# Patient Record
Sex: FEMALE | Race: BLACK OR AFRICAN AMERICAN | Employment: UNEMPLOYED | URBAN - NONMETROPOLITAN AREA
[De-identification: names, ages, dates, MRNs, and addresses within clinical notes are randomized per-mention and may not be internally consistent; named-entity substitution may affect disease eponyms.]

---

## 2017-12-18 ENCOUNTER — HOSPITAL ENCOUNTER (OUTPATIENT)
Age: 42
Discharge: HOME | End: 2017-12-18
Payer: COMMERCIAL

## 2017-12-18 ENCOUNTER — HOSPITAL ENCOUNTER (OUTPATIENT)
Dept: GENERAL RADIOLOGY | Age: 42
Discharge: HOME | End: 2017-12-18
Payer: COMMERCIAL

## 2017-12-18 DIAGNOSIS — Z01.818 PRE-OP TESTING: ICD-10-CM

## 2017-12-18 LAB
ALBUMIN SERPL-MCNC: 4.4 G/DL (ref 3.5–5.1)
ALP BLD-CCNC: 76 U/L (ref 38–126)
ALT SERPL-CCNC: 21 U/L (ref 11–66)
ANION GAP SERPL CALCULATED.3IONS-SCNC: 13 MEQ/L (ref 8–16)
AST SERPL-CCNC: 19 U/L (ref 5–40)
AVERAGE GLUCOSE: 150 MG/DL (ref 70–126)
BILIRUB SERPL-MCNC: 0.5 MG/DL (ref 0.3–1.2)
BUN BLDV-MCNC: 9 MG/DL (ref 7–22)
CALCIUM SERPL-MCNC: 9.3 MG/DL (ref 8.5–10.5)
CHLORIDE BLD-SCNC: 102 MEQ/L (ref 98–111)
CO2: 26 MEQ/L (ref 23–33)
CREAT SERPL-MCNC: 0.7 MG/DL (ref 0.4–1.2)
EKG ATRIAL RATE: 74 BPM
EKG P AXIS: 40 DEGREES
EKG P-R INTERVAL: 138 MS
EKG Q-T INTERVAL: 416 MS
EKG QRS DURATION: 82 MS
EKG QTC CALCULATION (BAZETT): 461 MS
EKG R AXIS: 53 DEGREES
EKG T AXIS: 37 DEGREES
EKG VENTRICULAR RATE: 74 BPM
GFR SERPL CREATININE-BSD FRML MDRD: > 90 ML/MIN/1.73M2
GLUCOSE BLD-MCNC: 110 MG/DL (ref 70–108)
HBA1C MFR BLD: 7 % (ref 4.4–6.4)
HCT VFR BLD CALC: 43 % (ref 37–47)
HEMOGLOBIN: 13.8 GM/DL (ref 12–16)
MCH RBC QN AUTO: 26.8 PG (ref 27–31)
MCHC RBC AUTO-ENTMCNC: 32.2 GM/DL (ref 33–37)
MCV RBC AUTO: 83.4 FL (ref 81–99)
PDW BLD-RTO: 14.3 % (ref 11.5–14.5)
PLATELET # BLD: 195 THOU/MM3 (ref 130–400)
PMV BLD AUTO: 9 MCM (ref 7.4–10.4)
POTASSIUM SERPL-SCNC: 4 MEQ/L (ref 3.5–5.2)
RBC # BLD: 5.16 MILL/MM3 (ref 4.2–5.4)
SODIUM BLD-SCNC: 141 MEQ/L (ref 135–145)
TOTAL PROTEIN: 7.4 G/DL (ref 6.1–8)
WBC # BLD: 6.1 THOU/MM3 (ref 4.8–10.8)

## 2017-12-18 PROCEDURE — 36415 COLL VENOUS BLD VENIPUNCTURE: CPT

## 2017-12-18 PROCEDURE — 83036 HEMOGLOBIN GLYCOSYLATED A1C: CPT

## 2017-12-18 PROCEDURE — 85027 COMPLETE CBC AUTOMATED: CPT

## 2017-12-18 PROCEDURE — 80053 COMPREHEN METABOLIC PANEL: CPT

## 2017-12-18 PROCEDURE — 71020 XR CHEST STANDARD TWO VW: CPT

## 2017-12-18 PROCEDURE — 93005 ELECTROCARDIOGRAM TRACING: CPT

## 2018-01-17 ENCOUNTER — ANESTHESIA (OUTPATIENT)
Dept: OPERATING ROOM | Age: 43
End: 2018-01-17
Payer: COMMERCIAL

## 2018-01-17 ENCOUNTER — HOSPITAL ENCOUNTER (OUTPATIENT)
Age: 43
Setting detail: OUTPATIENT SURGERY
Discharge: HOME | End: 2018-01-17
Attending: PODIATRIST | Admitting: PODIATRIST
Payer: COMMERCIAL

## 2018-01-17 ENCOUNTER — ANESTHESIA EVENT (OUTPATIENT)
Dept: OPERATING ROOM | Age: 43
End: 2018-01-17
Payer: COMMERCIAL

## 2018-01-17 VITALS
RESPIRATION RATE: 13 BRPM | SYSTOLIC BLOOD PRESSURE: 118 MMHG | WEIGHT: 200 LBS | DIASTOLIC BLOOD PRESSURE: 73 MMHG | HEIGHT: 66 IN | HEART RATE: 82 BPM | OXYGEN SATURATION: 94 % | TEMPERATURE: 97.6 F | BODY MASS INDEX: 32.14 KG/M2

## 2018-01-17 VITALS
RESPIRATION RATE: 3 BRPM | DIASTOLIC BLOOD PRESSURE: 45 MMHG | SYSTOLIC BLOOD PRESSURE: 86 MMHG | OXYGEN SATURATION: 95 %

## 2018-01-17 LAB — GLUCOSE BLD-MCNC: 110 MG/DL (ref 70–108)

## 2018-01-17 PROCEDURE — 2500000003 HC RX 250 WO HCPCS: Performed by: PODIATRIST

## 2018-01-17 PROCEDURE — 3600000014 HC SURGERY LEVEL 4 ADDTL 15MIN: Performed by: PODIATRIST

## 2018-01-17 PROCEDURE — 7100000000 HC PACU RECOVERY - FIRST 15 MIN: Performed by: PODIATRIST

## 2018-01-17 PROCEDURE — 3700000001 HC ADD 15 MINUTES (ANESTHESIA): Performed by: PODIATRIST

## 2018-01-17 PROCEDURE — 82948 REAGENT STRIP/BLOOD GLUCOSE: CPT

## 2018-01-17 PROCEDURE — 7100000001 HC PACU RECOVERY - ADDTL 15 MIN: Performed by: PODIATRIST

## 2018-01-17 PROCEDURE — 2580000003 HC RX 258: Performed by: STUDENT IN AN ORGANIZED HEALTH CARE EDUCATION/TRAINING PROGRAM

## 2018-01-17 PROCEDURE — 7100000011 HC PHASE II RECOVERY - ADDTL 15 MIN: Performed by: PODIATRIST

## 2018-01-17 PROCEDURE — A6222 GAUZE <=16 IN NO W/SAL W/O B: HCPCS | Performed by: PODIATRIST

## 2018-01-17 PROCEDURE — 2720000010 HC SURG SUPPLY STERILE: Performed by: PODIATRIST

## 2018-01-17 PROCEDURE — 3600000004 HC SURGERY LEVEL 4 BASE: Performed by: PODIATRIST

## 2018-01-17 PROCEDURE — 6360000002 HC RX W HCPCS

## 2018-01-17 PROCEDURE — C1713 ANCHOR/SCREW BN/BN,TIS/BN: HCPCS | Performed by: PODIATRIST

## 2018-01-17 PROCEDURE — 6360000002 HC RX W HCPCS: Performed by: NURSE ANESTHETIST, CERTIFIED REGISTERED

## 2018-01-17 PROCEDURE — 2500000003 HC RX 250 WO HCPCS: Performed by: NURSE ANESTHETIST, CERTIFIED REGISTERED

## 2018-01-17 PROCEDURE — 2580000003 HC RX 258: Performed by: NURSE ANESTHETIST, CERTIFIED REGISTERED

## 2018-01-17 PROCEDURE — 6360000002 HC RX W HCPCS: Performed by: STUDENT IN AN ORGANIZED HEALTH CARE EDUCATION/TRAINING PROGRAM

## 2018-01-17 PROCEDURE — 6370000000 HC RX 637 (ALT 250 FOR IP): Performed by: STUDENT IN AN ORGANIZED HEALTH CARE EDUCATION/TRAINING PROGRAM

## 2018-01-17 PROCEDURE — 6370000000 HC RX 637 (ALT 250 FOR IP): Performed by: NURSE ANESTHETIST, CERTIFIED REGISTERED

## 2018-01-17 PROCEDURE — 7100000010 HC PHASE II RECOVERY - FIRST 15 MIN: Performed by: PODIATRIST

## 2018-01-17 PROCEDURE — 3700000000 HC ANESTHESIA ATTENDED CARE: Performed by: PODIATRIST

## 2018-01-17 PROCEDURE — 2500000003 HC RX 250 WO HCPCS

## 2018-01-17 PROCEDURE — C1769 GUIDE WIRE: HCPCS | Performed by: PODIATRIST

## 2018-01-17 DEVICE — CANNULATED SCREW
Type: IMPLANTABLE DEVICE | Site: FOOT | Status: FUNCTIONAL
Brand: ASNIS

## 2018-01-17 RX ORDER — ASCORBIC ACID 500 MG
1000 TABLET ORAL DAILY
COMMUNITY
End: 2022-01-11

## 2018-01-17 RX ORDER — ALBUTEROL SULFATE 90 UG/1
AEROSOL, METERED RESPIRATORY (INHALATION) PRN
Status: DISCONTINUED | OUTPATIENT
Start: 2018-01-17 | End: 2018-01-17 | Stop reason: SDUPTHER

## 2018-01-17 RX ORDER — SUCCINYLCHOLINE CHLORIDE 20 MG/ML
INJECTION INTRAMUSCULAR; INTRAVENOUS PRN
Status: DISCONTINUED | OUTPATIENT
Start: 2018-01-17 | End: 2018-01-17 | Stop reason: SDUPTHER

## 2018-01-17 RX ORDER — LIDOCAINE HYDROCHLORIDE 20 MG/ML
INJECTION, SOLUTION INFILTRATION; PERINEURAL PRN
Status: DISCONTINUED | OUTPATIENT
Start: 2018-01-17 | End: 2018-01-17 | Stop reason: SDUPTHER

## 2018-01-17 RX ORDER — SODIUM CHLORIDE 0.9 % (FLUSH) 0.9 %
10 SYRINGE (ML) INJECTION EVERY 12 HOURS SCHEDULED
Status: DISCONTINUED | OUTPATIENT
Start: 2018-01-17 | End: 2018-01-17 | Stop reason: SDUPTHER

## 2018-01-17 RX ORDER — SODIUM CHLORIDE 0.9 % (FLUSH) 0.9 %
10 SYRINGE (ML) INJECTION EVERY 12 HOURS SCHEDULED
Status: DISCONTINUED | OUTPATIENT
Start: 2018-01-17 | End: 2018-01-17 | Stop reason: HOSPADM

## 2018-01-17 RX ORDER — OXYCODONE HYDROCHLORIDE AND ACETAMINOPHEN 5; 325 MG/1; MG/1
1 TABLET ORAL EVERY 4 HOURS PRN
Status: DISCONTINUED | OUTPATIENT
Start: 2018-01-17 | End: 2018-01-17 | Stop reason: HOSPADM

## 2018-01-17 RX ORDER — BUPIVACAINE HYDROCHLORIDE AND EPINEPHRINE 5; 5 MG/ML; UG/ML
INJECTION, SOLUTION EPIDURAL; INTRACAUDAL; PERINEURAL PRN
Status: DISCONTINUED | OUTPATIENT
Start: 2018-01-17 | End: 2018-01-17 | Stop reason: HOSPADM

## 2018-01-17 RX ORDER — ONDANSETRON 2 MG/ML
INJECTION INTRAMUSCULAR; INTRAVENOUS PRN
Status: DISCONTINUED | OUTPATIENT
Start: 2018-01-17 | End: 2018-01-17 | Stop reason: SDUPTHER

## 2018-01-17 RX ORDER — MAGNESIUM 30 MG
900 TABLET ORAL ONCE
COMMUNITY
End: 2020-11-04

## 2018-01-17 RX ORDER — ACETAMINOPHEN 325 MG/1
650 TABLET ORAL EVERY 4 HOURS PRN
Status: DISCONTINUED | OUTPATIENT
Start: 2018-01-17 | End: 2018-01-17 | Stop reason: HOSPADM

## 2018-01-17 RX ORDER — ONDANSETRON 2 MG/ML
4 INJECTION INTRAMUSCULAR; INTRAVENOUS EVERY 6 HOURS PRN
Status: DISCONTINUED | OUTPATIENT
Start: 2018-01-17 | End: 2018-01-17 | Stop reason: HOSPADM

## 2018-01-17 RX ORDER — OXYCODONE HYDROCHLORIDE AND ACETAMINOPHEN 5; 325 MG/1; MG/1
2 TABLET ORAL EVERY 4 HOURS PRN
Status: DISCONTINUED | OUTPATIENT
Start: 2018-01-17 | End: 2018-01-17 | Stop reason: HOSPADM

## 2018-01-17 RX ORDER — FENTANYL CITRATE 50 UG/ML
INJECTION, SOLUTION INTRAMUSCULAR; INTRAVENOUS PRN
Status: DISCONTINUED | OUTPATIENT
Start: 2018-01-17 | End: 2018-01-17 | Stop reason: SDUPTHER

## 2018-01-17 RX ORDER — SODIUM CHLORIDE 450 MG/100ML
INJECTION, SOLUTION INTRAVENOUS CONTINUOUS PRN
Status: DISCONTINUED | OUTPATIENT
Start: 2018-01-17 | End: 2018-01-17 | Stop reason: SDUPTHER

## 2018-01-17 RX ORDER — SODIUM CHLORIDE 0.9 % (FLUSH) 0.9 %
10 SYRINGE (ML) INJECTION PRN
Status: DISCONTINUED | OUTPATIENT
Start: 2018-01-17 | End: 2018-01-17 | Stop reason: SDUPTHER

## 2018-01-17 RX ORDER — DEXAMETHASONE SODIUM PHOSPHATE 4 MG/ML
INJECTION, SOLUTION INTRA-ARTICULAR; INTRALESIONAL; INTRAMUSCULAR; INTRAVENOUS; SOFT TISSUE PRN
Status: DISCONTINUED | OUTPATIENT
Start: 2018-01-17 | End: 2018-01-17 | Stop reason: SDUPTHER

## 2018-01-17 RX ORDER — MIDAZOLAM HYDROCHLORIDE 1 MG/ML
INJECTION INTRAMUSCULAR; INTRAVENOUS PRN
Status: DISCONTINUED | OUTPATIENT
Start: 2018-01-17 | End: 2018-01-17 | Stop reason: SDUPTHER

## 2018-01-17 RX ORDER — SODIUM CHLORIDE 9 MG/ML
INJECTION, SOLUTION INTRAVENOUS CONTINUOUS PRN
Status: DISCONTINUED | OUTPATIENT
Start: 2018-01-17 | End: 2018-01-17 | Stop reason: SDUPTHER

## 2018-01-17 RX ORDER — PROPOFOL 10 MG/ML
INJECTION, EMULSION INTRAVENOUS PRN
Status: DISCONTINUED | OUTPATIENT
Start: 2018-01-17 | End: 2018-01-17 | Stop reason: SDUPTHER

## 2018-01-17 RX ORDER — LANOLIN ALCOHOL/MO/W.PET/CERES
1000 CREAM (GRAM) TOPICAL DAILY
COMMUNITY
End: 2020-11-04

## 2018-01-17 RX ORDER — SODIUM CHLORIDE 0.9 % (FLUSH) 0.9 %
10 SYRINGE (ML) INJECTION PRN
Status: DISCONTINUED | OUTPATIENT
Start: 2018-01-17 | End: 2018-01-17 | Stop reason: HOSPADM

## 2018-01-17 RX ORDER — IBUPROFEN 200 MG
1 CAPSULE ORAL DAILY
COMMUNITY
End: 2020-11-04

## 2018-01-17 RX ADMIN — PHENYLEPHRINE HYDROCHLORIDE 100 MCG: 10 INJECTION INTRAVENOUS at 08:07

## 2018-01-17 RX ADMIN — SODIUM CHLORIDE: 4.5 INJECTION, SOLUTION INTRAVENOUS at 07:36

## 2018-01-17 RX ADMIN — MIDAZOLAM HYDROCHLORIDE 2 MG: 1 INJECTION, SOLUTION INTRAMUSCULAR; INTRAVENOUS at 07:37

## 2018-01-17 RX ADMIN — FENTANYL CITRATE 100 MCG: 50 INJECTION INTRAMUSCULAR; INTRAVENOUS at 07:43

## 2018-01-17 RX ADMIN — DEXAMETHASONE SODIUM PHOSPHATE 8 MG: 4 INJECTION, SOLUTION INTRAMUSCULAR; INTRAVENOUS at 08:30

## 2018-01-17 RX ADMIN — PHENYLEPHRINE HYDROCHLORIDE 100 MCG: 10 INJECTION INTRAVENOUS at 08:21

## 2018-01-17 RX ADMIN — LIDOCAINE HYDROCHLORIDE 100 MG: 20 INJECTION, SOLUTION INFILTRATION; PERINEURAL at 07:40

## 2018-01-17 RX ADMIN — ONDANSETRON 4 MG: 2 INJECTION INTRAMUSCULAR; INTRAVENOUS at 08:30

## 2018-01-17 RX ADMIN — PROPOFOL 200 MG: 10 INJECTION, EMULSION INTRAVENOUS at 07:40

## 2018-01-17 RX ADMIN — FENTANYL CITRATE 100 MCG: 50 INJECTION INTRAMUSCULAR; INTRAVENOUS at 08:03

## 2018-01-17 RX ADMIN — PHENYLEPHRINE HYDROCHLORIDE 100 MCG: 10 INJECTION INTRAVENOUS at 08:16

## 2018-01-17 RX ADMIN — SODIUM CHLORIDE: 9 INJECTION, SOLUTION INTRAVENOUS at 08:17

## 2018-01-17 RX ADMIN — SUCCINYLCHOLINE CHLORIDE 100 MG: 20 INJECTION, SOLUTION INTRAMUSCULAR; INTRAVENOUS at 07:58

## 2018-01-17 RX ADMIN — WATER 2 G: 1 INJECTION INTRAMUSCULAR; INTRAVENOUS; SUBCUTANEOUS at 07:44

## 2018-01-17 RX ADMIN — OXYCODONE HYDROCHLORIDE AND ACETAMINOPHEN 1 TABLET: 5; 325 TABLET ORAL at 10:00

## 2018-01-17 RX ADMIN — ALBUTEROL SULFATE 6 PUFF: 90 AEROSOL, METERED RESPIRATORY (INHALATION) at 08:03

## 2018-01-17 ASSESSMENT — PULMONARY FUNCTION TESTS
PIF_VALUE: 25
PIF_VALUE: 26
PIF_VALUE: 50
PIF_VALUE: 25
PIF_VALUE: 2
PIF_VALUE: 26
PIF_VALUE: 27
PIF_VALUE: 26
PIF_VALUE: 25
PIF_VALUE: 26
PIF_VALUE: 28
PIF_VALUE: 26
PIF_VALUE: 25
PIF_VALUE: 4
PIF_VALUE: 2
PIF_VALUE: 26
PIF_VALUE: 26
PIF_VALUE: 5
PIF_VALUE: 26
PIF_VALUE: 26
PIF_VALUE: 2
PIF_VALUE: 4
PIF_VALUE: 27
PIF_VALUE: 26
PIF_VALUE: 25
PIF_VALUE: 5
PIF_VALUE: 11
PIF_VALUE: 25
PIF_VALUE: 5
PIF_VALUE: 26
PIF_VALUE: 26
PIF_VALUE: 25
PIF_VALUE: 25
PIF_VALUE: 0
PIF_VALUE: 26
PIF_VALUE: 0
PIF_VALUE: 26
PIF_VALUE: 26
PIF_VALUE: 2
PIF_VALUE: 25
PIF_VALUE: 25
PIF_VALUE: 9
PIF_VALUE: 25
PIF_VALUE: 26
PIF_VALUE: 25
PIF_VALUE: 4
PIF_VALUE: 4
PIF_VALUE: 1
PIF_VALUE: 2
PIF_VALUE: 25
PIF_VALUE: 26
PIF_VALUE: 0
PIF_VALUE: 21
PIF_VALUE: 2
PIF_VALUE: 3
PIF_VALUE: 17
PIF_VALUE: 4
PIF_VALUE: 26
PIF_VALUE: 1
PIF_VALUE: 4
PIF_VALUE: 19
PIF_VALUE: 26
PIF_VALUE: 27
PIF_VALUE: 0
PIF_VALUE: 25
PIF_VALUE: 3
PIF_VALUE: 33
PIF_VALUE: 20
PIF_VALUE: 27
PIF_VALUE: 26
PIF_VALUE: 25
PIF_VALUE: 2
PIF_VALUE: 20
PIF_VALUE: 19
PIF_VALUE: 24
PIF_VALUE: 21
PIF_VALUE: 25
PIF_VALUE: 1
PIF_VALUE: 0
PIF_VALUE: 1
PIF_VALUE: 26
PIF_VALUE: 2
PIF_VALUE: 2
PIF_VALUE: 25
PIF_VALUE: 25
PIF_VALUE: 26
PIF_VALUE: 26
PIF_VALUE: 2
PIF_VALUE: 26
PIF_VALUE: 26
PIF_VALUE: 25
PIF_VALUE: 4
PIF_VALUE: 3
PIF_VALUE: 2
PIF_VALUE: 4
PIF_VALUE: 26

## 2018-01-17 ASSESSMENT — PAIN SCALES - GENERAL
PAINLEVEL_OUTOF10: 6
PAINLEVEL_OUTOF10: 0

## 2018-01-17 ASSESSMENT — PAIN DESCRIPTION - DESCRIPTORS: DESCRIPTORS: ACHING;CONSTANT

## 2018-01-17 ASSESSMENT — PAIN - FUNCTIONAL ASSESSMENT: PAIN_FUNCTIONAL_ASSESSMENT: 0-10

## 2018-01-17 NOTE — H&P
[address]  History and Physical Update    Pt Name: Salina Arshad  MRN: [id number]  YOB: 1975  Date of evaluation: 1/17/2018    [x] I have examined the patient and reviewed the H&P/Consult and there are no changes to the patient or plans.     [] I have examined the patient and reviewed the H&P/Consult and have noted the following changes:        Landry Martinez DPM  Electronically signed 1/17/2018 at 7:26 AM

## 2018-01-17 NOTE — ANESTHESIA PRE PROCEDURE
GLUCOSE 116 05/31/2012    PROT 7.4 12/18/2017    CALCIUM 9.3 12/18/2017    BILITOT 0.5 12/18/2017    ALKPHOS 76 12/18/2017    AST 19 12/18/2017    ALT 21 12/18/2017       POC Tests:   Recent Labs      01/17/18   0717   POCGLU  110*       Coags: No results found for: PROTIME, INR, APTT    HCG (If Applicable): No results found for: PREGTESTUR, PREGSERUM, HCG, HCGQUANT     ABGs: No results found for: PHART, PO2ART, OXW4PEC, OAZ5DYY, BEART, A5QLJCSM     Type & Screen (If Applicable):  No results found for: [facility]    Anesthesia Evaluation  Patient summary reviewed and Nursing notes reviewed no history of anesthetic complications:   Airway: Mallampati: II  TM distance: >3 FB   Neck ROM: full  Mouth opening: > = 3 FB Dental:          Pulmonary: breath sounds clear to auscultation  (+) asthma:                            Cardiovascular:  Exercise tolerance: good (>4 METS),   (+) hypertension:,       ECG reviewed  Rhythm: regular  Rate: normal                    Neuro/Psych:   (+) psychiatric history:            GI/Hepatic/Renal:             Endo/Other:    (+) Type II DM, well controlled, , .                 Abdominal:       Abdomen: soft. Vascular:                                        Anesthesia Plan      general     ASA 3       Induction: intravenous. MIPS: Postoperative opioids intended and Prophylactic antiemetics administered. Anesthetic plan and risks discussed with patient and spouse.       Plan discussed with CRNA, attending and surgical team.                  Catarina Ayoub DO   1/17/2018

## 2018-10-01 ENCOUNTER — APPOINTMENT (OUTPATIENT)
Dept: GENERAL RADIOLOGY | Age: 43
End: 2018-10-01
Payer: COMMERCIAL

## 2018-10-01 ENCOUNTER — HOSPITAL ENCOUNTER (EMERGENCY)
Age: 43
Discharge: HOME | End: 2018-10-01
Attending: EMERGENCY MEDICINE
Payer: COMMERCIAL

## 2018-10-01 VITALS
HEART RATE: 80 BPM | WEIGHT: 200 LBS | SYSTOLIC BLOOD PRESSURE: 126 MMHG | RESPIRATION RATE: 18 BRPM | DIASTOLIC BLOOD PRESSURE: 80 MMHG | HEIGHT: 66 IN | OXYGEN SATURATION: 96 % | TEMPERATURE: 98.5 F | BODY MASS INDEX: 32.14 KG/M2

## 2018-10-01 DIAGNOSIS — R07.89 ATYPICAL CHEST PAIN: Primary | ICD-10-CM

## 2018-10-01 LAB
ANION GAP SERPL CALCULATED.3IONS-SCNC: 14 MEQ/L (ref 8–16)
BASOPHILS # BLD: 0.3 %
BASOPHILS ABSOLUTE: 0 THOU/MM3 (ref 0–0.1)
BUN BLDV-MCNC: 11 MG/DL (ref 7–22)
CALCIUM SERPL-MCNC: 9.7 MG/DL (ref 8.5–10.5)
CHLORIDE BLD-SCNC: 100 MEQ/L (ref 98–111)
CO2: 24 MEQ/L (ref 23–33)
CREAT SERPL-MCNC: 0.6 MG/DL (ref 0.4–1.2)
EKG ATRIAL RATE: 72 BPM
EKG P AXIS: 40 DEGREES
EKG P-R INTERVAL: 154 MS
EKG Q-T INTERVAL: 400 MS
EKG QRS DURATION: 82 MS
EKG QTC CALCULATION (BAZETT): 438 MS
EKG R AXIS: 13 DEGREES
EKG T AXIS: 37 DEGREES
EKG VENTRICULAR RATE: 72 BPM
EOSINOPHIL # BLD: 2.1 %
EOSINOPHILS ABSOLUTE: 0.1 THOU/MM3 (ref 0–0.4)
ERYTHROCYTE [DISTWIDTH] IN BLOOD BY AUTOMATED COUNT: 13.5 % (ref 11.5–14.5)
ERYTHROCYTE [DISTWIDTH] IN BLOOD BY AUTOMATED COUNT: 42 FL (ref 35–45)
GFR SERPL CREATININE-BSD FRML MDRD: > 90 ML/MIN/1.73M2
GLUCOSE BLD-MCNC: 132 MG/DL (ref 70–108)
HCT VFR BLD CALC: 43.5 % (ref 37–47)
HEMOGLOBIN: 13.5 GM/DL (ref 12–16)
IMMATURE GRANS (ABS): 0.01 THOU/MM3 (ref 0–0.07)
IMMATURE GRANULOCYTES: 0.2 %
LYMPHOCYTES # BLD: 44.9 %
LYMPHOCYTES ABSOLUTE: 2.8 THOU/MM3 (ref 1–4.8)
MCH RBC QN AUTO: 26.3 PG (ref 26–33)
MCHC RBC AUTO-ENTMCNC: 31 GM/DL (ref 32.2–35.5)
MCV RBC AUTO: 84.8 FL (ref 81–99)
MONOCYTES # BLD: 6.8 %
MONOCYTES ABSOLUTE: 0.4 THOU/MM3 (ref 0.4–1.3)
NUCLEATED RED BLOOD CELLS: 0 /100 WBC
OSMOLALITY CALCULATION: 276.9 MOSMOL/KG (ref 275–300)
PLATELET # BLD: 230 THOU/MM3 (ref 130–400)
PMV BLD AUTO: 10.9 FL (ref 9.4–12.4)
POTASSIUM SERPL-SCNC: 3.7 MEQ/L (ref 3.5–5.2)
RBC # BLD: 5.13 MILL/MM3 (ref 4.2–5.4)
SEG NEUTROPHILS: 45.7 %
SEGMENTED NEUTROPHILS ABSOLUTE COUNT: 2.8 THOU/MM3 (ref 1.8–7.7)
SODIUM BLD-SCNC: 138 MEQ/L (ref 135–145)
TROPONIN T: < 0.01 NG/ML
TROPONIN T: < 0.01 NG/ML
WBC # BLD: 6.2 THOU/MM3 (ref 4.8–10.8)

## 2018-10-01 PROCEDURE — 80048 BASIC METABOLIC PNL TOTAL CA: CPT

## 2018-10-01 PROCEDURE — 85025 COMPLETE CBC W/AUTO DIFF WBC: CPT

## 2018-10-01 PROCEDURE — 84484 ASSAY OF TROPONIN QUANT: CPT

## 2018-10-01 PROCEDURE — 36415 COLL VENOUS BLD VENIPUNCTURE: CPT

## 2018-10-01 PROCEDURE — 99285 EMERGENCY DEPT VISIT HI MDM: CPT

## 2018-10-01 PROCEDURE — 71046 X-RAY EXAM CHEST 2 VIEWS: CPT

## 2018-10-01 PROCEDURE — 93005 ELECTROCARDIOGRAM TRACING: CPT | Performed by: EMERGENCY MEDICINE

## 2018-10-01 RX ORDER — NAPROXEN 500 MG/1
500 TABLET ORAL 2 TIMES DAILY PRN
Qty: 30 TABLET | Refills: 0 | Status: SHIPPED | OUTPATIENT
Start: 2018-10-01 | End: 2022-01-11

## 2018-10-01 ASSESSMENT — PAIN SCALES - GENERAL
PAINLEVEL_OUTOF10: 6
PAINLEVEL_OUTOF10: 6

## 2018-10-01 ASSESSMENT — ENCOUNTER SYMPTOMS
SHORTNESS OF BREATH: 0
SORE THROAT: 0
BACK PAIN: 0
COUGH: 0
ABDOMINAL PAIN: 0
VOMITING: 0
RHINORRHEA: 0
DIARRHEA: 0
NAUSEA: 0
CONSTIPATION: 0
WHEEZING: 0
BLOOD IN STOOL: 0

## 2018-10-01 ASSESSMENT — PAIN DESCRIPTION - FREQUENCY
FREQUENCY: INTERMITTENT
FREQUENCY: CONTINUOUS

## 2018-10-01 ASSESSMENT — HEART SCORE: ECG: 0

## 2018-10-01 ASSESSMENT — PAIN DESCRIPTION - PAIN TYPE
TYPE: ACUTE PAIN
TYPE: ACUTE PAIN

## 2018-10-01 ASSESSMENT — PAIN DESCRIPTION - LOCATION: LOCATION: CHEST

## 2018-10-01 ASSESSMENT — PAIN DESCRIPTION - ORIENTATION
ORIENTATION: RIGHT;LEFT
ORIENTATION: LEFT;RIGHT

## 2018-10-01 NOTE — ED NOTES
Presents to ED for chest pain since Friday. Rates pain 6/10. Shows no signs of distress at this time. EKG completed. Vital signs as noted.       Rock Waters RN  10/01/18 9605

## 2018-10-01 NOTE — ED PROVIDER NOTES
PM    Provider:  I personally performed the services described in the documentation, reviewed and edited the documentation which was dictated to the scribe in my presence, and it accurately records my words and actions.      10/07/18 3:34 PM      Amisha Huston MD      Emergency room physician              Amisha Huston MD  10/07/18 9931

## 2018-10-01 NOTE — ED NOTES
Patient in bed denies pain. Patient wants to eat something and to have something to drink. Updated on plan of care. Call light in reach. Will continue to monitor.       Latosha Dickson RN  10/01/18 0475

## 2018-10-02 PROCEDURE — 93010 ELECTROCARDIOGRAM REPORT: CPT | Performed by: INTERNAL MEDICINE

## 2018-10-05 ENCOUNTER — OFFICE VISIT (OUTPATIENT)
Dept: CARDIOLOGY CLINIC | Age: 43
End: 2018-10-05
Payer: COMMERCIAL

## 2018-10-05 VITALS
WEIGHT: 199.6 LBS | HEIGHT: 66 IN | SYSTOLIC BLOOD PRESSURE: 124 MMHG | DIASTOLIC BLOOD PRESSURE: 89 MMHG | HEART RATE: 72 BPM | BODY MASS INDEX: 32.08 KG/M2

## 2018-10-05 DIAGNOSIS — E78.00 PURE HYPERCHOLESTEROLEMIA: ICD-10-CM

## 2018-10-05 DIAGNOSIS — R07.89 CHEST PAIN, ATYPICAL: Primary | ICD-10-CM

## 2018-10-05 DIAGNOSIS — K21.9 GASTROESOPHAGEAL REFLUX DISEASE, ESOPHAGITIS PRESENCE NOT SPECIFIED: ICD-10-CM

## 2018-10-05 DIAGNOSIS — I10 ESSENTIAL HYPERTENSION: ICD-10-CM

## 2018-10-05 PROCEDURE — 99204 OFFICE O/P NEW MOD 45 MIN: CPT | Performed by: INTERNAL MEDICINE

## 2018-10-15 ENCOUNTER — HOSPITAL ENCOUNTER (OUTPATIENT)
Dept: NON INVASIVE DIAGNOSTICS | Age: 43
Discharge: HOME | End: 2018-10-15
Payer: COMMERCIAL

## 2018-10-15 VITALS — BODY MASS INDEX: 32.14 KG/M2 | HEIGHT: 66 IN | WEIGHT: 200 LBS

## 2018-10-15 DIAGNOSIS — E78.00 PURE HYPERCHOLESTEROLEMIA: ICD-10-CM

## 2018-10-15 DIAGNOSIS — K21.9 GASTROESOPHAGEAL REFLUX DISEASE, ESOPHAGITIS PRESENCE NOT SPECIFIED: ICD-10-CM

## 2018-10-15 DIAGNOSIS — R07.89 CHEST PAIN, ATYPICAL: ICD-10-CM

## 2018-10-15 DIAGNOSIS — I10 ESSENTIAL HYPERTENSION: ICD-10-CM

## 2018-10-15 LAB
LV EF: 55 %
LVEF MODALITY: NORMAL

## 2018-10-15 PROCEDURE — 78452 HT MUSCLE IMAGE SPECT MULT: CPT

## 2018-10-15 PROCEDURE — 6360000002 HC RX W HCPCS

## 2018-10-15 PROCEDURE — 93017 CV STRESS TEST TRACING ONLY: CPT

## 2018-10-15 PROCEDURE — 3430000000 HC RX DIAGNOSTIC RADIOPHARMACEUTICAL: Performed by: INTERNAL MEDICINE

## 2018-10-15 PROCEDURE — 93306 TTE W/DOPPLER COMPLETE: CPT

## 2018-10-15 PROCEDURE — A9500 TC99M SESTAMIBI: HCPCS | Performed by: INTERNAL MEDICINE

## 2018-10-15 PROCEDURE — 2709999900 HC NON-CHARGEABLE SUPPLY

## 2018-10-15 RX ADMIN — Medication 33.2 MILLICURIE: at 14:37

## 2018-10-15 RX ADMIN — Medication 9.7 MILLICURIE: at 13:48

## 2018-10-19 ENCOUNTER — OFFICE VISIT (OUTPATIENT)
Dept: CARDIOLOGY CLINIC | Age: 43
End: 2018-10-19
Payer: COMMERCIAL

## 2018-10-19 VITALS
WEIGHT: 199.4 LBS | BODY MASS INDEX: 32.05 KG/M2 | HEIGHT: 66 IN | HEART RATE: 72 BPM | DIASTOLIC BLOOD PRESSURE: 90 MMHG | SYSTOLIC BLOOD PRESSURE: 144 MMHG

## 2018-10-19 DIAGNOSIS — Z87.898 HISTORY OF CHEST PAIN: ICD-10-CM

## 2018-10-19 DIAGNOSIS — K21.9 GASTROESOPHAGEAL REFLUX DISEASE, ESOPHAGITIS PRESENCE NOT SPECIFIED: ICD-10-CM

## 2018-10-19 DIAGNOSIS — I10 ESSENTIAL HYPERTENSION: Primary | ICD-10-CM

## 2018-10-19 DIAGNOSIS — E78.00 PURE HYPERCHOLESTEROLEMIA: ICD-10-CM

## 2018-10-19 PROCEDURE — 99213 OFFICE O/P EST LOW 20 MIN: CPT | Performed by: INTERNAL MEDICINE

## 2018-10-19 RX ORDER — PANTOPRAZOLE SODIUM 40 MG/1
40 TABLET, DELAYED RELEASE ORAL DAILY
Qty: 90 TABLET | Refills: 2 | Status: SHIPPED | OUTPATIENT
Start: 2018-10-19 | End: 2022-01-11

## 2018-10-19 NOTE — PROGRESS NOTES
GASTROINTESTINAL ENDOSCOPY      2013       Allergies   Allergen Reactions    Peach [Prunus Persica] Itching and Swelling    Cinnamon Other (See Comments)     Migraine. Lip Swelling. Throat Itching.      Codeine Hives and Itching    Lamictal [Lamotrigine] Itching    Phenergan [Promethazine Hcl] Other (See Comments)     Gets very aggressive     Tramadol Itching and Other (See Comments)     Gets aggressive          Family History   Problem Relation Age of Onset    Arthritis Mother     Diabetes Mother     Allergies Mother     Hypertension Mother     Asthma Father     Allergies Father     Diabetes Maternal Grandmother     Hypertension Maternal Grandmother     Diabetes Paternal Grandmother         Social History     Social History    Marital status:      Spouse name: N/A    Number of children: N/A    Years of education: N/A     Occupational History    unemployed      Social History Main Topics    Smoking status: Never Smoker    Smokeless tobacco: Never Used    Alcohol use No    Drug use: No    Sexual activity: Not on file     Other Topics Concern    Not on file     Social History Narrative    No narrative on file       Current Outpatient Prescriptions   Medication Sig Dispense Refill    pantoprazole (PROTONIX) 40 MG tablet Take 1 tablet by mouth daily 90 tablet 2    naproxen (NAPROXEN) 500 MG EC tablet Take 1 tablet by mouth 2 times daily as needed for Pain Take medications with food 30 tablet 0    calcium citrate (CALCITRATE) 950 MG tablet Take 1 tablet by mouth daily      vitamin C (ASCORBIC ACID) 500 MG tablet Take 1,000 mg by mouth daily      vitamin B-12 (CYANOCOBALAMIN) 1000 MCG tablet Take 1,000 mcg by mouth daily      magnesium 30 MG tablet Take 900 mg by mouth once      acetaminophen (TYLENOL) 325 MG tablet Take 650 mg by mouth every 6 hours as needed for Pain      amLODIPine (NORVASC) 10 MG tablet Take 10 mg by mouth daily      metFORMIN (GLUCOPHAGE) 500 MG tablet Take

## 2018-12-11 ENCOUNTER — OFFICE VISIT (OUTPATIENT)
Dept: ENT CLINIC | Age: 43
End: 2018-12-11
Payer: COMMERCIAL

## 2018-12-11 VITALS
DIASTOLIC BLOOD PRESSURE: 80 MMHG | TEMPERATURE: 98.4 F | WEIGHT: 202.8 LBS | HEART RATE: 76 BPM | BODY MASS INDEX: 32.59 KG/M2 | SYSTOLIC BLOOD PRESSURE: 114 MMHG | HEIGHT: 66 IN | RESPIRATION RATE: 16 BRPM

## 2018-12-11 DIAGNOSIS — M26.629 TMJ SYNDROME: ICD-10-CM

## 2018-12-11 DIAGNOSIS — J32.0 CHRONIC MAXILLARY SINUSITIS: ICD-10-CM

## 2018-12-11 DIAGNOSIS — F45.8 BRUXISM: ICD-10-CM

## 2018-12-11 DIAGNOSIS — J30.89 ENVIRONMENTAL AND SEASONAL ALLERGIES: ICD-10-CM

## 2018-12-11 DIAGNOSIS — J32.2 CHRONIC ETHMOIDAL SINUSITIS: Primary | ICD-10-CM

## 2018-12-11 PROCEDURE — 99243 OFF/OP CNSLTJ NEW/EST LOW 30: CPT | Performed by: OTOLARYNGOLOGY

## 2018-12-11 RX ORDER — MONTELUKAST SODIUM 10 MG/1
10 TABLET ORAL NIGHTLY
Qty: 30 TABLET | Refills: 3 | Status: SHIPPED | OUTPATIENT
Start: 2018-12-11 | End: 2022-01-11

## 2018-12-11 RX ORDER — OLOPATADINE HYDROCHLORIDE 665 UG/1
2 SPRAY NASAL 2 TIMES DAILY
Qty: 1 BOTTLE | Refills: 5 | Status: SHIPPED | OUTPATIENT
Start: 2018-12-11 | End: 2022-01-11

## 2018-12-11 RX ORDER — OLOPATADINE HYDROCHLORIDE 2 MG/ML
1 SOLUTION/ DROPS OPHTHALMIC DAILY
Qty: 1 BOTTLE | Refills: 5 | Status: SHIPPED | OUTPATIENT
Start: 2018-12-11 | End: 2022-01-11

## 2018-12-11 RX ORDER — AMOXICILLIN AND CLAVULANATE POTASSIUM 875; 125 MG/1; MG/1
1 TABLET, FILM COATED ORAL 2 TIMES DAILY
Qty: 56 TABLET | Refills: 0 | Status: SHIPPED | OUTPATIENT
Start: 2018-12-11 | End: 2019-01-08

## 2018-12-11 RX ORDER — LEVOCETIRIZINE DIHYDROCHLORIDE 5 MG/1
5 TABLET, FILM COATED ORAL NIGHTLY
Qty: 30 TABLET | Refills: 5 | Status: SHIPPED | OUTPATIENT
Start: 2018-12-11 | End: 2022-01-11

## 2018-12-11 NOTE — PROGRESS NOTES
1    Potassium Gluconate 595 MG CAPS Take 1 capsule by mouth as needed. Taken with hydrochlorothiazide       No current facility-administered medications for this visit.       Past Medical History:   Diagnosis Date    Allergic rhinitis     Allergy     Anxiety     Arthritis     Asthma     [facility]     Bronchitis 2006    Cancer ([facility] 1992    cervical    Depression     Diabetes mellitus ([facility]     Hyperlipidemia     Hypertension     Ligament tear     left foot    Osteoarthritis       Past Surgical History:   Procedure Laterality Date    APPENDECTOMY  1983    BONY PELVIS SURGERY  2011    Screws removed-OIO    BREAST BIOPSY  08/06/2012    Bilateral Breast biopsies x2    BREAST LUMPECTOMY  10/25/2010    Dr. Naya Moore of left breast mass    CARPAL TUNNEL RELEASE Left 2012     OIO    ELBOW SURGERY  2012    lt-OIO    ENDOSCOPIC DEBRIDEMENT OF SINONASAL CAVITY  3/30/11    FEMUR SURGERY  july 14th 2011    rt femur rods removed-OIO    FOOT SURGERY Left 02/22/2017    tarsal tunnel decompression, neural lysis posterior tibial nerve, wrapping of nerve with amniotic membrane, ligation of varicosities, neurectomy medial calcaneal nerve branch    FOOT SURGERY Left 01/17/2018    Medial Displacement Calcaneal Osteotomy Left Foot, Plantar Fasciotomy with Topaz Left Foot     HYSTERECTOMY  2007    Dr. Deidre Montes    rt knee-arthoscopy     OSTEOTOMY Left 1/17/2018    MEDIAL DISPLACEMENT CALCANEAL OSTEOTOMY, PLANTAR FASCIOTOMY WITH TOPAZ, GASTROCNEMIS LENGTHING  ALL LEFT FOOT performed by Nghia Wyman DPM at [facility]  july 2011    had rods and screws removed from leg and hip-OIO    SINUS SURGERY  3/16/11    septoplasty    UPPER GASTROINTESTINAL ENDOSCOPY      2013     Family History   Problem Relation Age of Onset    Arthritis Mother     Diabetes Mother     Allergies Mother     Hypertension Mother     Asthma Father    [facility]

## 2018-12-17 ENCOUNTER — TELEPHONE (OUTPATIENT)
Dept: ENT CLINIC | Age: 43
End: 2018-12-17

## 2019-01-07 ENCOUNTER — TELEPHONE (OUTPATIENT)
Dept: ENT CLINIC | Age: 44
End: 2019-01-07

## 2019-01-14 ENCOUNTER — HOSPITAL ENCOUNTER (OUTPATIENT)
Dept: CT IMAGING | Age: 44
Discharge: HOME | End: 2019-01-14
Payer: COMMERCIAL

## 2019-01-14 DIAGNOSIS — J32.2 CHRONIC ETHMOIDAL SINUSITIS: ICD-10-CM

## 2019-01-14 DIAGNOSIS — J32.0 CHRONIC MAXILLARY SINUSITIS: ICD-10-CM

## 2019-01-14 PROCEDURE — 70486 CT MAXILLOFACIAL W/O DYE: CPT

## 2019-01-18 ENCOUNTER — OFFICE VISIT (OUTPATIENT)
Dept: ENT CLINIC | Age: 44
End: 2019-01-18
Payer: COMMERCIAL

## 2019-01-18 VITALS
HEIGHT: 66 IN | BODY MASS INDEX: 32.67 KG/M2 | HEART RATE: 80 BPM | WEIGHT: 203.3 LBS | RESPIRATION RATE: 16 BRPM | TEMPERATURE: 98.2 F | SYSTOLIC BLOOD PRESSURE: 138 MMHG | DIASTOLIC BLOOD PRESSURE: 86 MMHG

## 2019-01-18 DIAGNOSIS — M26.629 TMJ SYNDROME: ICD-10-CM

## 2019-01-18 DIAGNOSIS — J32.3 CHRONIC SPHENOIDAL SINUSITIS: ICD-10-CM

## 2019-01-18 DIAGNOSIS — J30.89 ENVIRONMENTAL AND SEASONAL ALLERGIES: ICD-10-CM

## 2019-01-18 DIAGNOSIS — F45.8 BRUXISM: ICD-10-CM

## 2019-01-18 DIAGNOSIS — J32.2 CHRONIC ETHMOIDAL SINUSITIS: Primary | ICD-10-CM

## 2019-01-18 PROCEDURE — 99213 OFFICE O/P EST LOW 20 MIN: CPT | Performed by: OTOLARYNGOLOGY

## 2019-01-18 ASSESSMENT — ENCOUNTER SYMPTOMS
TROUBLE SWALLOWING: 0
DIARRHEA: 0
CHEST TIGHTNESS: 0
SINUS PRESSURE: 0
VOMITING: 0
FACIAL SWELLING: 0
SHORTNESS OF BREATH: 0
NAUSEA: 0
APNEA: 0
ABDOMINAL PAIN: 0
CHOKING: 0
VOICE CHANGE: 0
COLOR CHANGE: 0
RHINORRHEA: 0
EYE ITCHING: 1
WHEEZING: 0
SORE THROAT: 0
COUGH: 0
STRIDOR: 0

## 2019-08-26 ENCOUNTER — APPOINTMENT (OUTPATIENT)
Dept: GENERAL RADIOLOGY | Age: 44
End: 2019-08-26
Payer: COMMERCIAL

## 2019-08-26 ENCOUNTER — HOSPITAL ENCOUNTER (EMERGENCY)
Age: 44
Discharge: HOME | End: 2019-08-26
Payer: COMMERCIAL

## 2019-08-26 VITALS
DIASTOLIC BLOOD PRESSURE: 93 MMHG | WEIGHT: 194 LBS | HEART RATE: 83 BPM | TEMPERATURE: 98.5 F | HEIGHT: 66 IN | OXYGEN SATURATION: 96 % | RESPIRATION RATE: 16 BRPM | BODY MASS INDEX: 31.18 KG/M2 | SYSTOLIC BLOOD PRESSURE: 142 MMHG

## 2019-08-26 DIAGNOSIS — M17.31 POST-TRAUMATIC OSTEOARTHRITIS OF RIGHT KNEE: Primary | ICD-10-CM

## 2019-08-26 DIAGNOSIS — M79.672 CHRONIC PAIN IN LEFT FOOT: ICD-10-CM

## 2019-08-26 DIAGNOSIS — G89.29 CHRONIC PAIN IN LEFT FOOT: ICD-10-CM

## 2019-08-26 LAB
BASOPHILS # BLD: 0.1 %
BASOPHILS ABSOLUTE: 0 THOU/MM3 (ref 0–0.1)
C-REACTIVE PROTEIN: 2 MG/DL (ref 0–1)
EOSINOPHIL # BLD: 1.8 %
EOSINOPHILS ABSOLUTE: 0.1 THOU/MM3 (ref 0–0.4)
ERYTHROCYTE [DISTWIDTH] IN BLOOD BY AUTOMATED COUNT: 13.1 % (ref 11.5–14.5)
ERYTHROCYTE [DISTWIDTH] IN BLOOD BY AUTOMATED COUNT: 39.9 FL (ref 35–45)
HCT VFR BLD CALC: 44 % (ref 37–47)
HEMOGLOBIN: 13.7 GM/DL (ref 12–16)
IMMATURE GRANS (ABS): 0.01 THOU/MM3 (ref 0–0.07)
IMMATURE GRANULOCYTES: 0 %
LYMPHOCYTES # BLD: 31.2 %
LYMPHOCYTES ABSOLUTE: 2.1 THOU/MM3 (ref 1–4.8)
MCH RBC QN AUTO: 26 PG (ref 26–33)
MCHC RBC AUTO-ENTMCNC: 31.1 GM/DL (ref 32.2–35.5)
MCV RBC AUTO: 83.5 FL (ref 81–99)
MONOCYTES # BLD: 6.7 %
MONOCYTES ABSOLUTE: 0.5 THOU/MM3 (ref 0.4–1.3)
NUCLEATED RED BLOOD CELLS: 0 /100 WBC
PLATELET # BLD: 219 THOU/MM3 (ref 130–400)
PMV BLD AUTO: 10.6 FL (ref 9.4–12.4)
RBC # BLD: 5.27 MILL/MM3 (ref 4.2–5.4)
SEG NEUTROPHILS: 60.1 %
SEGMENTED NEUTROPHILS ABSOLUTE COUNT: 4.1 THOU/MM3 (ref 1.8–7.7)
WBC # BLD: 6.8 THOU/MM3 (ref 4.8–10.8)

## 2019-08-26 PROCEDURE — 6370000000 HC RX 637 (ALT 250 FOR IP): Performed by: STUDENT IN AN ORGANIZED HEALTH CARE EDUCATION/TRAINING PROGRAM

## 2019-08-26 PROCEDURE — 99283 EMERGENCY DEPT VISIT LOW MDM: CPT

## 2019-08-26 PROCEDURE — 36415 COLL VENOUS BLD VENIPUNCTURE: CPT

## 2019-08-26 PROCEDURE — 2709999900 HC NON-CHARGEABLE SUPPLY

## 2019-08-26 PROCEDURE — 86140 C-REACTIVE PROTEIN: CPT

## 2019-08-26 PROCEDURE — 73564 X-RAY EXAM KNEE 4 OR MORE: CPT

## 2019-08-26 PROCEDURE — 73630 X-RAY EXAM OF FOOT: CPT

## 2019-08-26 PROCEDURE — 85025 COMPLETE CBC W/AUTO DIFF WBC: CPT

## 2019-08-26 RX ORDER — HYDROCODONE BITARTRATE AND ACETAMINOPHEN 5; 325 MG/1; MG/1
1 TABLET ORAL EVERY 6 HOURS PRN
Qty: 12 TABLET | Refills: 0 | Status: SHIPPED | OUTPATIENT
Start: 2019-08-26 | End: 2019-08-29

## 2019-08-26 RX ORDER — HYDROCODONE BITARTRATE AND ACETAMINOPHEN 5; 325 MG/1; MG/1
1 TABLET ORAL ONCE
Status: COMPLETED | OUTPATIENT
Start: 2019-08-26 | End: 2019-08-26

## 2019-08-26 RX ADMIN — HYDROCODONE BITARTRATE AND ACETAMINOPHEN 1 TABLET: 5; 325 TABLET ORAL at 09:39

## 2019-08-26 ASSESSMENT — ENCOUNTER SYMPTOMS
EYE PAIN: 0
COUGH: 0
NAUSEA: 0
EYE DISCHARGE: 0
VOMITING: 0
RHINORRHEA: 0
BACK PAIN: 0
WHEEZING: 0
DIARRHEA: 0
ABDOMINAL PAIN: 0
SORE THROAT: 0
SHORTNESS OF BREATH: 0

## 2019-08-26 ASSESSMENT — PAIN SCALES - GENERAL
PAINLEVEL_OUTOF10: 8
PAINLEVEL_OUTOF10: 8
PAINLEVEL_OUTOF10: 3

## 2019-08-26 ASSESSMENT — PAIN DESCRIPTION - ORIENTATION
ORIENTATION_2: LEFT
ORIENTATION: RIGHT

## 2019-08-26 ASSESSMENT — PAIN DESCRIPTION - LOCATION
LOCATION: KNEE
LOCATION_2: FOOT

## 2019-08-26 ASSESSMENT — PAIN DESCRIPTION - INTENSITY: RATING_2: 4

## 2019-08-26 ASSESSMENT — PAIN DESCRIPTION - PAIN TYPE: TYPE: ACUTE PAIN

## 2019-08-26 NOTE — ED NOTES
Patient presents to the ED with complaints of right knee pain and left foot pain. No known injuries.       Lalo Vance LPN  33/10/47 7932

## 2019-08-26 NOTE — LETTER
[facility] [address] [address] EMERGENCY DEPT  [address] [place] [address]  Phone: [phone]               August 26, 2019    Patient: Sophy London   YOB: 1975   Date of Visit: 8/26/2019       To Whom It May Concern:    Kylie Lee was seen and treated in our emergency department on 8/26/2019. She may return to work on 8/29/19.       Sincerely,       Ariadna Goel PA-C         Signature:__________________________________
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
## 2019-10-09 ENCOUNTER — HOSPITAL ENCOUNTER (OUTPATIENT)
Age: 44
Discharge: HOME | End: 2019-10-09
Payer: COMMERCIAL

## 2019-10-09 ENCOUNTER — HOSPITAL ENCOUNTER (OUTPATIENT)
Dept: NEUROLOGY | Age: 44
Discharge: HOME | End: 2019-10-09
Payer: COMMERCIAL

## 2019-10-09 LAB
ANION GAP SERPL CALCULATED.3IONS-SCNC: 11 MEQ/L (ref 8–16)
BASOPHILS # BLD: 0.2 %
BASOPHILS ABSOLUTE: 0 THOU/MM3 (ref 0–0.1)
BUN BLDV-MCNC: 10 MG/DL (ref 7–22)
CALCIUM SERPL-MCNC: 10.1 MG/DL (ref 8.5–10.5)
CHLORIDE BLD-SCNC: 103 MEQ/L (ref 98–111)
CHOLESTEROL, TOTAL: 199 MG/DL (ref 100–199)
CO2: 28 MEQ/L (ref 23–33)
CREAT SERPL-MCNC: 0.7 MG/DL (ref 0.4–1.2)
EOSINOPHIL # BLD: 3.1 %
EOSINOPHILS ABSOLUTE: 0.2 THOU/MM3 (ref 0–0.4)
ERYTHROCYTE [DISTWIDTH] IN BLOOD BY AUTOMATED COUNT: 13.8 % (ref 11.5–14.5)
ERYTHROCYTE [DISTWIDTH] IN BLOOD BY AUTOMATED COUNT: 42.7 FL (ref 35–45)
GFR SERPL CREATININE-BSD FRML MDRD: > 90 ML/MIN/1.73M2
GLUCOSE BLD-MCNC: 122 MG/DL (ref 70–108)
HCT VFR BLD CALC: 46.6 % (ref 37–47)
HDLC SERPL-MCNC: 51 MG/DL
HEMOGLOBIN: 14.2 GM/DL (ref 12–16)
IMMATURE GRANS (ABS): 0.03 THOU/MM3 (ref 0–0.07)
IMMATURE GRANULOCYTES: 0.6 %
LDL CHOLESTEROL CALCULATED: 117 MG/DL
LYMPHOCYTES # BLD: 41.9 %
LYMPHOCYTES ABSOLUTE: 2.2 THOU/MM3 (ref 1–4.8)
MAGNESIUM: 2 MG/DL (ref 1.6–2.4)
MCH RBC QN AUTO: 26.2 PG (ref 26–33)
MCHC RBC AUTO-ENTMCNC: 30.5 GM/DL (ref 32.2–35.5)
MCV RBC AUTO: 85.8 FL (ref 81–99)
MONOCYTES # BLD: 7.2 %
MONOCYTES ABSOLUTE: 0.4 THOU/MM3 (ref 0.4–1.3)
NUCLEATED RED BLOOD CELLS: 0 /100 WBC
PLATELET # BLD: 243 THOU/MM3 (ref 130–400)
PMV BLD AUTO: 10 FL (ref 9.4–12.4)
POTASSIUM SERPL-SCNC: 4.5 MEQ/L (ref 3.5–5.2)
RBC # BLD: 5.43 MILL/MM3 (ref 4.2–5.4)
SEG NEUTROPHILS: 47 %
SEGMENTED NEUTROPHILS ABSOLUTE COUNT: 2.4 THOU/MM3 (ref 1.8–7.7)
SODIUM BLD-SCNC: 142 MEQ/L (ref 135–145)
TRIGL SERPL-MCNC: 157 MG/DL (ref 0–199)
TSH SERPL DL<=0.05 MIU/L-ACNC: 1.32 UIU/ML (ref 0.4–4.2)
WBC # BLD: 5.2 THOU/MM3 (ref 4.8–10.8)

## 2019-10-09 PROCEDURE — 83735 ASSAY OF MAGNESIUM: CPT

## 2019-10-09 PROCEDURE — 80061 LIPID PANEL: CPT

## 2019-10-09 PROCEDURE — 84443 ASSAY THYROID STIM HORMONE: CPT

## 2019-10-09 PROCEDURE — 36415 COLL VENOUS BLD VENIPUNCTURE: CPT

## 2019-10-09 PROCEDURE — 85025 COMPLETE CBC W/AUTO DIFF WBC: CPT

## 2019-10-09 PROCEDURE — 95819 EEG AWAKE AND ASLEEP: CPT

## 2019-10-09 PROCEDURE — 80048 BASIC METABOLIC PNL TOTAL CA: CPT

## 2020-01-24 ENCOUNTER — HOSPITAL ENCOUNTER (OUTPATIENT)
Age: 45
Discharge: HOME | End: 2020-01-24
Payer: COMMERCIAL

## 2020-01-24 ENCOUNTER — HOSPITAL ENCOUNTER (OUTPATIENT)
Age: 45
Setting detail: SPECIMEN
Discharge: HOME | End: 2020-01-24
Payer: COMMERCIAL

## 2020-01-24 ENCOUNTER — HOSPITAL ENCOUNTER (OUTPATIENT)
Dept: GENERAL RADIOLOGY | Age: 45
Discharge: HOME | End: 2020-01-24
Payer: COMMERCIAL

## 2020-01-24 LAB
ALBUMIN SERPL-MCNC: 4.9 G/DL (ref 3.5–5.2)
ALBUMIN/GLOBULIN RATIO: 1.5 (ref 1–2.5)
ALP BLD-CCNC: 56 U/L (ref 35–104)
ALT SERPL-CCNC: 21 U/L (ref 5–33)
ANION GAP SERPL CALCULATED.3IONS-SCNC: 20 MMOL/L (ref 9–17)
AST SERPL-CCNC: 22 U/L
BILIRUB SERPL-MCNC: 0.42 MG/DL (ref 0.3–1.2)
BUN BLDV-MCNC: 12 MG/DL (ref 6–20)
BUN/CREAT BLD: ABNORMAL (ref 9–20)
CALCIUM SERPL-MCNC: 10 MG/DL (ref 8.6–10.4)
CHLORIDE BLD-SCNC: 103 MMOL/L (ref 98–107)
CO2: 22 MMOL/L (ref 20–31)
CREAT SERPL-MCNC: 0.71 MG/DL (ref 0.5–0.9)
EKG ATRIAL RATE: 83 BPM
EKG P AXIS: 26 DEGREES
EKG P-R INTERVAL: 132 MS
EKG Q-T INTERVAL: 372 MS
EKG QRS DURATION: 86 MS
EKG QTC CALCULATION (BAZETT): 437 MS
EKG R AXIS: 27 DEGREES
EKG T AXIS: 71 DEGREES
EKG VENTRICULAR RATE: 83 BPM
GFR AFRICAN AMERICAN: >60 ML/MIN
GFR NON-AFRICAN AMERICAN: >60 ML/MIN
GFR SERPL CREATININE-BSD FRML MDRD: ABNORMAL ML/MIN/{1.73_M2}
GFR SERPL CREATININE-BSD FRML MDRD: ABNORMAL ML/MIN/{1.73_M2}
GLUCOSE BLD-MCNC: 76 MG/DL (ref 70–99)
HCT VFR BLD CALC: 45.3 % (ref 36.3–47.1)
HEMOGLOBIN: 13.4 G/DL (ref 11.9–15.1)
INR BLD: 0.9
MCH RBC QN AUTO: 26.1 PG (ref 25.2–33.5)
MCHC RBC AUTO-ENTMCNC: 29.6 G/DL (ref 28.4–34.8)
MCV RBC AUTO: 88.1 FL (ref 82.6–102.9)
NRBC AUTOMATED: 0 PER 100 WBC
PDW BLD-RTO: 13.3 % (ref 11.8–14.4)
PLATELET # BLD: 146 K/UL (ref 138–453)
PMV BLD AUTO: 12 FL (ref 8.1–13.5)
POTASSIUM SERPL-SCNC: 3.5 MMOL/L (ref 3.7–5.3)
PROTHROMBIN TIME: 10.1 SEC (ref 9–12)
RBC # BLD: 5.14 M/UL (ref 3.95–5.11)
SODIUM BLD-SCNC: 145 MMOL/L (ref 135–144)
TOTAL PROTEIN: 8.1 G/DL (ref 6.4–8.3)
WBC # BLD: 5.9 K/UL (ref 3.5–11.3)

## 2020-01-24 PROCEDURE — 71046 X-RAY EXAM CHEST 2 VIEWS: CPT

## 2020-01-24 PROCEDURE — 93005 ELECTROCARDIOGRAM TRACING: CPT | Performed by: PODIATRIST

## 2020-01-26 LAB
ESTIMATED AVERAGE GLUCOSE: 151 MG/DL
HBA1C MFR BLD: 6.9 % (ref 4–6)

## 2020-02-05 ENCOUNTER — ANESTHESIA EVENT (OUTPATIENT)
Dept: OPERATING ROOM | Age: 45
End: 2020-02-05
Payer: COMMERCIAL

## 2020-02-05 ENCOUNTER — HOSPITAL ENCOUNTER (OUTPATIENT)
Age: 45
Setting detail: OUTPATIENT SURGERY
Discharge: HOME | End: 2020-02-05
Attending: PODIATRIST | Admitting: PODIATRIST
Payer: COMMERCIAL

## 2020-02-05 ENCOUNTER — ANESTHESIA (OUTPATIENT)
Dept: OPERATING ROOM | Age: 45
End: 2020-02-05
Payer: COMMERCIAL

## 2020-02-05 VITALS
TEMPERATURE: 98.3 F | OXYGEN SATURATION: 97 % | WEIGHT: 197 LBS | SYSTOLIC BLOOD PRESSURE: 118 MMHG | DIASTOLIC BLOOD PRESSURE: 65 MMHG | BODY MASS INDEX: 31.66 KG/M2 | HEART RATE: 89 BPM | RESPIRATION RATE: 16 BRPM | HEIGHT: 66 IN

## 2020-02-05 VITALS
SYSTOLIC BLOOD PRESSURE: 97 MMHG | TEMPERATURE: 98.6 F | RESPIRATION RATE: 24 BRPM | OXYGEN SATURATION: 90 % | DIASTOLIC BLOOD PRESSURE: 56 MMHG

## 2020-02-05 LAB — GLUCOSE BLD-MCNC: 106 MG/DL (ref 70–108)

## 2020-02-05 PROCEDURE — 6360000002 HC RX W HCPCS: Performed by: STUDENT IN AN ORGANIZED HEALTH CARE EDUCATION/TRAINING PROGRAM

## 2020-02-05 PROCEDURE — 7100000000 HC PACU RECOVERY - FIRST 15 MIN: Performed by: PODIATRIST

## 2020-02-05 PROCEDURE — 2709999900 HC NON-CHARGEABLE SUPPLY: Performed by: PODIATRIST

## 2020-02-05 PROCEDURE — 6370000000 HC RX 637 (ALT 250 FOR IP): Performed by: REGISTERED NURSE

## 2020-02-05 PROCEDURE — 2500000003 HC RX 250 WO HCPCS: Performed by: REGISTERED NURSE

## 2020-02-05 PROCEDURE — 3700000000 HC ANESTHESIA ATTENDED CARE: Performed by: PODIATRIST

## 2020-02-05 PROCEDURE — 7100000011 HC PHASE II RECOVERY - ADDTL 15 MIN: Performed by: PODIATRIST

## 2020-02-05 PROCEDURE — 3600000013 HC SURGERY LEVEL 3 ADDTL 15MIN: Performed by: PODIATRIST

## 2020-02-05 PROCEDURE — C1763 CONN TISS, NON-HUMAN: HCPCS | Performed by: PODIATRIST

## 2020-02-05 PROCEDURE — 3600000003 HC SURGERY LEVEL 3 BASE: Performed by: PODIATRIST

## 2020-02-05 PROCEDURE — 82948 REAGENT STRIP/BLOOD GLUCOSE: CPT

## 2020-02-05 PROCEDURE — 2500000003 HC RX 250 WO HCPCS: Performed by: PODIATRIST

## 2020-02-05 PROCEDURE — 6360000002 HC RX W HCPCS: Performed by: REGISTERED NURSE

## 2020-02-05 PROCEDURE — 3700000001 HC ADD 15 MINUTES (ANESTHESIA): Performed by: PODIATRIST

## 2020-02-05 PROCEDURE — 7100000001 HC PACU RECOVERY - ADDTL 15 MIN: Performed by: PODIATRIST

## 2020-02-05 PROCEDURE — 2580000003 HC RX 258: Performed by: STUDENT IN AN ORGANIZED HEALTH CARE EDUCATION/TRAINING PROGRAM

## 2020-02-05 PROCEDURE — 7100000010 HC PHASE II RECOVERY - FIRST 15 MIN: Performed by: PODIATRIST

## 2020-02-05 DEVICE — CONNECTOR NRV L15MM DIA4MM PORCINE EXTRACELLULAR MTRX: Type: IMPLANTABLE DEVICE | Status: FUNCTIONAL

## 2020-02-05 RX ORDER — ONDANSETRON 2 MG/ML
4 INJECTION INTRAMUSCULAR; INTRAVENOUS EVERY 6 HOURS PRN
Status: CANCELLED | OUTPATIENT
Start: 2020-02-05

## 2020-02-05 RX ORDER — SUCCINYLCHOLINE/SOD CL,ISO/PF 200MG/10ML
SYRINGE (ML) INTRAVENOUS PRN
Status: DISCONTINUED | OUTPATIENT
Start: 2020-02-05 | End: 2020-02-05 | Stop reason: SDUPTHER

## 2020-02-05 RX ORDER — FENTANYL CITRATE 50 UG/ML
INJECTION, SOLUTION INTRAMUSCULAR; INTRAVENOUS PRN
Status: DISCONTINUED | OUTPATIENT
Start: 2020-02-05 | End: 2020-02-05 | Stop reason: SDUPTHER

## 2020-02-05 RX ORDER — SCOLOPAMINE TRANSDERMAL SYSTEM 1 MG/1
PATCH, EXTENDED RELEASE TRANSDERMAL PRN
Status: DISCONTINUED | OUTPATIENT
Start: 2020-02-05 | End: 2020-02-05 | Stop reason: SDUPTHER

## 2020-02-05 RX ORDER — SODIUM CHLORIDE 9 MG/ML
INJECTION, SOLUTION INTRAVENOUS CONTINUOUS
Status: DISCONTINUED | OUTPATIENT
Start: 2020-02-05 | End: 2020-02-05 | Stop reason: HOSPADM

## 2020-02-05 RX ORDER — LIDOCAINE HYDROCHLORIDE AND EPINEPHRINE 10; 10 MG/ML; UG/ML
INJECTION, SOLUTION INFILTRATION; PERINEURAL PRN
Status: DISCONTINUED | OUTPATIENT
Start: 2020-02-05 | End: 2020-02-05 | Stop reason: ALTCHOICE

## 2020-02-05 RX ORDER — SODIUM CHLORIDE 0.9 % (FLUSH) 0.9 %
10 SYRINGE (ML) INJECTION EVERY 12 HOURS SCHEDULED
Status: CANCELLED | OUTPATIENT
Start: 2020-02-05

## 2020-02-05 RX ORDER — SODIUM CHLORIDE 0.9 % (FLUSH) 0.9 %
10 SYRINGE (ML) INJECTION PRN
Status: DISCONTINUED | OUTPATIENT
Start: 2020-02-05 | End: 2020-02-05 | Stop reason: HOSPADM

## 2020-02-05 RX ORDER — LABETALOL 20 MG/4 ML (5 MG/ML) INTRAVENOUS SYRINGE
5 EVERY 10 MIN PRN
Status: DISCONTINUED | OUTPATIENT
Start: 2020-02-05 | End: 2020-02-05 | Stop reason: HOSPADM

## 2020-02-05 RX ORDER — EPHEDRINE SULFATE 50 MG/ML
INJECTION, SOLUTION INTRAVENOUS PRN
Status: DISCONTINUED | OUTPATIENT
Start: 2020-02-05 | End: 2020-02-05 | Stop reason: SDUPTHER

## 2020-02-05 RX ORDER — EPHEDRINE SULFATE/0.9% NACL/PF 50 MG/5 ML
SYRINGE (ML) INTRAVENOUS PRN
Status: DISCONTINUED | OUTPATIENT
Start: 2020-02-05 | End: 2020-02-05 | Stop reason: SDUPTHER

## 2020-02-05 RX ORDER — SODIUM CHLORIDE 0.9 % (FLUSH) 0.9 %
10 SYRINGE (ML) INJECTION PRN
Status: CANCELLED | OUTPATIENT
Start: 2020-02-05

## 2020-02-05 RX ORDER — ACETAMINOPHEN 500 MG
1000 TABLET ORAL EVERY 6 HOURS PRN
Status: CANCELLED | OUTPATIENT
Start: 2020-02-05

## 2020-02-05 RX ORDER — FENTANYL CITRATE 50 UG/ML
50 INJECTION, SOLUTION INTRAMUSCULAR; INTRAVENOUS EVERY 5 MIN PRN
Status: DISCONTINUED | OUTPATIENT
Start: 2020-02-05 | End: 2020-02-05 | Stop reason: HOSPADM

## 2020-02-05 RX ORDER — FENTANYL CITRATE 50 UG/ML
25 INJECTION, SOLUTION INTRAMUSCULAR; INTRAVENOUS EVERY 5 MIN PRN
Status: DISCONTINUED | OUTPATIENT
Start: 2020-02-05 | End: 2020-02-05 | Stop reason: HOSPADM

## 2020-02-05 RX ORDER — BUPIVACAINE HYDROCHLORIDE 5 MG/ML
INJECTION, SOLUTION EPIDURAL; INTRACAUDAL PRN
Status: DISCONTINUED | OUTPATIENT
Start: 2020-02-05 | End: 2020-02-05 | Stop reason: ALTCHOICE

## 2020-02-05 RX ORDER — ONDANSETRON 2 MG/ML
INJECTION INTRAMUSCULAR; INTRAVENOUS PRN
Status: DISCONTINUED | OUTPATIENT
Start: 2020-02-05 | End: 2020-02-05 | Stop reason: SDUPTHER

## 2020-02-05 RX ORDER — MEPERIDINE HYDROCHLORIDE 25 MG/ML
12.5 INJECTION INTRAMUSCULAR; INTRAVENOUS; SUBCUTANEOUS EVERY 5 MIN PRN
Status: DISCONTINUED | OUTPATIENT
Start: 2020-02-05 | End: 2020-02-05 | Stop reason: HOSPADM

## 2020-02-05 RX ORDER — LIDOCAINE HYDROCHLORIDE 20 MG/ML
INJECTION, SOLUTION EPIDURAL; INFILTRATION; INTRACAUDAL; PERINEURAL PRN
Status: DISCONTINUED | OUTPATIENT
Start: 2020-02-05 | End: 2020-02-05 | Stop reason: SDUPTHER

## 2020-02-05 RX ORDER — SODIUM CHLORIDE 0.9 % (FLUSH) 0.9 %
10 SYRINGE (ML) INJECTION EVERY 12 HOURS SCHEDULED
Status: DISCONTINUED | OUTPATIENT
Start: 2020-02-05 | End: 2020-02-05 | Stop reason: HOSPADM

## 2020-02-05 RX ORDER — PROPOFOL 10 MG/ML
INJECTION, EMULSION INTRAVENOUS PRN
Status: DISCONTINUED | OUTPATIENT
Start: 2020-02-05 | End: 2020-02-05 | Stop reason: SDUPTHER

## 2020-02-05 RX ORDER — METOCLOPRAMIDE HYDROCHLORIDE 5 MG/ML
INJECTION INTRAMUSCULAR; INTRAVENOUS PRN
Status: DISCONTINUED | OUTPATIENT
Start: 2020-02-05 | End: 2020-02-05 | Stop reason: SDUPTHER

## 2020-02-05 RX ORDER — DEXAMETHASONE SODIUM PHOSPHATE 4 MG/ML
INJECTION, SOLUTION INTRA-ARTICULAR; INTRALESIONAL; INTRAMUSCULAR; INTRAVENOUS; SOFT TISSUE PRN
Status: DISCONTINUED | OUTPATIENT
Start: 2020-02-05 | End: 2020-02-05 | Stop reason: SDUPTHER

## 2020-02-05 RX ADMIN — EPHEDRINE SULFATE 40 MG: 50 INJECTION INTRAMUSCULAR; INTRAVENOUS; SUBCUTANEOUS at 09:45

## 2020-02-05 RX ADMIN — Medication 10 MG: at 09:37

## 2020-02-05 RX ADMIN — DEXAMETHASONE SODIUM PHOSPHATE 8 MG: 4 INJECTION, SOLUTION INTRAMUSCULAR; INTRAVENOUS at 09:36

## 2020-02-05 RX ADMIN — ONDANSETRON HYDROCHLORIDE 4 MG: 4 INJECTION, SOLUTION INTRAMUSCULAR; INTRAVENOUS at 10:46

## 2020-02-05 RX ADMIN — FAMOTIDINE 20 MG: 10 INJECTION, SOLUTION INTRAVENOUS at 09:36

## 2020-02-05 RX ADMIN — FENTANYL CITRATE 100 MCG: 50 INJECTION, SOLUTION INTRAMUSCULAR; INTRAVENOUS at 10:14

## 2020-02-05 RX ADMIN — SODIUM CHLORIDE: 9 INJECTION, SOLUTION INTRAVENOUS at 10:15

## 2020-02-05 RX ADMIN — SODIUM CHLORIDE: 9 INJECTION, SOLUTION INTRAVENOUS at 09:28

## 2020-02-05 RX ADMIN — FENTANYL CITRATE 100 MCG: 50 INJECTION, SOLUTION INTRAMUSCULAR; INTRAVENOUS at 09:45

## 2020-02-05 RX ADMIN — SCOPALAMINE 1 PATCH: 1 PATCH, EXTENDED RELEASE TRANSDERMAL at 09:36

## 2020-02-05 RX ADMIN — PROPOFOL 200 MG: 10 INJECTION, EMULSION INTRAVENOUS at 09:33

## 2020-02-05 RX ADMIN — CEFAZOLIN 2 G: 10 INJECTION, POWDER, FOR SOLUTION INTRAVENOUS at 09:35

## 2020-02-05 RX ADMIN — METOCLOPRAMIDE 10 MG: 5 INJECTION, SOLUTION INTRAMUSCULAR; INTRAVENOUS at 09:36

## 2020-02-05 RX ADMIN — LIDOCAINE HYDROCHLORIDE 80 MG: 20 INJECTION, SOLUTION EPIDURAL; INFILTRATION; INTRACAUDAL; PERINEURAL at 09:33

## 2020-02-05 RX ADMIN — PROPOFOL 100 MG: 10 INJECTION, EMULSION INTRAVENOUS at 10:12

## 2020-02-05 RX ADMIN — PHENYLEPHRINE HYDROCHLORIDE 200 MCG: 10 INJECTION INTRAVENOUS at 10:22

## 2020-02-05 RX ADMIN — FENTANYL CITRATE 100 MCG: 50 INJECTION, SOLUTION INTRAMUSCULAR; INTRAVENOUS at 09:33

## 2020-02-05 RX ADMIN — Medication 100 MG: at 10:12

## 2020-02-05 ASSESSMENT — PULMONARY FUNCTION TESTS
PIF_VALUE: 7
PIF_VALUE: 1
PIF_VALUE: 23
PIF_VALUE: 2
PIF_VALUE: 25
PIF_VALUE: 22
PIF_VALUE: 1
PIF_VALUE: 26
PIF_VALUE: 2
PIF_VALUE: 2
PIF_VALUE: 24
PIF_VALUE: 25
PIF_VALUE: 25
PIF_VALUE: 24
PIF_VALUE: 26
PIF_VALUE: 24
PIF_VALUE: 2
PIF_VALUE: 25
PIF_VALUE: 26
PIF_VALUE: 25
PIF_VALUE: 2
PIF_VALUE: 23
PIF_VALUE: 24
PIF_VALUE: 3
PIF_VALUE: 24
PIF_VALUE: 27
PIF_VALUE: 21
PIF_VALUE: 27
PIF_VALUE: 7
PIF_VALUE: 25
PIF_VALUE: 25
PIF_VALUE: 3
PIF_VALUE: 18
PIF_VALUE: 15
PIF_VALUE: 25
PIF_VALUE: 24
PIF_VALUE: 25
PIF_VALUE: 2
PIF_VALUE: 1
PIF_VALUE: 4
PIF_VALUE: 24
PIF_VALUE: 7
PIF_VALUE: 23
PIF_VALUE: 2
PIF_VALUE: 24
PIF_VALUE: 3
PIF_VALUE: 27
PIF_VALUE: 11
PIF_VALUE: 6
PIF_VALUE: 22
PIF_VALUE: 2
PIF_VALUE: 24
PIF_VALUE: 2
PIF_VALUE: 2
PIF_VALUE: 15
PIF_VALUE: 5
PIF_VALUE: 2
PIF_VALUE: 25
PIF_VALUE: 24
PIF_VALUE: 2
PIF_VALUE: 24
PIF_VALUE: 24
PIF_VALUE: 22
PIF_VALUE: 26
PIF_VALUE: 0
PIF_VALUE: 0
PIF_VALUE: 24
PIF_VALUE: 3
PIF_VALUE: 12
PIF_VALUE: 2
PIF_VALUE: 24
PIF_VALUE: 24
PIF_VALUE: 2
PIF_VALUE: 26
PIF_VALUE: 5
PIF_VALUE: 25
PIF_VALUE: 1
PIF_VALUE: 2
PIF_VALUE: 2
PIF_VALUE: 24
PIF_VALUE: 25
PIF_VALUE: 24
PIF_VALUE: 2
PIF_VALUE: 25
PIF_VALUE: 2
PIF_VALUE: 3
PIF_VALUE: 24
PIF_VALUE: 3
PIF_VALUE: 5

## 2020-02-05 ASSESSMENT — PAIN - FUNCTIONAL ASSESSMENT: PAIN_FUNCTIONAL_ASSESSMENT: 0-10

## 2020-02-05 ASSESSMENT — PAIN SCALES - GENERAL
PAINLEVEL_OUTOF10: 0

## 2020-02-05 ASSESSMENT — PAIN DESCRIPTION - DESCRIPTORS: DESCRIPTORS: ACHING;CONSTANT

## 2020-02-05 NOTE — H&P
[address] [address]  History and Physical Update    Pt Name: Collette Nevarez  MRN: [id number]  YOB: 1975  Date of evaluation: 2/5/2020    [x] I have examined the patient and reviewed the H&P/Consult and there are no changes to the patient or plans.     [] I have examined the patient and reviewed the H&P/Consult and have noted the following changes:        Dian Felipe DPM  Electronically signed 2/5/2020 at 7:33 AM

## 2020-02-05 NOTE — PROGRESS NOTES
1101 ARRIVED IN PACU FROM O R AFTER GENERAL ANESTHESIA. SEE FLOW SHEET. SCOP PATCH LEFT EAR FOR PONV  1132 TO PHASE 2.    1135 SIPPING DAMEON MIST. [address] DENIES NAUSEA,   1220. IV DC'D. DENIES PAIN OR NAUSEA.   1225 GETTING DRESSED. [address] . VERBALIZE UNDERSTANDING OF. INSTRUCTIONS GIVEN ON SCOP PATCH AND REMOVAL OF.  3515 DISCHARGED TO HOME WITH  BY WHEELCHAIR TO PRIVATE CAR IN GOOD CONDITION . DENIES PAIN .  SAYS LEFT FOOT STILL FEELING NUMB FROM LOCAL INJECTION,

## 2020-02-05 NOTE — ANESTHESIA PRE PROCEDURE
12/11/18   Derek Silva MD   calcium citrate (CALCITRATE) 950 MG tablet Take 1 tablet by mouth daily    Historical Provider, MD   vitamin C (ASCORBIC ACID) 500 MG tablet Take 1,000 mg by mouth daily    Historical Provider, MD   vitamin B-12 (CYANOCOBALAMIN) 1000 MCG tablet Take 1,000 mcg by mouth daily    Historical Provider, MD   magnesium 30 MG tablet Take 900 mg by mouth once    Historical Provider, MD   metFORMIN (GLUCOPHAGE) 500 MG tablet Take 500 mg by mouth daily (with breakfast)    Historical Provider, MD   hydrochlorothiazide (HYDRODIURIL) 12.5 MG tablet Take 12.5 mg by mouth Twice a Week Indications: Dr Mary Henry Provider, MD   aspirin 81 MG tablet Take 1 tablet by mouth daily 6/24/15   Susana Calvillo MD   Potassium Gluconate 595 MG CAPS Take 1 capsule by mouth as needed. Taken with hydrochlorothiazide    Historical Provider, MD       Current medications:    Current Facility-Administered Medications   Medication Dose Route Frequency Provider Last Rate Last Dose    0.9 % sodium chloride infusion   Intravenous Continuous Nayeli Sol, DPM        sodium chloride flush 0.9 % injection 10 mL  10 mL Intravenous 2 times per day Nayeli Sol, DPM        sodium chloride flush 0.9 % injection 10 mL  10 mL Intravenous PRN [place] Sol, DPM        ceFAZolin (ANCEF) 2 g in dextrose 5 % 50 mL IVPB  2 g Intravenous On Call to 69 Crsitina Cardona DPM           Allergies: Allergies   Allergen Reactions    Peach [Prunus Persica] Itching and Swelling    Cinnamon Other (See Comments)     Migraine. Lip Swelling. Throat Itching.      Codeine Hives and Itching    Lamictal [Lamotrigine] Itching    Phenergan [Promethazine Hcl] Other (See Comments)     Gets very aggressive     Tramadol Itching and Other (See Comments)     Gets aggressive         Problem List:    Patient Active Problem List   Diagnosis Code    Breast pain in female N64.4    Asthma J45.909    HTN (hypertension) I10    Bipolar 1 disorder ([facility] F31.9    Arthritis M19.90    Chest pain, atypical R07.89    Pure hypercholesterolemia E78.00    GERD (gastroesophageal reflux disease) K21.9    History of chest pain resolved after protonix Z87.898       Past Medical History:        Diagnosis Date    Allergic rhinitis     Allergy     Anxiety     Arthritis     Asthma     Bipolar disorder ([facility]     Bronchitis 2006    Cancer ([facility] 1992    cervical    Depression     [facility]     Hyperlipidemia     Hypertension     Ligament tear     left foot    Osteoarthritis     PONV (postoperative nausea and vomiting)        Past Surgical History:        Procedure Laterality Date    APPENDECTOMY  1983    BONY PELVIS SURGERY  2011    Screws removed-OIO    BREAST BIOPSY  08/06/2012    Bilateral Breast biopsies x2    BREAST LUMPECTOMY  10/25/2010    Dr. Shalini June of left breast mass    CARPAL TUNNEL RELEASE Left 2012     OIO    ELBOW SURGERY  2012    lt-OIO    ENDOSCOPIC DEBRIDEMENT OF SINONASAL CAVITY  3/30/11    FEMUR SURGERY  july 14th 2011    rt femur rods removed-OIO    FOOT SURGERY Left 02/22/2017    tarsal tunnel decompression, neural lysis posterior tibial nerve, wrapping of nerve with amniotic membrane, ligation of varicosities, neurectomy medial calcaneal nerve branch    FOOT SURGERY Left 01/17/2018    Medial Displacement Calcaneal Osteotomy Left Foot, Plantar Fasciotomy with Topaz Left Foot     HYSTERECTOMY  2007    Dr. Fernando Barron    rt knee-arthoscopy     OSTEOTOMY Left 1/17/2018    MEDIAL DISPLACEMENT CALCANEAL OSTEOTOMY, PLANTAR FASCIOTOMY WITH TOPAZ, GASTROCNEMIS LENGTHING  ALL LEFT FOOT performed by Pérez Wisdom DPM at [facility]  july 2011    had rods and screws removed from leg and hip-OIO    SINUS SURGERY  3/16/11    septoplasty    UPPER GASTROINTESTINAL ENDOSCOPY      2013       Social History:    Social History     Tobacco Use    Smoking status: Never Smoker    Smokeless tobacco: Never Used   Substance Use Topics    Alcohol use: No                                Counseling given: Not Answered      Vital Signs (Current):   Vitals:    02/05/20 0832   BP: (!) 146/75   Pulse: 59   Resp: 16   Temp: 97.2 °F (36.2 °C)   TempSrc: Temporal   SpO2: 98%   Weight: 197 lb (89.4 kg)   Height: 5' 6\" (1.676 m)                                              BP Readings from Last 3 Encounters:   02/05/20 (!) 146/75   08/26/19 (!) 142/93   01/18/19 138/86       NPO Status: Time of last liquid consumption: 2200                        Time of last solid consumption: 2030                        Date of last liquid consumption: 02/04/20                        Date of last solid food consumption: 02/04/20    BMI:   Wt Readings from Last 3 Encounters:   02/05/20 197 lb (89.4 kg)   08/26/19 194 lb (88 kg)   01/18/19 203 lb 4.8 oz (92.2 kg)     Body mass index is 31.8 kg/m².     CBC:   Lab Results   Component Value Date    WBC 5.9 01/24/2020    RBC 5.14 01/24/2020    RBC 5.07 05/31/2012    HGB 13.4 01/24/2020    HCT 45.3 01/24/2020    MCV 88.1 01/24/2020    RDW 13.3 01/24/2020     01/24/2020       CMP:   Lab Results   Component Value Date     01/24/2020    K 3.5 01/24/2020     01/24/2020    CO2 22 01/24/2020    BUN 12 01/24/2020    CREATININE 0.71 01/24/2020    GFRAA >60 01/24/2020    LABGLOM >60 01/24/2020    LABGLOM >90 10/09/2019    GLUCOSE 76 01/24/2020    GLUCOSE 116 05/31/2012    PROT 8.1 01/24/2020    CALCIUM 10.0 01/24/2020    BILITOT 0.42 01/24/2020    ALKPHOS 56 01/24/2020    AST 22 01/24/2020    ALT 21 01/24/2020       POC Tests:   Recent Labs     02/05/20  0838   POCGLU 106       Coags:   Lab Results   Component Value Date    PROTIME 10.1 01/24/2020    INR 0.9 01/24/2020       HCG (If Applicable): No results found for: PREGTESTUR, PREGSERUM, HCG, HCGQUANT     ABGs: No results found for: PHART, PO2ART, XIB4VDP, JTC9YUX, BEART, Z4MFOUAS

## 2020-02-05 NOTE — ANESTHESIA POSTPROCEDURE EVALUATION
Department of Anesthesiology  Postprocedure Note    Patient: Rolando Gaona  MRN: [id number]  YOB: 1975  Date of evaluation: 2/5/2020  Time:  11:43 AM     Procedure Summary     Date:  02/05/20 Room / Location:  [address] [place] [address] 01 [facility]    Anesthesia Start:  5692 Anesthesia Stop:  1104    Procedure:  [address]  LEFT, REMOVAL OF SCREW FROM POSTERIOR ASPECT OF HEEL LEFT (Left ) Diagnosis:  (PAIN DUE TO INTERNAL ORTHOPEDIC PROSTHETIC DEVICES, IMPLANTS & GRAFTS, PLANTAR FASCIAL FIBROMATOSIS)    Surgeon:  Pérez Wisdom DPM Responsible Provider:  Fany Chew DO    Anesthesia Type:  general ASA Status:  2          Anesthesia Type: general    Jorge A Phase I: Jorge A Score: 10    Jorge A Phase II:      Last vitals: Reviewed and per EMR flowsheets.        Anesthesia Post Evaluation    Patient location during evaluation: PACU  Patient participation: complete - patient participated  Level of consciousness: awake and alert  Airway patency: patent  Nausea & Vomiting: no nausea and no vomiting  Complications: no  Cardiovascular status: hemodynamically stable  Respiratory status: acceptable  Hydration status: stable

## 2020-02-06 NOTE — OP NOTE
and Ankle Surgery Resident      D: 02/05/2020 11:07:23       T: 02/05/2020 12:06:40     [email or web address]  Job#: [id number]     Doc#: [id number]    CC:

## 2020-06-27 ENCOUNTER — APPOINTMENT (OUTPATIENT)
Dept: CT IMAGING | Age: 45
End: 2020-06-27
Payer: COMMERCIAL

## 2020-06-27 ENCOUNTER — HOSPITAL ENCOUNTER (EMERGENCY)
Age: 45
Discharge: HOME | End: 2020-06-27
Payer: COMMERCIAL

## 2020-06-27 VITALS
RESPIRATION RATE: 20 BRPM | TEMPERATURE: 98.1 F | HEART RATE: 97 BPM | OXYGEN SATURATION: 100 % | WEIGHT: 200 LBS | BODY MASS INDEX: 32.28 KG/M2 | SYSTOLIC BLOOD PRESSURE: 155 MMHG | DIASTOLIC BLOOD PRESSURE: 98 MMHG

## 2020-06-27 LAB
ALBUMIN SERPL-MCNC: 4.2 G/DL (ref 3.5–5.1)
ALP BLD-CCNC: 66 U/L (ref 38–126)
ALT SERPL-CCNC: 25 U/L (ref 11–66)
AMORPHOUS: ABNORMAL
AMPHETAMINE+METHAMPHETAMINE URINE SCREEN: NEGATIVE
ANION GAP SERPL CALCULATED.3IONS-SCNC: 13 MEQ/L (ref 8–16)
AST SERPL-CCNC: 23 U/L (ref 5–40)
BACTERIA: ABNORMAL /HPF
BARBITURATE QUANTITATIVE URINE: NEGATIVE
BASOPHILS # BLD: 0.4 %
BASOPHILS ABSOLUTE: 0 THOU/MM3 (ref 0–0.1)
BENZODIAZEPINE QUANTITATIVE URINE: NEGATIVE
BILIRUB SERPL-MCNC: 0.5 MG/DL (ref 0.3–1.2)
BILIRUBIN DIRECT: < 0.2 MG/DL (ref 0–0.3)
BILIRUBIN URINE: NEGATIVE
BLOOD, URINE: ABNORMAL
BUN BLDV-MCNC: 10 MG/DL (ref 7–22)
CALCIUM SERPL-MCNC: 9.6 MG/DL (ref 8.5–10.5)
CANNABINOID QUANTITATIVE URINE: NEGATIVE
CASTS UA: ABNORMAL /LPF
CHARACTER, URINE: CLEAR
CHLORIDE BLD-SCNC: 105 MEQ/L (ref 98–111)
CO2: 21 MEQ/L (ref 23–33)
COCAINE METABOLITE QUANTITATIVE URINE: NEGATIVE
COLOR: YELLOW
CREAT SERPL-MCNC: 0.8 MG/DL (ref 0.4–1.2)
CRYSTALS, UA: ABNORMAL
EOSINOPHIL # BLD: 2.4 %
EOSINOPHILS ABSOLUTE: 0.1 THOU/MM3 (ref 0–0.4)
EPITHELIAL CELLS, UA: ABNORMAL /HPF
ERYTHROCYTE [DISTWIDTH] IN BLOOD BY AUTOMATED COUNT: 13.2 % (ref 11.5–14.5)
ERYTHROCYTE [DISTWIDTH] IN BLOOD BY AUTOMATED COUNT: 40.9 FL (ref 35–45)
GLUCOSE BLD-MCNC: 204 MG/DL (ref 70–108)
GLUCOSE URINE: 100 MG/DL
HCT VFR BLD CALC: 44.2 % (ref 37–47)
HEMOGLOBIN: 13.6 GM/DL (ref 12–16)
IMMATURE GRANS (ABS): 0.02 THOU/MM3 (ref 0–0.07)
IMMATURE GRANULOCYTES: 0.4 %
KETONES, URINE: ABNORMAL
LEUKOCYTE ESTERASE, URINE: NEGATIVE
LIPASE: 27.4 U/L (ref 5.6–51.3)
LYMPHOCYTES # BLD: 46.6 %
LYMPHOCYTES ABSOLUTE: 2.5 THOU/MM3 (ref 1–4.8)
MAGNESIUM: 1.7 MG/DL (ref 1.6–2.4)
MCH RBC QN AUTO: 26.2 PG (ref 26–33)
MCHC RBC AUTO-ENTMCNC: 30.8 GM/DL (ref 32.2–35.5)
MCV RBC AUTO: 85.2 FL (ref 81–99)
MONOCYTES # BLD: 6.8 %
MONOCYTES ABSOLUTE: 0.4 THOU/MM3 (ref 0.4–1.3)
MUCUS: ABNORMAL
NITRITE, URINE: NEGATIVE
NUCLEATED RED BLOOD CELLS: 0 /100 WBC
OPIATES, URINE: NEGATIVE
OSMOLALITY CALCULATION: 282.4 MOSMOL/KG (ref 275–300)
OXYCODONE: NEGATIVE
PH UA: 6.5 (ref 5–9)
PHENCYCLIDINE QUANTITATIVE URINE: NEGATIVE
PLATELET # BLD: 216 THOU/MM3 (ref 130–400)
PMV BLD AUTO: 10.6 FL (ref 9.4–12.4)
POTASSIUM SERPL-SCNC: 4.2 MEQ/L (ref 3.5–5.2)
PREGNANCY, SERUM: NEGATIVE
PROTEIN UA: NEGATIVE
RBC # BLD: 5.19 MILL/MM3 (ref 4.2–5.4)
RBC URINE: ABNORMAL /HPF
SEG NEUTROPHILS: 43.4 %
SEGMENTED NEUTROPHILS ABSOLUTE COUNT: 2.3 THOU/MM3 (ref 1.8–7.7)
SODIUM BLD-SCNC: 139 MEQ/L (ref 135–145)
SPECIFIC GRAVITY, URINE: 1.02 (ref 1–1.03)
TOTAL PROTEIN: 6.9 G/DL (ref 6.1–8)
UROBILINOGEN, URINE: 0.2 EU/DL (ref 0–1)
WBC # BLD: 5.4 THOU/MM3 (ref 4.8–10.8)
WBC UA: ABNORMAL /HPF

## 2020-06-27 PROCEDURE — 81001 URINALYSIS AUTO W/SCOPE: CPT

## 2020-06-27 PROCEDURE — 96374 THER/PROPH/DIAG INJ IV PUSH: CPT

## 2020-06-27 PROCEDURE — 83735 ASSAY OF MAGNESIUM: CPT

## 2020-06-27 PROCEDURE — 96375 TX/PRO/DX INJ NEW DRUG ADDON: CPT

## 2020-06-27 PROCEDURE — 82248 BILIRUBIN DIRECT: CPT

## 2020-06-27 PROCEDURE — 6370000000 HC RX 637 (ALT 250 FOR IP): Performed by: PHYSICIAN ASSISTANT

## 2020-06-27 PROCEDURE — 96376 TX/PRO/DX INJ SAME DRUG ADON: CPT

## 2020-06-27 PROCEDURE — 83690 ASSAY OF LIPASE: CPT

## 2020-06-27 PROCEDURE — 6360000002 HC RX W HCPCS: Performed by: PHYSICIAN ASSISTANT

## 2020-06-27 PROCEDURE — 99284 EMERGENCY DEPT VISIT MOD MDM: CPT

## 2020-06-27 PROCEDURE — 36415 COLL VENOUS BLD VENIPUNCTURE: CPT

## 2020-06-27 PROCEDURE — 80307 DRUG TEST PRSMV CHEM ANLYZR: CPT

## 2020-06-27 PROCEDURE — 2580000003 HC RX 258: Performed by: PHYSICIAN ASSISTANT

## 2020-06-27 PROCEDURE — 85025 COMPLETE CBC W/AUTO DIFF WBC: CPT

## 2020-06-27 PROCEDURE — 74176 CT ABD & PELVIS W/O CONTRAST: CPT

## 2020-06-27 PROCEDURE — 84703 CHORIONIC GONADOTROPIN ASSAY: CPT

## 2020-06-27 PROCEDURE — 80053 COMPREHEN METABOLIC PANEL: CPT

## 2020-06-27 RX ORDER — FENTANYL CITRATE 50 UG/ML
50 INJECTION, SOLUTION INTRAMUSCULAR; INTRAVENOUS ONCE
Status: COMPLETED | OUTPATIENT
Start: 2020-06-27 | End: 2020-06-27

## 2020-06-27 RX ORDER — KETOROLAC TROMETHAMINE 10 MG/1
10 TABLET, FILM COATED ORAL EVERY 6 HOURS PRN
Qty: 20 TABLET | Refills: 0 | Status: SHIPPED | OUTPATIENT
Start: 2020-06-27 | End: 2020-07-09 | Stop reason: SDUPTHER

## 2020-06-27 RX ORDER — 0.9 % SODIUM CHLORIDE 0.9 %
1000 INTRAVENOUS SOLUTION INTRAVENOUS ONCE
Status: COMPLETED | OUTPATIENT
Start: 2020-06-27 | End: 2020-06-27

## 2020-06-27 RX ORDER — KETOROLAC TROMETHAMINE 30 MG/ML
30 INJECTION, SOLUTION INTRAMUSCULAR; INTRAVENOUS ONCE
Status: COMPLETED | OUTPATIENT
Start: 2020-06-27 | End: 2020-06-27

## 2020-06-27 RX ORDER — MORPHINE SULFATE 4 MG/ML
4 INJECTION, SOLUTION INTRAMUSCULAR; INTRAVENOUS ONCE
Status: COMPLETED | OUTPATIENT
Start: 2020-06-27 | End: 2020-06-27

## 2020-06-27 RX ORDER — TAMSULOSIN HYDROCHLORIDE 0.4 MG/1
0.4 CAPSULE ORAL DAILY
Qty: 5 CAPSULE | Refills: 0 | Status: SHIPPED | OUTPATIENT
Start: 2020-06-27 | End: 2020-06-29 | Stop reason: SDUPTHER

## 2020-06-27 RX ORDER — ONDANSETRON 4 MG/1
4 TABLET, ORALLY DISINTEGRATING ORAL EVERY 8 HOURS PRN
Qty: 20 TABLET | Refills: 0 | Status: SHIPPED | OUTPATIENT
Start: 2020-06-27 | End: 2022-01-11

## 2020-06-27 RX ORDER — OXYCODONE HYDROCHLORIDE AND ACETAMINOPHEN 5; 325 MG/1; MG/1
1 TABLET ORAL EVERY 6 HOURS PRN
Qty: 12 TABLET | Refills: 0 | Status: SHIPPED | OUTPATIENT
Start: 2020-06-27 | End: 2020-06-30

## 2020-06-27 RX ORDER — TAMSULOSIN HYDROCHLORIDE 0.4 MG/1
0.4 CAPSULE ORAL ONCE
Status: COMPLETED | OUTPATIENT
Start: 2020-06-27 | End: 2020-06-27

## 2020-06-27 RX ORDER — ONDANSETRON 2 MG/ML
4 INJECTION INTRAMUSCULAR; INTRAVENOUS ONCE
Status: COMPLETED | OUTPATIENT
Start: 2020-06-27 | End: 2020-06-27

## 2020-06-27 RX ADMIN — TAMSULOSIN HYDROCHLORIDE 0.4 MG: 0.4 CAPSULE ORAL at 09:46

## 2020-06-27 RX ADMIN — FENTANYL CITRATE 50 MCG: 50 INJECTION, SOLUTION INTRAMUSCULAR; INTRAVENOUS at 09:04

## 2020-06-27 RX ADMIN — KETOROLAC TROMETHAMINE 30 MG: 30 INJECTION, SOLUTION INTRAMUSCULAR at 07:54

## 2020-06-27 RX ADMIN — MORPHINE SULFATE 4 MG: 4 INJECTION, SOLUTION INTRAMUSCULAR; INTRAVENOUS at 09:45

## 2020-06-27 RX ADMIN — ONDANSETRON 4 MG: 2 INJECTION INTRAMUSCULAR; INTRAVENOUS at 09:45

## 2020-06-27 RX ADMIN — SODIUM CHLORIDE 1000 ML: 9 INJECTION, SOLUTION INTRAVENOUS at 07:56

## 2020-06-27 RX ADMIN — ONDANSETRON 4 MG: 2 INJECTION INTRAMUSCULAR; INTRAVENOUS at 07:54

## 2020-06-27 RX ADMIN — FENTANYL CITRATE 50 MCG: 50 INJECTION, SOLUTION INTRAMUSCULAR; INTRAVENOUS at 07:55

## 2020-06-27 ASSESSMENT — PAIN DESCRIPTION - PAIN TYPE
TYPE: ACUTE PAIN
TYPE: ACUTE PAIN

## 2020-06-27 ASSESSMENT — ENCOUNTER SYMPTOMS
SORE THROAT: 0
DIARRHEA: 0
ABDOMINAL PAIN: 1
RHINORRHEA: 0
NAUSEA: 1
PHOTOPHOBIA: 0
VOMITING: 0
BACK PAIN: 1
COUGH: 0
SHORTNESS OF BREATH: 0

## 2020-06-27 ASSESSMENT — PAIN SCALES - GENERAL
PAINLEVEL_OUTOF10: 10
PAINLEVEL_OUTOF10: 4

## 2020-06-27 ASSESSMENT — PAIN DESCRIPTION - FREQUENCY: FREQUENCY: CONTINUOUS

## 2020-06-27 ASSESSMENT — PAIN DESCRIPTION - DESCRIPTORS: DESCRIPTORS: SHARP;STABBING

## 2020-06-27 ASSESSMENT — PAIN DESCRIPTION - PROGRESSION
CLINICAL_PROGRESSION: GRADUALLY IMPROVING
CLINICAL_PROGRESSION: NOT CHANGED

## 2020-06-27 ASSESSMENT — PAIN DESCRIPTION - ONSET: ONSET: PROGRESSIVE

## 2020-06-27 ASSESSMENT — PAIN DESCRIPTION - LOCATION: LOCATION: ABDOMEN;BACK;FLANK

## 2020-06-27 ASSESSMENT — PAIN DESCRIPTION - ORIENTATION: ORIENTATION: LEFT

## 2020-06-27 NOTE — ED PROVIDER NOTES
[address] COMPLAINT       Chief Complaint   Patient presents with    Back Pain    Flank Pain     Left       Nurses Notes reviewed and I agree except as noted in the HPI. HISTORY OF PRESENT ILLNESS    Sada Lopez is a 50 y.o. female who presents to the Emergency Department with complaints of left sided abdominal pain and left lower back pain. Patient complains of abdominal and back pain for one week with sudden worsening, rating pain as a 10/10 at the initial encounter. Patient denies any history of similar pain. Patient denies any history of renal calculi. Patient denies any urinary symptoms including no dysuria, hematuria, urgency, or frequency. Patient also denies diarrhea, while she complains of nausea. Surgical history of the patient includes a previous hysterectomy, upper gastrointestinal endoscopy, and appendectomy, while the patient has no other major abdominal surgical history. Patient presents with a medical history that includes hypertension, hyperlipidemia, asthma, and diabetes mellitus. Patient reports no other pertinent symptoms at the time of the initial encounter. The HPI was provided by the patient. REVIEW OF SYSTEMS     Review of Systems   Constitutional: Negative for appetite change, chills and fever. HENT: Negative for congestion, ear pain, rhinorrhea and sore throat. Eyes: Negative for photophobia. Respiratory: Negative for cough and shortness of breath. Cardiovascular: Negative for chest pain. Gastrointestinal: Positive for abdominal pain (left lower) and nausea. Negative for diarrhea and vomiting. Endocrine: Negative for polyuria. Genitourinary: Negative for difficulty urinating, dysuria, flank pain, frequency, hematuria and urgency. Musculoskeletal: Positive for back pain (left lower). Negative for gait problem. Skin: Negative for rash.    Neurological: Negative for dizziness, weakness and CARMEN Celaya  06/27/20 1199

## 2020-06-29 ENCOUNTER — CARE COORDINATION (OUTPATIENT)
Dept: CARE COORDINATION | Age: 45
End: 2020-06-29

## 2020-06-29 ENCOUNTER — VIRTUAL VISIT (OUTPATIENT)
Dept: UROLOGY | Age: 45
End: 2020-06-29
Payer: COMMERCIAL

## 2020-06-29 PROCEDURE — 99204 OFFICE O/P NEW MOD 45 MIN: CPT | Performed by: NURSE PRACTITIONER

## 2020-06-29 RX ORDER — TAMSULOSIN HYDROCHLORIDE 0.4 MG/1
0.4 CAPSULE ORAL DAILY
Qty: 30 CAPSULE | Refills: 0 | Status: SHIPPED | OUTPATIENT
Start: 2020-06-29 | End: 2022-10-11

## 2020-06-29 NOTE — PROGRESS NOTES
2020    TELEHEALTH EVALUATION -- Audio/Visual (During BBEII-92 public health emergency)  Pt located at [facility]  Provider at [facility] urology      HPI:    Pt here as new pt for kidney stones. Seen in ER on . She has been dealing with left sided back pain for the last week, thought it was sciatica, but it became unbearable Saturday morning. She reports nausea and vomiting with it. Denies fever, had some chills. CT showed mild left hydronephrosis with mild strandy opacity and punctate distal left ureteral stone measuring about 2 mm, near the UVJ. CRT 0.8, WBC 5.4, and Urine showed moderate blood only. She denies any history of stones. She reports the pain has improved somewhat. She hasn't taken anything for pain today. She is out getting groceries at time of appt. She denies any fever or chills. She does report some dysuria/bladder pressure. She is taking flomax. Has not seen stone pass. Christina Rodriguez (:  1975) has requested an audio/video evaluation for the following concern(s):    KIDNEY STONES    Review of Systems    Prior to Visit Medications    Medication Sig Taking? Authorizing Provider   tamsulosin (FLOMAX) 0.4 MG capsule Take 1 capsule by mouth daily for 30 doses Yes SALVATORE Valerio - EDITH   oxyCODONE-acetaminophen (PERCOCET) 5-325 MG per tablet Take 1 tablet by mouth every 6 hours as needed for Pain for up to 3 days. Intended supply: 3 days.  Take lowest dose possible to manage pain Yes CARMEN Allan   ketorolac (TORADOL) 10 MG tablet Take 1 tablet by mouth every 6 hours as needed for Pain Yes CARMEN Valentine   ondansetron (ZOFRAN ODT) 4 MG disintegrating tablet Take 1 tablet by mouth every 8 hours as needed for Nausea Yes CARMEN Allan   olopatadine (PATADAY) 0.2 % SOLN ophthalmic solution Place 1 drop into both eyes daily Please run rebate: RxBIN: [id number] RxPCN: Valeryyesenia Mary Ann: [id number] Issuer: ([id number]) XK#[id number] Yes Duc Apple MD   levocetirizine Garry Ngo) Topics    Alcohol use: No    Drug use: No        Allergies   Allergen Reactions    Peach [Prunus Persica] Itching and Swelling    Cinnamon Other (See Comments)     Migraine. Lip Swelling. Throat Itching.      Codeine Hives and Itching    Lamictal [Lamotrigine] Itching    Phenergan [Promethazine Hcl] Other (See Comments)     Gets very aggressive     Tramadol Itching and Other (See Comments)     Gets aggressive     ,   Past Medical History:   Diagnosis Date    Allergic rhinitis     Allergy     Anxiety     Arthritis     Asthma     [facility] [address]     Bronchitis 2006    Cancer ([facility] [address] 1992    cervical    Depression     Diabetes mellitus ([facility] [address]     Hyperlipidemia     Hypertension     Ligament tear     left foot    Osteoarthritis     PONV (postoperative nausea and vomiting)    ,   Past Surgical History:   Procedure Laterality Date    APPENDECTOMY  1983    BONY PELVIS SURGERY  2011    Screws removed-OIO    BREAST BIOPSY  08/06/2012    Bilateral Breast biopsies x2    BREAST LUMPECTOMY  10/25/2010    Dr. Jose Phelps of left breast mass    CARPAL TUNNEL RELEASE Left 2012     OIO    ELBOW SURGERY  2012    lt-OIO    ENDOSCOPIC DEBRIDEMENT OF SINONASAL CAVITY  3/30/11   [place] FEMUR SURGERY  july 14th 2011    rt femur rods removed-OIO    FOOT SURGERY Left 02/22/2017    tarsal tunnel decompression, neural lysis posterior tibial nerve, wrapping of nerve with amniotic membrane, ligation of varicosities, neurectomy medial calcaneal nerve branch    FOOT SURGERY Left 01/17/2018    Medial Displacement Calcaneal Osteotomy Left Foot, Plantar Fasciotomy with Topaz Left Foot     FOOT SURGERY Left 2/5/2020    EXCISION OF MEDICAL CALCALNEAL NERVE BRANCH  LEFT, REMOVAL OF SCREW FROM POSTERIOR ASPECT OF HEEL LEFT performed by James Walker DPM at [facility]  2007    Dr. Arabella Thompson    rt knee-arthoscopy     OSTEOTOMY Left 1/17/2018    MEDIAL DISPLACEMENT CALCANEAL OSTEOTOMY, PLANTAR FASCIOTOMY WITH TOPAZ, GASTROCNEMIS LENGTHING  ALL LEFT FOOT performed by Chandu Branch DPM at [facility]  july 2011    had rods and screws removed from leg and hip-OIO    SINUS SURGERY  3/16/11    septoplasty    UPPER GASTROINTESTINAL ENDOSCOPY      2013   ,   Social History     Tobacco Use    Smoking status: Never Smoker    Smokeless tobacco: Never Used   Substance Use Topics    Alcohol use: No    Drug use: No       PHYSICAL EXAMINATION:  [ INSTRUCTIONS:  \"[x]\" Indicates a positive item  \"[]\" Indicates a negative item  -- DELETE ALL ITEMS NOT EXAMINED]  Vital Signs: (As obtained by patient/caregiver or practitioner observation)    Blood pressure-  Heart rate-    Respiratory rate-    Temperature-  Pulse oximetry-     Constitutional: [x] Appears well-developed and well-nourished [] No apparent distress      [] Abnormal-   Mental status  [x] Alert and awake  [x] Oriented to person/place/time []Able to follow commands      Eyes:  EOM    []  Normal  [] Abnormal-  Sclera  []  Normal  [] Abnormal -         Discharge [x]  None visible  [] Abnormal -    HENT:   [] Normocephalic, atraumatic.   [] Abnormal   [] Mouth/Throat: Mucous membranes are moist.     External Ears [x] Normal  [] Abnormal-     Neck: [] No visualized mass     Pulmonary/Chest: [x] Respiratory effort normal.  [] No visualized signs of difficulty breathing or respiratory distress        [] Abnormal-      Musculoskeletal:   [] Normal gait with no signs of ataxia         [] Normal range of motion of neck        [] Abnormal-       Neurological:        [] No Facial Asymmetry (Cranial nerve 7 motor function) (limited exam to video visit)          [] No gaze palsy        [] Abnormal-         Skin:        [x] No significant exanthematous lesions or discoloration noted on facial skin         [] Abnormal-            Psychiatric:       [x] Normal Affect [] No Hallucinations        [] Abnormal-     Other pertinent observable physical exam findings-         ASSESSMENT/PLAN:    2 mm left UVJ stone  Mild hydronephrosis      Options of stone management were discussed with pt. Pt is feeling better since ER visit. Pain is improving every day, pain now located in the bladder. Denies any fever or chills. We discussed spontaneous passage vs ureteroscopy. In agreement with MET. Will refill flomax, as she was only given 5 days worth. Encouraged to increase fluid intake. Advised to call office or go to ER for fever or chills or uncontrolled pain. Fu in 2 weeks. Jerzy Mccord is a 50 y.o. female being evaluated by a Virtual Visit (video visit) encounter to address concerns as mentioned above. A caregiver was present when appropriate. Due to this being a TeleHealth encounter (During JANOE-82 public health emergency), evaluation of the following organ systems was limited: Vitals/Constitutional/EENT/Resp/CV/GI//MS/Neuro/Skin/Heme-Lymph-Imm. Pursuant to the emergency declaration under the [address] [place] [address] authority and the [employer] and [employer] Act, this Virtual Visit was conducted with patient's (and/or legal guardian's) consent, to reduce the patient's risk of exposure to COVID-19 and provide necessary medical care. The patient (and/or legal guardian) has also been advised to contact this office for worsening conditions or problems, and seek emergency medical treatment and/or call 911 if deemed necessary. Patient identification was verified at the start of the visit: Yes    Total time spent on this encounter: Rico 9 were provided through a video synchronous discussion virtually to substitute for in-person clinic visit. Patient and provider were located at their individual homes.     --SALVATORE Glover - CNP on 6/29/2020 at 3:38 PM    An electronic signature was used to

## 2020-06-29 NOTE — CARE COORDINATION
Attempted to reach patient for ED follow up in regards to COVID-19 education/ monitoring. Patient was unavailable at the time of my call, and a generic voicemail message was left asking patient to return my call at [phone].

## 2020-07-01 NOTE — CARE COORDINATION
Attempted to reach patient for ED follow up in regards to COVID-19 education/ monitoring. Patient was unavailable at the time of my call, and a generic voicemail message was left asking patient to return my call at [phone]. Will not contact any further at this time.

## 2020-07-06 ENCOUNTER — TELEPHONE (OUTPATIENT)
Dept: UROLOGY | Age: 45
End: 2020-07-06

## 2020-07-06 NOTE — TELEPHONE ENCOUNTER
I refilled her flomax after last visit. So she should have 30 more days of flomax at pharmacy to . Can get KUB and renal US soon with a follow up this week to discuss surgery.      Please contact pt to schedule Renal US and fu appt

## 2020-07-06 NOTE — TELEPHONE ENCOUNTER
Patient thinks the stone is stuck in bladder. She is out of the flomax. She only received 5 of them. She is almost out toradol. She has 2 percocet left but it causes itching. She rated the pain a 4 on scale of 0-10 that increases to 8 in her back on right side and in the bladder. She feels like she is not always empyting her bladder out. She denies fever, chills, nausea, or burning. She does have occasional pain with urination. The urine is clear and she has increased her fluid intake. She would like to have a procedure completed or should there be more imaging completed. She does not think she can pass it. Please advise. Thank you.

## 2020-07-08 ENCOUNTER — HOSPITAL ENCOUNTER (OUTPATIENT)
Dept: GENERAL RADIOLOGY | Age: 45
Discharge: HOME | End: 2020-07-08
Payer: COMMERCIAL

## 2020-07-08 ENCOUNTER — HOSPITAL ENCOUNTER (OUTPATIENT)
Dept: ULTRASOUND IMAGING | Age: 45
Discharge: HOME | End: 2020-07-08
Payer: COMMERCIAL

## 2020-07-08 PROCEDURE — 76770 US EXAM ABDO BACK WALL COMP: CPT

## 2020-07-08 PROCEDURE — 74018 RADEX ABDOMEN 1 VIEW: CPT

## 2020-07-09 ENCOUNTER — OFFICE VISIT (OUTPATIENT)
Dept: UROLOGY | Age: 45
End: 2020-07-09
Payer: COMMERCIAL

## 2020-07-09 VITALS — WEIGHT: 202 LBS | HEIGHT: 66 IN | TEMPERATURE: 96.9 F | BODY MASS INDEX: 32.47 KG/M2

## 2020-07-09 LAB
BILIRUBIN URINE: NEGATIVE
BLOOD URINE, POC: NEGATIVE
CHARACTER, URINE: CLEAR
COLOR, URINE: YELLOW
GFR SERPL CREATININE-BSD FRML MDRD: 77 ML/MIN/1.73M2
GLUCOSE URINE: NEGATIVE MG/DL
KETONES, URINE: NEGATIVE
LEUKOCYTE CLUMPS, URINE: NEGATIVE
NITRITE, URINE: NEGATIVE
PH, URINE: 5.5 (ref 5–9)
POST VOID RESIDUAL (PVR): 25 ML
PROTEIN, URINE: NEGATIVE MG/DL
SPECIFIC GRAVITY, URINE: >= 1.03 (ref 1–1.03)
UROBILINOGEN, URINE: 0.2 EU/DL (ref 0–1)

## 2020-07-09 PROCEDURE — 81003 URINALYSIS AUTO W/O SCOPE: CPT | Performed by: NURSE PRACTITIONER

## 2020-07-09 PROCEDURE — 99214 OFFICE O/P EST MOD 30 MIN: CPT | Performed by: NURSE PRACTITIONER

## 2020-07-09 PROCEDURE — 51798 US URINE CAPACITY MEASURE: CPT | Performed by: NURSE PRACTITIONER

## 2020-07-09 RX ORDER — OXYBUTYNIN CHLORIDE 5 MG/1
5 TABLET, EXTENDED RELEASE ORAL DAILY
Qty: 30 TABLET | Refills: 2 | Status: SHIPPED | OUTPATIENT
Start: 2020-07-09 | End: 2020-11-04 | Stop reason: ALTCHOICE

## 2020-07-09 RX ORDER — KETOROLAC TROMETHAMINE 10 MG/1
10 TABLET, FILM COATED ORAL EVERY 6 HOURS PRN
Qty: 20 TABLET | Refills: 0 | Status: SHIPPED | OUTPATIENT
Start: 2020-07-09 | End: 2022-01-11

## 2020-07-09 NOTE — PROGRESS NOTES
mouth nightly 30 tablet 3    pantoprazole (PROTONIX) 40 MG tablet Take 1 tablet by mouth daily 90 tablet 2    naproxen (NAPROXEN) 500 MG EC tablet Take 1 tablet by mouth 2 times daily as needed for Pain Take medications with food 30 tablet 0    calcium citrate (CALCITRATE) 950 MG tablet Take 1 tablet by mouth daily      vitamin C (ASCORBIC ACID) 500 MG tablet Take 1,000 mg by mouth daily      vitamin B-12 (CYANOCOBALAMIN) 1000 MCG tablet Take 1,000 mcg by mouth daily      magnesium 30 MG tablet Take 900 mg by mouth once      acetaminophen (TYLENOL) 325 MG tablet Take 650 mg by mouth every 6 hours as needed for Pain      amLODIPine (NORVASC) 10 MG tablet Take 10 mg by mouth daily      metFORMIN (GLUCOPHAGE) 500 MG tablet Take 500 mg by mouth daily (with breakfast)      atorvastatin (LIPITOR) 40 MG tablet Take 40 mg by mouth daily      glipiZIDE (GLUCOTROL) 5 MG tablet Take 5 mg by mouth daily      hydrochlorothiazide (HYDRODIURIL) 12.5 MG tablet Take 12.5 mg by mouth Twice a Week Indications: Dr Martin Brewer       metoprolol succinate ER (TOPROL XL) 25 MG XL tablet Take 1 tablet by mouth daily 30 tablet 3    lisinopril (PRINIVIL;ZESTRIL) 20 MG tablet Take 1 tablet by mouth 2 times daily 180 tablet 0    aspirin 81 MG tablet Take 1 tablet by mouth daily 90 tablet 1     No current facility-administered medications for this visit. Past Medical History  Mariajose Cunningham  has a past medical history of Allergic rhinitis, Allergy, Anxiety, Arthritis, Asthma, Bipolar disorder ([facility] [address] Bronchitis, Cancer ([facility] [address] Depression, Diabetes mellitus ([facility] [address] Hyperlipidemia, Hypertension, Ligament tear, Osteoarthritis, and PONV (postoperative nausea and vomiting). Past Surgical History  The patient  has a past surgical history that includes knee surgery (1999); Bony pelvis surgery (2011); sinus surgery (3/16/11); Endoscopic Debridement of Sinonasal Cavi (3/30/11); other surgical history (july 2011);  Femur Surgery (july 14th 2011); Hysterectomy (2007); Appendectomy (1983); Elbow surgery (2012); Breast lumpectomy (10/25/2010); Breast biopsy (08/06/2012); Carpal tunnel release (Left, 2012 ); Upper gastrointestinal endoscopy; Foot surgery (Left, 02/22/2017); Foot surgery (Left, 01/17/2018); osteotomy (Left, 1/17/2018); and Foot surgery (Left, 2/5/2020). Family History  This patient's family history includes Allergies in her father and mother; Arthritis in her mother; Asthma in her father; Diabetes in her maternal grandmother, mother, and paternal grandmother; Hypertension in her maternal grandmother and mother. Social History  Louis Olivo  reports that she has never smoked. She has never used smokeless tobacco. She reports that she does not drink alcohol or use drugs. Subjective:     Review of Systems  No problems with ears, nose or throat. No problems with eyes. No chest pain, shortness of breath, abdominal pain, extremity pain or weakness, and no neurological deficits. No rashes.  symptoms per HPI. The remainder of the review of symptoms is negative. Objective:     PE:   Vitals:    07/09/20 0903   Temp: 96.9 °F (36.1 °C)   TempSrc: Temporal   Weight: 202 lb (91.6 kg)   Height: 5' 6\" (1.676 m)       Constitutional: Alert and oriented times 3, no acute distress and cooperative to examination with appropriate mood and affect. HENT:   Head:        Normocephalic and atraumatic. Mouth/Throat:         Mucous membranes are normal.   Eyes:         EOM are normal. No scleral icterus. PERRLA. Neck:        Supple, symmetrical, trachea midline  Cardiovascular:        Normal rate, regular rhythm, S1 S2 heart sounds. No murmurs, rub, or gallops. Pulmonary/Chest:    . Normal respiratory rate and rhthym. No use of accessory muscles. Abdominal:         Soft. No tenderness. Bowel sounds present. Musculoskeletal:         Normal range of motion. No edema or tenderness of lower extremities.     Extremities: No cyanosis, clubbing, or edema present. Neurological:        Alert and oriented. No cranial nerve deficit. Tonye Cogan Psychiatric:        Normal mood and affect. Labs   Urine dip demonstrates   Results for POC orders placed in visit on 07/09/20   POCT Urinalysis No Micro (Auto)   Result Value Ref Range    Glucose, Ur Negative NEGATIVE mg/dl    Bilirubin Urine Negative     Ketones, Urine Negative NEGATIVE    Specific Gravity, Urine >= 1.030 1.002 - 1.03    Blood, UA POC Negative NEGATIVE    pH, Urine 5.50 5.0 - 9.0    Protein, Urine Negative NEGATIVE mg/dl    Urobilinogen, Urine 0.20 0.0 - 1.0 eu/dl    Nitrite, Urine Negative NEGATIVE    Leukocyte Clumps, Urine Negative NEGATIVE    Color, Urine Yellow YELLOW-STR    Character, Urine Clear CLR-SL.COLLIN   poct post void residual   Result Value Ref Range    post void residual 25 ml        Recent BUN/Creatinine:  Lab Results   Component Value Date    BUN 10 06/27/2020    CREATININE 0.8 06/27/2020       Radiology  The patient has had a KUB which I have reviewed along with its accompanying report. The study demonstrates :      FINDINGS: The bowel gas pattern is normal. There is moderate retained stool throughout the colon. Flank stripes are preserved. There is no pathologic calcification within the abdomen. There are phleboliths in the pelvis.    Incidental note is made of heterotopic ossification at the right hip. Presumed prior trauma to the right hip with deformity of the proximal femur.           Impression   No acute abdominal pathology         RENAL US:           TECHNIQUE: Ultrasound of the kidneys and urinary bladder was performed. Grayscale and color Doppler images were obtained.       COMPARISON: None       FINDINGS: The right kidney measures 9.4 x 4.5 x 5.0 cm and the left kidney measures 10.3 x 4.8 x 4.6 cm. Renal cortical thickness and echogenicity are normal bilaterally.  There is no evidence of hydronephrosis, calculi or intrarenal mass on either side.       Color Doppler demonstrates expected wave forms in the bilateral renal arteries. Arcuate resistive indices are normal bilaterally.       The distended urinary bladder is unremarkable. There is a small amount of postvoid residual urine in the bladder.           Impression   1. Normal bilateral renal ultrasound. 2. Small amount of postvoid residual urine but otherwise unremarkable urinary bladder.             Assessment & Plan:     Kidney stone  Bladder pressure  Flank pain   Urinary frequency    Pt still having left flank pain, and bladder pressure. Seen on 6/27 in ER for flank pain. CT showed distal 2 mm UVJ stone on the left with hydronephrosis. Hasnt seen stone pass. Renal US and KUB negative for stone or hydronephrosis. No fever or chills or n/v. Will start Oxybutynin for bladder pressure and frequency. Fu in 2 weeks to re-evaluate. Continue Flomax and increase fluids. Toradol resent. Fu in  2 weeks. Call if problems arise or fever or chills or uncontrolled pain.      SALVATORE Jones  Urology

## 2020-07-22 ENCOUNTER — OFFICE VISIT (OUTPATIENT)
Dept: UROLOGY | Age: 45
End: 2020-07-22
Payer: COMMERCIAL

## 2020-07-22 VITALS — HEIGHT: 66 IN | BODY MASS INDEX: 32.47 KG/M2 | WEIGHT: 202 LBS | TEMPERATURE: 97.7 F

## 2020-07-22 LAB
BILIRUBIN URINE: NEGATIVE
BLOOD URINE, POC: NEGATIVE
CHARACTER, URINE: CLEAR
COLOR, URINE: YELLOW
GLUCOSE URINE: NEGATIVE MG/DL
KETONES, URINE: NEGATIVE
LEUKOCYTE CLUMPS, URINE: NEGATIVE
NITRITE, URINE: NEGATIVE
PH, URINE: 5.5 (ref 5–9)
PROTEIN, URINE: NEGATIVE MG/DL
SPECIFIC GRAVITY, URINE: 1.01 (ref 1–1.03)
UROBILINOGEN, URINE: 0.2 EU/DL (ref 0–1)

## 2020-07-22 PROCEDURE — 81003 URINALYSIS AUTO W/O SCOPE: CPT | Performed by: NURSE PRACTITIONER

## 2020-07-22 PROCEDURE — 99213 OFFICE O/P EST LOW 20 MIN: CPT | Performed by: NURSE PRACTITIONER

## 2020-07-22 NOTE — PROGRESS NOTES
[id number] Issuer: [id number]) VO#[id number] 1 Bottle 5    levocetirizine (XYZAL) 5 MG tablet Take 1 tablet by mouth nightly 30 tablet 5    olopatadine (PATANASE) 0.6 % SOLN nassl soln 2 sprays by Nasal route 2 times daily 1 Bottle 5    montelukast (SINGULAIR) 10 MG tablet Take 1 tablet by mouth nightly 30 tablet 3    pantoprazole (PROTONIX) 40 MG tablet Take 1 tablet by mouth daily 90 tablet 2    naproxen (NAPROXEN) 500 MG EC tablet Take 1 tablet by mouth 2 times daily as needed for Pain Take medications with food 30 tablet 0    calcium citrate (CALCITRATE) 950 MG tablet Take 1 tablet by mouth daily      acetaminophen (TYLENOL) 325 MG tablet Take 650 mg by mouth every 6 hours as needed for Pain      amLODIPine (NORVASC) 10 MG tablet Take 10 mg by mouth daily      metFORMIN (GLUCOPHAGE) 500 MG tablet Take 500 mg by mouth daily (with breakfast)      atorvastatin (LIPITOR) 40 MG tablet Take 40 mg by mouth daily      glipiZIDE (GLUCOTROL) 5 MG tablet Take 5 mg by mouth daily      hydrochlorothiazide (HYDRODIURIL) 12.5 MG tablet Take 12.5 mg by mouth Twice a Week Indications: Dr Nish Shanks       metoprolol succinate ER (TOPROL XL) 25 MG XL tablet Take 1 tablet by mouth daily 30 tablet 3    lisinopril (PRINIVIL;ZESTRIL) 20 MG tablet Take 1 tablet by mouth 2 times daily 180 tablet 0    aspirin 81 MG tablet Take 1 tablet by mouth daily 90 tablet 1    vitamin C (ASCORBIC ACID) 500 MG tablet Take 1,000 mg by mouth daily      vitamin B-12 (CYANOCOBALAMIN) 1000 MCG tablet Take 1,000 mcg by mouth daily      magnesium 30 MG tablet Take 900 mg by mouth once       No current facility-administered medications for this visit.         Past Medical History  Hali December  has a past medical history of Allergic rhinitis, Allergy, Anxiety, Arthritis, Asthma, Bipolar disorder ([facility] [address] Bronchitis, Cancer ([facility] [address] Depression, Diabetes mellitus ([facility] [address] Hyperlipidemia, Hypertension, Ligament tear, Osteoarthritis, and PONV (postoperative nausea and vomiting). Past Surgical History  The patient  has a past surgical history that includes knee surgery (1999); Bony pelvis surgery (2011); sinus surgery (3/16/11); Endoscopic Debridement of Sinonasal Cavi (3/30/11); other surgical history (july 2011); Femur Surgery (july 14th 2011); Hysterectomy (2007); Appendectomy (1983); Elbow surgery (2012); Breast lumpectomy (10/25/2010); Breast biopsy (08/06/2012); Carpal tunnel release (Left, 2012 ); Upper gastrointestinal endoscopy; Foot surgery (Left, 02/22/2017); Foot surgery (Left, 01/17/2018); osteotomy (Left, 1/17/2018); and Foot surgery (Left, 2/5/2020). Family History  This patient's family history includes Allergies in her father and mother; Arthritis in her mother; Asthma in her father; Diabetes in her maternal grandmother, mother, and paternal grandmother; Hypertension in her maternal grandmother and mother. Social History  Laurie Dickerson  reports that she has never smoked. She has never used smokeless tobacco. She reports that she does not drink alcohol or use drugs. Subjective:     Review of Systems  No problems with ears, nose or throat. No problems with eyes. No chest pain, shortness of breath, abdominal pain, extremity pain or weakness, and no neurological deficits. No rashes.  symptoms per HPI. The remainder of the review of symptoms is negative. Objective:     PE:   Vitals:    07/22/20 1124   Temp: 97.7 °F (36.5 °C)   TempSrc: Temporal   Weight: 202 lb (91.6 kg)   Height: 5' 6\" (1.676 m)       Constitutional: Alert and oriented times 3, no acute distress and cooperative to examination with appropriate mood and affect. HENT:   Head:        Normocephalic and atraumatic. Mouth/Throat:         Mucous membranes are normal.   Eyes:         EOM are normal. No scleral icterus. PERRLA. Neck:        Supple, symmetrical, trachea midline  Cardiovascular:        Normal rate, regular rhythm, S1 S2 heart sounds. No murmurs, rub, or gallops. Pulmonary/Chest:    . Normal respiratory rate and rhthym. No use of accessory muscles. Abdominal:         Soft. No tenderness. Bowel sounds present. Musculoskeletal:         Normal range of motion. No edema or tenderness of lower extremities. Extremities: No cyanosis, clubbing, or edema present. Neurological:        Alert and oriented. Psychiatric:        Normal mood and affect. Labs   Urine dip demonstrates   Results for POC orders placed in visit on 07/22/20   POCT Urinalysis No Micro (Auto)   Result Value Ref Range    Glucose, Ur Negative NEGATIVE mg/dl    Bilirubin Urine Negative     Ketones, Urine Negative NEGATIVE    Specific Gravity, Urine 1.015 1.002 - 1.03    Blood, UA POC Negative NEGATIVE    pH, Urine 5.50 5.0 - 9.0    Protein, Urine Negative NEGATIVE mg/dl    Urobilinogen, Urine 0.20 0.0 - 1.0 eu/dl    Nitrite, Urine Negative NEGATIVE    Leukocyte Clumps, Urine Negative NEGATIVE    Color, Urine Yellow YELLOW-STR    Character, Urine Clear CLR-SL.COLLIN        Recent BUN/Creatinine:  Lab Results   Component Value Date    BUN 10 06/27/2020    CREATININE 0.8 06/27/2020       Radiology  The patient has had a KUB which I have reviewed along with its accompanying report. The study demonstrates :      FINDINGS: The bowel gas pattern is normal. There is moderate retained stool throughout the colon. Flank stripes are preserved. There is no pathologic calcification within the abdomen. There are phleboliths in the pelvis.    Incidental note is made of heterotopic ossification at the right hip. Presumed prior trauma to the right hip with deformity of the proximal femur.           Impression   No acute abdominal pathology         RENAL US:           TECHNIQUE: Ultrasound of the kidneys and urinary bladder was performed. Grayscale and color Doppler images were obtained.       COMPARISON: None       FINDINGS: The right kidney measures 9.4 x 4.5 x 5.0 cm and the left kidney measures 10.3 x 4.8 x 4.6 cm. Renal cortical thickness and echogenicity are normal bilaterally. There is no evidence of hydronephrosis, calculi or intrarenal mass on either side.       Color Doppler demonstrates expected wave forms in the bilateral renal arteries. Arcuate resistive indices are normal bilaterally.       The distended urinary bladder is unremarkable. There is a small amount of postvoid residual urine in the bladder.           Impression   1. Normal bilateral renal ultrasound. 2. Small amount of postvoid residual urine but otherwise unremarkable urinary bladder.             Assessment & Plan:     Kidney stone  Bladder pressure  Flank pain   Urinary frequency    Pt still having left flank pain, and bladder pressure. Symptoms improved for 3 days, now starting to come back. KUB and Renal US negative. We discussed getting CT scan, she would like to see how things go in the next week or 2. She will call with update in 1-2 weeks. She will go to ER or call office for any fever, or chills or uncontrolled pain. No fever or chills or n/v. Can continue Oxybutynin for bladder pain and frequency. .    Fu in 3 months, call in 1 week with update.      SALVATORE Copeland  Urology

## 2020-07-22 NOTE — PATIENT INSTRUCTIONS
You may receive a survey regarding the care you received during your visit. Your input is valuable to us. We encourage you to complete and return your survey. We hope you will choose us in the future for your healthcare needs. Call in 1 week with update.

## 2020-07-27 ENCOUNTER — TELEPHONE (OUTPATIENT)
Dept: UROLOGY | Age: 45
End: 2020-07-27

## 2020-07-27 NOTE — TELEPHONE ENCOUNTER
Patient thinks she is passing another stone. She is having a lot of pain in flank and in the bladder rated 6-7 on scale of 0-10. She denies fever, chills, or vomiting. She does have some nausea. How should she proceed. Please advise. Thank you.

## 2020-08-04 ENCOUNTER — HOSPITAL ENCOUNTER (OUTPATIENT)
Dept: CT IMAGING | Age: 45
Discharge: HOME | End: 2020-08-04
Payer: COMMERCIAL

## 2020-08-04 ENCOUNTER — TELEPHONE (OUTPATIENT)
Dept: UROLOGY | Age: 45
End: 2020-08-04

## 2020-08-04 PROCEDURE — 74176 CT ABD & PELVIS W/O CONTRAST: CPT

## 2020-08-05 ENCOUNTER — TELEPHONE (OUTPATIENT)
Dept: UROLOGY | Age: 45
End: 2020-08-05

## 2020-08-05 NOTE — TELEPHONE ENCOUNTER
Results were given to patient, she states her pain has been getting worse and has been really bad the past 2 days. She is wanting to know what she should do next because she doesn't have a f/u appt scheduled until October. ----- Message from SALVATORE Alexandre CNP sent at 8/5/2020 11:11 AM EDT -----  No hydronephrosis or swelling. No stone seen.

## 2020-08-05 NOTE — TELEPHONE ENCOUNTER
Recommend seeing her pcp if its her flank pain or right sided pain  If its more in the bladder, can schedule fu sooner.

## 2020-11-04 ENCOUNTER — OFFICE VISIT (OUTPATIENT)
Dept: UROLOGY | Age: 45
End: 2020-11-04
Payer: COMMERCIAL

## 2020-11-04 VITALS — WEIGHT: 203.6 LBS | HEIGHT: 66 IN | TEMPERATURE: 98.2 F | BODY MASS INDEX: 32.72 KG/M2

## 2020-11-04 LAB
BILIRUBIN URINE: NEGATIVE
BLOOD URINE, POC: NEGATIVE
CHARACTER, URINE: CLEAR
COLOR, URINE: YELLOW
GLUCOSE URINE: NEGATIVE MG/DL
KETONES, URINE: NEGATIVE
LEUKOCYTE CLUMPS, URINE: NEGATIVE
NITRITE, URINE: NEGATIVE
PH, URINE: 6 (ref 5–9)
PROTEIN, URINE: NEGATIVE MG/DL
SPECIFIC GRAVITY, URINE: 1.02 (ref 1–1.03)
UROBILINOGEN, URINE: 0.2 EU/DL (ref 0–1)

## 2020-11-04 PROCEDURE — 81003 URINALYSIS AUTO W/O SCOPE: CPT | Performed by: NURSE PRACTITIONER

## 2020-11-04 PROCEDURE — 99213 OFFICE O/P EST LOW 20 MIN: CPT | Performed by: NURSE PRACTITIONER

## 2020-11-04 NOTE — PROGRESS NOTES
[address] [facility] [id number]  Dept: [phone]  Loc: [phone]  Visit Date: 11/4/2020      HPI:     Rom Amador is a 50 y.o. with past medical history as listed below who presents today in follow-up for kidney stone. She reports feeling much better. No longer having flank / back pain. Or bladder pressure. Most recent CT showed no stones or hydronephrosis. Old notes:    Seen in ER on 6-27. She has been dealing with left sided back pain for the last week, thought it was sciatica, but it became unbearable Saturday morning. Pain radiated to abdomen. CT showed mild left hydronephrosis with mild strandy opacity and punctate distal left ureteral stone measuring about 2 mm, near the UVJ. CRT 0.8, WBC 5.4, and Urine showed moderate blood only. She denies any history of stones. Melissa Ma comes in today by herself. Hx is obtained from the patient and medical record.        Current Outpatient Medications   Medication Sig Dispense Refill    ketorolac (TORADOL) 10 MG tablet Take 1 tablet by mouth every 6 hours as needed for Pain 20 tablet 0    tamsulosin (FLOMAX) 0.4 MG capsule Take 1 capsule by mouth daily for 30 doses 30 capsule 0    ondansetron (ZOFRAN ODT) 4 MG disintegrating tablet Take 1 tablet by mouth every 8 hours as needed for Nausea 20 tablet 0    olopatadine (PATADAY) 0.2 % SOLN ophthalmic solution Place 1 drop into both eyes daily Please run rebate: RxBIN: [id number] RxPCN: [id number] RxGrp: [id number] Issuer: ([id number]) VQ#[id number] 1 Bottle 5    levocetirizine (XYZAL) 5 MG tablet Take 1 tablet by mouth nightly 30 tablet 5    olopatadine (PATANASE) 0.6 % SOLN nassl soln 2 sprays by Nasal route 2 times daily 1 Bottle 5    montelukast (SINGULAIR) 10 MG tablet Take 1 tablet by mouth nightly 30 tablet 3    pantoprazole (PROTONIX) 40 MG tablet Take 1 tablet by mouth daily 90 tablet 2    naproxen (NAPROXEN) 500 MG EC tablet Take 1 tablet by mouth 2 times daily as needed for Pain Take medications with food 30 tablet 0    vitamin C (ASCORBIC ACID) 500 MG tablet Take 1,000 mg by mouth daily      acetaminophen (TYLENOL) 325 MG tablet Take 650 mg by mouth every 6 hours as needed for Pain      amLODIPine (NORVASC) 10 MG tablet Take 10 mg by mouth daily      metFORMIN (GLUCOPHAGE) 500 MG tablet Take 500 mg by mouth daily (with breakfast)      atorvastatin (LIPITOR) 40 MG tablet Take 40 mg by mouth daily      glipiZIDE (GLUCOTROL) 5 MG tablet Take 5 mg by mouth daily      hydrochlorothiazide (HYDRODIURIL) 12.5 MG tablet Take 12.5 mg by mouth Twice a Week Indications: Dr Marvin Barnes       metoprolol succinate ER (TOPROL XL) 25 MG XL tablet Take 1 tablet by mouth daily 30 tablet 3    lisinopril (PRINIVIL;ZESTRIL) 20 MG tablet Take 1 tablet by mouth 2 times daily 180 tablet 0    aspirin 81 MG tablet Take 1 tablet by mouth daily 90 tablet 1     No current facility-administered medications for this visit. Past Medical History  Steve Chatterjee  has a past medical history of Allergic rhinitis, Allergy, Anxiety, Arthritis, Asthma, Bipolar disorder ([facility] [address] Bronchitis, Cancer ([facility] [address] Depression, Diabetes mellitus ([facility] [address] Hyperlipidemia, Hypertension, Ligament tear, Osteoarthritis, and PONV (postoperative nausea and vomiting). Past Surgical History  The patient  has a past surgical history that includes knee surgery (1999); Bony pelvis surgery (2011); sinus surgery (3/16/11); Endoscopic Debridement of Sinonasal Cavi (3/30/11); other surgical history (july 2011); Femur Surgery (july 14th 2011); Hysterectomy (2007); Appendectomy (1983); Elbow surgery (2012); Breast lumpectomy (10/25/2010); Breast biopsy (08/06/2012); Carpal tunnel release (Left, 2012 ); Upper gastrointestinal endoscopy; Foot surgery (Left, 02/22/2017); Foot surgery (Left, 01/17/2018); osteotomy (Left, 1/17/2018); and Foot surgery (Left, 2/5/2020).     Family History  This patient's family history includes Allergies in her father and mother; Arthritis in her mother; Asthma in her father; Diabetes in her maternal grandmother, mother, and paternal grandmother; Hypertension in her maternal grandmother and mother. Social History  Raymundo Ponce  reports that she has never smoked. She has never used smokeless tobacco. She reports that she does not drink alcohol or use drugs. Subjective:     Review of Systems  No problems with ears, nose or throat. No problems with eyes. No chest pain, shortness of breath, abdominal pain, extremity pain or weakness, and no neurological deficits. No rashes.  symptoms per HPI. The remainder of the review of symptoms is negative. Objective:     PE:   Vitals:    11/04/20 0934   Temp: 98.2 °F (36.8 °C)   Weight: 203 lb 9.6 oz (92.4 kg)   Height: 5' 6\" (1.676 m)       Constitutional: Alert and oriented times 3, no acute distress and cooperative to examination with appropriate mood and affect. HENT:   Head:        Normocephalic and atraumatic. Mouth/Throat:         Mucous membranes are normal.   Eyes:         EOM are normal. No scleral icterus. PERRLA. Neck:        Supple, symmetrical, trachea midline  Cardiovascular:        Normal rate, regular rhythm, S1 S2 heart sounds. No murmurs, rub, or gallops. Pulmonary/Chest:    . Normal respiratory rate and rhthym. No use of accessory muscles. Abdominal:         Soft. No tenderness. Bowel sounds present. Musculoskeletal:         Normal range of motion. No edema or tenderness of lower extremities. Extremities: No cyanosis, clubbing, or edema present. Neurological:        Alert and oriented. Psychiatric:        Normal mood and affect.       Labs   Urine dip demonstrates   Results for POC orders placed in visit on 11/04/20   POCT Urinalysis No Micro (Auto)   Result Value Ref Range    Glucose, Ur Negative NEGATIVE mg/dl    Bilirubin Urine Negative     Ketones, Urine Negative NEGATIVE    Specific Gravity, Urine 1.020 1.002 - 1.030    Blood, UA POC Negative NEGATIVE    pH, Urine 6.00 5.0 - 9.0    Protein, Urine Negative NEGATIVE mg/dl    Urobilinogen, Urine 0.20 0.0 - 1.0 eu/dl    Nitrite, Urine Negative NEGATIVE    Leukocyte Clumps, Urine Negative NEGATIVE    Color, Urine Yellow YELLOW-STRAW    Character, Urine Clear [id number].CLOUD        Recent BUN/Creatinine:  Lab Results   Component Value Date    BUN 10 06/27/2020    CREATININE 0.8 06/27/2020       Radiology  The patient has had a CT which I have reviewed along with its accompanying report. The study demonstrates :           FINDINGS:    Limitations:    Solid organs and hollow viscera evaluation is limited without contrast.    Lung bases    Lung bases are clear undersurface of the heart is unremarkable.         Abdomen pelvis    There are no renal calculi. There is no hydronephrosis. Kidneys appear unremarkable. Adrenal glands are normal.    Liver and spleen are normal.    Gallbladder is normal.    Pancreas normal    Aorta is unremarkable. No retroperitoneal or intra-abdominal lymphadenopathy. No ureteral calculi. Pelvis    Bladder is decompressed. No bladder calculi are seen. No bowel obstruction. Prior appendectomy. Uterus is surgically absent. Small fat-containing right inguinal hernia. Prior open reduction internal fixation right hip with hardware removed. No suspicious bone lesions              Impression    No acute abdominal or pelvic abnormalities. No evidence of nephrolithiasis.                 Assessment & Plan:     Kidney stone  Bladder pressure  Flank pain   Urinary frequency    Pts left flank pain and bladder pressure having improved. No longer taking oxybutynin. CT done 8/4/2020 shows no stones or hydronephrosis. Urine dip negative today. Kidney stone diet prevention hand out given to patient. FU in 6 months. KUB prior.      SALVATORE Boyle  Urology

## 2021-02-02 ENCOUNTER — HOSPITAL ENCOUNTER (OUTPATIENT)
Age: 46
Setting detail: SPECIMEN
Discharge: HOME | End: 2021-02-02
Payer: COMMERCIAL

## 2021-02-02 LAB
ABSOLUTE EOS #: 0.17 K/UL (ref 0–0.44)
ABSOLUTE IMMATURE GRANULOCYTE: <0.03 K/UL (ref 0–0.3)
ABSOLUTE LYMPH #: 2.96 K/UL (ref 1.1–3.7)
ABSOLUTE MONO #: 0.42 K/UL (ref 0.1–1.2)
ANION GAP SERPL CALCULATED.3IONS-SCNC: 16 MMOL/L (ref 9–17)
BASOPHILS # BLD: 0 % (ref 0–2)
BASOPHILS ABSOLUTE: <0.03 K/UL (ref 0–0.2)
BUN BLDV-MCNC: 11 MG/DL (ref 6–20)
BUN/CREAT BLD: ABNORMAL (ref 9–20)
CALCIUM SERPL-MCNC: 9.3 MG/DL (ref 8.6–10.4)
CHLORIDE BLD-SCNC: 103 MMOL/L (ref 98–107)
CHOLESTEROL/HDL RATIO: 4.2
CHOLESTEROL: 194 MG/DL
CO2: 21 MMOL/L (ref 20–31)
CREAT SERPL-MCNC: 0.73 MG/DL (ref 0.5–0.9)
DIFFERENTIAL TYPE: ABNORMAL
EOSINOPHILS RELATIVE PERCENT: 3 % (ref 1–4)
GFR AFRICAN AMERICAN: >60 ML/MIN
GFR NON-AFRICAN AMERICAN: >60 ML/MIN
GFR SERPL CREATININE-BSD FRML MDRD: ABNORMAL ML/MIN/{1.73_M2}
GFR SERPL CREATININE-BSD FRML MDRD: ABNORMAL ML/MIN/{1.73_M2}
GLUCOSE BLD-MCNC: 151 MG/DL (ref 70–99)
HCT VFR BLD CALC: 47 % (ref 36.3–47.1)
HDLC SERPL-MCNC: 46 MG/DL
HEMOGLOBIN: 13.9 G/DL (ref 11.9–15.1)
IMMATURE GRANULOCYTES: 0 %
LDL CHOLESTEROL: 114 MG/DL (ref 0–130)
LYMPHOCYTES # BLD: 45 % (ref 24–43)
MCH RBC QN AUTO: 25.6 PG (ref 25.2–33.5)
MCHC RBC AUTO-ENTMCNC: 29.6 G/DL (ref 28.4–34.8)
MCV RBC AUTO: 86.7 FL (ref 82.6–102.9)
MONOCYTES # BLD: 7 % (ref 3–12)
NRBC AUTOMATED: 0 PER 100 WBC
PDW BLD-RTO: 13.6 % (ref 11.8–14.4)
PLATELET # BLD: 200 K/UL (ref 138–453)
PLATELET ESTIMATE: ABNORMAL
PMV BLD AUTO: 11.5 FL (ref 8.1–13.5)
POTASSIUM SERPL-SCNC: 4.1 MMOL/L (ref 3.7–5.3)
RBC # BLD: 5.42 M/UL (ref 3.95–5.11)
RBC # BLD: ABNORMAL 10*6/UL
SEG NEUTROPHILS: 45 % (ref 36–65)
SEGMENTED NEUTROPHILS ABSOLUTE COUNT: 2.89 K/UL (ref 1.5–8.1)
SODIUM BLD-SCNC: 140 MMOL/L (ref 135–144)
TRIGL SERPL-MCNC: 168 MG/DL
TSH SERPL DL<=0.05 MIU/L-ACNC: 2.26 MIU/L (ref 0.3–5)
VLDLC SERPL CALC-MCNC: ABNORMAL MG/DL (ref 1–30)
WBC # BLD: 6.5 K/UL (ref 3.5–11.3)
WBC # BLD: ABNORMAL 10*3/UL

## 2021-09-15 ENCOUNTER — HOSPITAL ENCOUNTER (OUTPATIENT)
Dept: GENERAL RADIOLOGY | Age: 46
Discharge: HOME | End: 2021-09-15
Payer: COMMERCIAL

## 2021-09-15 ENCOUNTER — HOSPITAL ENCOUNTER (OUTPATIENT)
Age: 46
Discharge: HOME | End: 2021-09-15
Payer: COMMERCIAL

## 2021-09-15 DIAGNOSIS — M54.50 LOW BACK PAIN, UNSPECIFIED BACK PAIN LATERALITY, UNSPECIFIED CHRONICITY, UNSPECIFIED WHETHER SCIATICA PRESENT: ICD-10-CM

## 2021-09-15 PROCEDURE — 72100 X-RAY EXAM L-S SPINE 2/3 VWS: CPT

## 2021-12-08 ENCOUNTER — OFFICE VISIT (OUTPATIENT)
Dept: PHYSICAL MEDICINE AND REHAB | Age: 46
End: 2021-12-08
Payer: COMMERCIAL

## 2021-12-08 VITALS
DIASTOLIC BLOOD PRESSURE: 78 MMHG | BODY MASS INDEX: 32.62 KG/M2 | SYSTOLIC BLOOD PRESSURE: 130 MMHG | WEIGHT: 203 LBS | HEART RATE: 68 BPM | HEIGHT: 66 IN

## 2021-12-08 DIAGNOSIS — G62.9 NEUROPATHY: ICD-10-CM

## 2021-12-08 DIAGNOSIS — M16.52 POST-TRAUMATIC OSTEOARTHRITIS OF LEFT HIP: ICD-10-CM

## 2021-12-08 DIAGNOSIS — M53.3 SACROILIAC JOINT DYSFUNCTION: ICD-10-CM

## 2021-12-08 DIAGNOSIS — M47.816 SPONDYLOSIS OF LUMBAR REGION WITHOUT MYELOPATHY OR RADICULOPATHY: Primary | ICD-10-CM

## 2021-12-08 DIAGNOSIS — M46.1 SI (SACROILIAC) JOINT INFLAMMATION (HCC): ICD-10-CM

## 2021-12-08 DIAGNOSIS — G89.4 CHRONIC PAIN SYNDROME: ICD-10-CM

## 2021-12-08 PROCEDURE — 99214 OFFICE O/P EST MOD 30 MIN: CPT | Performed by: NURSE PRACTITIONER

## 2021-12-08 RX ORDER — GABAPENTIN 300 MG/1
300 CAPSULE ORAL NIGHTLY
Qty: 30 CAPSULE | Refills: 1 | Status: SHIPPED | OUTPATIENT
Start: 2021-12-08 | End: 2022-10-11

## 2021-12-08 ASSESSMENT — ENCOUNTER SYMPTOMS
SINUS PRESSURE: 0
PHOTOPHOBIA: 0
SORE THROAT: 0
EYE PAIN: 0
VOMITING: 0
RHINORRHEA: 0
SHORTNESS OF BREATH: 0
NAUSEA: 0
BACK PAIN: 1
COUGH: 0
WHEEZING: 0
DIARRHEA: 0
CHEST TIGHTNESS: 0
COLOR CHANGE: 0
CONSTIPATION: 0
ABDOMINAL PAIN: 0

## 2021-12-08 NOTE — PROGRESS NOTES
[address] [facility] [address]  Dept: [phone]  Dept Fax: [phone]: [phone]    Visit Date: 12/8/2021    Nba Fu is a 48 y.o. female who is referred for pain management evaluation and treatment per Dr. Willy Chatman. CAGE and CAGE-AID Questions   1. In the last three months, have you felt you should cut down or stop drinking or using drugs? Yes []        No [x]     2. In the last three months, has anyone annoyed you or gotten on your nerves by telling you to cut down or stop drinking or using drugs? Yes []        No [x]     3. In the last three months, have you felt guilty or bad about how much you drink or use drugs? Yes []        No [x]     4. In the last three months, have you been waking up wanting to have an alcoholic drink or use drugs? Yes []        No [x]        Opioid Risk Tool:  Clinician Form       1. Family History of Substance Abuse: Female Male    Alcohol   []1   []3    Illegal drugs   []2   []3    Prescription drugs     []4   []4   2. Personal History of Substance Abuse:          Alcohol   []3   []3    Illegal drugs   []4   []4    Prescription drugs     []5   []5   3. Age (riddhi box if between 12 and 39):     []1   []1   4. History of Preadolescent Sexual Abuse:     []3   []0   5. Psychological Disease:      Attention deficit disorder, obsessive-compulsive disorder, bipolar, schizophrenia   [x]2   []2      Depression     [x]1   []1    Scoring Totals 3      Total Score  Low Risk  Moderate Risk  High Risk   Risk Category   0 - 3   4 - 7   8 or Above      Patient states symptoms interfere with:  A.  General Activity:  yes   B. Mood: yes    C. Walking Ability:   yes   D. Normal Work (Includes both work outside the home and housework):   yes    E.  Relations with Other People:  yes   F. Sleep:   yes   G.  Enjoyment of Life:  yes       HPI: rhinorrhea, sinus pressure and sore throat. Eyes: Negative for photophobia, pain and visual disturbance. Respiratory: Negative for cough, chest tightness, shortness of breath and wheezing. ASTHMA   Cardiovascular: Negative for chest pain and palpitations. HTN   Gastrointestinal: Negative for abdominal pain, constipation, diarrhea, nausea and vomiting. ulcer   Endocrine: Negative for cold intolerance, heat intolerance, polydipsia, polyphagia and polyuria. DM 2   Genitourinary: Negative for decreased urine volume, difficulty urinating, frequency and hematuria. H/o  Kidney stones in 2020   Musculoskeletal: Positive for arthralgias, back pain, gait problem and myalgias. Negative for joint swelling, neck pain and neck stiffness. Skin: Negative for color change and rash. Allergic/Immunologic: Negative for food allergies and immunocompromised state. Neurological: Negative for dizziness, tremors, seizures, syncope, facial asymmetry, speech difficulty, weakness, light-headedness, numbness and headaches. Hematological: Negative. Does not bruise/bleed easily. Psychiatric/Behavioral: Negative for agitation, behavioral problems, confusion, decreased concentration, dysphoric mood, hallucinations, self-injury, sleep disturbance and suicidal ideas. The patient is nervous/anxious. The patient is not hyperactive. Bipolar       Objective:     Vitals:    12/08/21 1400   BP: 130/78   Site: Left Upper Arm   Position: Sitting   Cuff Size: Large Adult   Pulse: 68   Weight: 203 lb (92.1 kg)   Height: 5' 6\" (1.676 m)       Physical Exam  Vitals and nursing note reviewed. Constitutional:       General: She is not in acute distress. Appearance: She is well-developed. She is not diaphoretic. HENT:      Head: Normocephalic and atraumatic.       Right Ear: External ear normal.      Left Ear: External ear normal.      Nose: Nose normal.      Mouth/Throat:      Pharynx: No oropharyngeal exudate. Eyes:      General: No scleral icterus. Right eye: No discharge. Left eye: No discharge. Conjunctiva/sclera: Conjunctivae normal.      Pupils: Pupils are equal, round, and reactive to light. Neck:      Thyroid: No thyromegaly. Cardiovascular:      Rate and Rhythm: Normal rate and regular rhythm. Heart sounds: Normal heart sounds. No murmur heard. No friction rub. No gallop. Pulmonary:      Effort: Pulmonary effort is normal. No respiratory distress. Breath sounds: Normal breath sounds. No wheezing or rales. Chest:      Chest wall: No tenderness. Abdominal:      General: Bowel sounds are normal. There is no distension. Palpations: Abdomen is soft. Tenderness: There is no abdominal tenderness. There is no guarding or rebound. Musculoskeletal:         General: Tenderness present. Left shoulder: Tenderness, bony tenderness and crepitus present. Decreased strength. Cervical back: Normal range of motion and neck supple. Tenderness and bony tenderness present. No edema, erythema or rigidity. Pain with movement present. No muscular tenderness. Normal range of motion. Thoracic back: Spasms, tenderness and bony tenderness present. Scoliosis present. Lumbar back: Spasms, tenderness and bony tenderness present. Back:       Right hip: Tenderness and bony tenderness present. Decreased range of motion. Decreased strength. Left hip: Tenderness and bony tenderness present. Decreased range of motion. Decreased strength. Right knee: Bony tenderness and crepitus present. Decreased range of motion. Tenderness present. Left knee: Bony tenderness present. Decreased range of motion. Tenderness present. Medial joint line tenderness: h/o left foot issues 3 surgeries follows Dr Della Jerry. Right foot: Normal.      Left foot: Decreased range of motion. Tenderness and bony tenderness present.       Comments: H/o crushed pelvis bilateral about 20 yrs ago   Skin:     General: Skin is warm. Coloration: Skin is not pale. Findings: No erythema or rash. Neurological:      Mental Status: She is alert and oriented to person, place, and time. She is not disoriented. Cranial Nerves: No cranial nerve deficit. Sensory: No sensory deficit. Motor: No atrophy or abnormal muscle tone. Coordination: Coordination normal.      Gait: Gait normal.      Deep Tendon Reflexes: Reflexes are normal and symmetric. Babinski sign absent on the right side. Psychiatric:         Attention and Perception: She is attentive. Mood and Affect: Mood is not anxious or depressed. Affect is not labile, blunt, angry or inappropriate. Speech: She is communicative. Speech is not rapid and pressured, delayed, slurred or tangential.         Behavior: Behavior is not agitated, slowed, aggressive, withdrawn, hyperactive or combative. Thought Content: Thought content is not paranoid or delusional. Thought content does not include homicidal or suicidal ideation. Thought content does not include homicidal or suicidal plan. Cognition and Memory: Memory is not impaired. She does not exhibit impaired recent memory or impaired remote memory. Judgment: Judgment is not impulsive or inappropriate. EVELYN  Patricks test  positive  Yeoman's  positive  Gaenslen's  positive  Kemps  positive  Spurlings  na  Phalens Reverse Phalens  na  Tinel test  na       Assessment:     1. Spondylosis of lumbar region without myelopathy or radiculopathy    2. Sacroiliac joint dysfunction    3. SI (sacroiliac) joint inflammation (HCC)    4. Chronic pain syndrome    5. Neuropathy    6. Post-traumatic osteoarthritis of left hip            Plan:      · Patient read and signed orientation and opioid agreement. · OARRS reviewed. Current MED:0  · Patient was not offered naloxone for home.    · Discussed long term side effects of medications, tolerance, dependency and addiction. · UDS preformed today. · Patient told can not receive any pain medications from any other source. · No evidence of abuse, diversion or aberrant behavior. · Prescription Needs: No prescription pain medications at this time   Medications and/or procedures to improve function and quality of life- patient understanding with this and that may not be pain free   Discussed possible weaning of medication dosing dependent on treatment/procedure results.  Discussed with patient about safe storage of medications at home   Testing: reviewed Lumbar HIP XR    Procedures: discussed Lumbar facet MBB or SI injection or hip injection   Discussed with patient about risks with procedure including infection, reaction to medication, increased pain, or bleeding.  Medications:Trial Neurontin HS only medication reviewed, for left foot nerve pain Pt takes NSAIDS and Flexeril    If patient is on blood thinners will need approval to hold: yes or no:N/A   Discussed chiropractor for rib pain           Meds. Prescribed:   Orders Placed This Encounter   Medications    gabapentin (NEURONTIN) 300 MG capsule     Sig: Take 1 capsule by mouth nightly for 30 days. Dispense:  30 capsule     Refill:  1       Return in about 2 months (around 2/8/2022) for F/U PT.          Electronically signed by SALVATORE Nicole CNP on 12/8/2021 at 2:52 PM

## 2021-12-27 ENCOUNTER — APPOINTMENT (OUTPATIENT)
Dept: PHYSICAL THERAPY | Age: 46
End: 2021-12-27
Payer: COMMERCIAL

## 2021-12-29 ENCOUNTER — HOSPITAL ENCOUNTER (OUTPATIENT)
Dept: PHYSICAL THERAPY | Age: 46
Setting detail: THERAPIES SERIES
Discharge: HOME | End: 2021-12-29
Payer: COMMERCIAL

## 2021-12-29 PROCEDURE — 97162 PT EVAL MOD COMPLEX 30 MIN: CPT
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
## 2022-01-04 ENCOUNTER — HOSPITAL ENCOUNTER (OUTPATIENT)
Dept: PHYSICAL THERAPY | Age: 47
Setting detail: THERAPIES SERIES
End: 2022-01-04
Payer: COMMERCIAL

## 2022-01-06 ENCOUNTER — HOSPITAL ENCOUNTER (OUTPATIENT)
Dept: PHYSICAL THERAPY | Age: 47
Setting detail: THERAPIES SERIES
Discharge: HOME | End: 2022-01-06
Payer: COMMERCIAL

## 2022-01-06 PROCEDURE — 97113 AQUATIC THERAPY/EXERCISES: CPT
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
## 2022-01-10 ENCOUNTER — HOSPITAL ENCOUNTER (OUTPATIENT)
Dept: PHYSICAL THERAPY | Age: 47
Setting detail: THERAPIES SERIES
Discharge: HOME | End: 2022-01-10
Payer: COMMERCIAL

## 2022-01-10 PROCEDURE — 97113 AQUATIC THERAPY/EXERCISES: CPT

## 2022-01-13 ENCOUNTER — HOSPITAL ENCOUNTER (OUTPATIENT)
Dept: PHYSICAL THERAPY | Age: 47
Setting detail: THERAPIES SERIES
Discharge: HOME | End: 2022-01-13
Payer: COMMERCIAL

## 2022-01-13 PROCEDURE — 97113 AQUATIC THERAPY/EXERCISES: CPT
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
## 2022-01-17 ENCOUNTER — HOSPITAL ENCOUNTER (OUTPATIENT)
Dept: PHYSICAL THERAPY | Age: 47
Setting detail: THERAPIES SERIES
Discharge: HOME | End: 2022-01-17
Payer: COMMERCIAL

## 2022-01-17 PROCEDURE — 97113 AQUATIC THERAPY/EXERCISES: CPT
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
## 2022-01-19 ENCOUNTER — HOSPITAL ENCOUNTER (OUTPATIENT)
Dept: PHYSICAL THERAPY | Age: 47
Setting detail: THERAPIES SERIES
Discharge: HOME | End: 2022-01-19
Payer: COMMERCIAL

## 2022-01-19 PROCEDURE — 97113 AQUATIC THERAPY/EXERCISES: CPT
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
## 2022-01-24 ENCOUNTER — HOSPITAL ENCOUNTER (OUTPATIENT)
Dept: PHYSICAL THERAPY | Age: 47
Setting detail: THERAPIES SERIES
Discharge: HOME | End: 2022-01-24
Payer: COMMERCIAL

## 2022-01-24 PROCEDURE — 97113 AQUATIC THERAPY/EXERCISES: CPT
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
## 2022-01-26 ENCOUNTER — HOSPITAL ENCOUNTER (OUTPATIENT)
Dept: PHYSICAL THERAPY | Age: 47
Setting detail: THERAPIES SERIES
Discharge: HOME | End: 2022-01-26
Payer: COMMERCIAL

## 2022-01-26 PROCEDURE — 97110 THERAPEUTIC EXERCISES: CPT
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
## 2022-02-02 ENCOUNTER — HOSPITAL ENCOUNTER (OUTPATIENT)
Dept: PHYSICAL THERAPY | Age: 47
Setting detail: THERAPIES SERIES
Discharge: HOME | End: 2022-02-02
Payer: COMMERCIAL

## 2022-02-02 PROCEDURE — 97110 THERAPEUTIC EXERCISES: CPT
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
## 2022-02-07 ENCOUNTER — HOSPITAL ENCOUNTER (OUTPATIENT)
Dept: PHYSICAL THERAPY | Age: 47
Setting detail: THERAPIES SERIES
Discharge: HOME | End: 2022-02-07
Payer: COMMERCIAL

## 2022-02-07 PROCEDURE — 97110 THERAPEUTIC EXERCISES: CPT
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
## 2022-02-09 ENCOUNTER — HOSPITAL ENCOUNTER (OUTPATIENT)
Dept: PHYSICAL THERAPY | Age: 47
Setting detail: THERAPIES SERIES
Discharge: HOME | End: 2022-02-09
Payer: COMMERCIAL

## 2022-02-09 PROCEDURE — 97110 THERAPEUTIC EXERCISES: CPT
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
## 2022-02-14 ENCOUNTER — HOSPITAL ENCOUNTER (OUTPATIENT)
Dept: PHYSICAL THERAPY | Age: 47
Setting detail: THERAPIES SERIES
Discharge: HOME | End: 2022-02-14
Payer: COMMERCIAL

## 2022-02-14 PROCEDURE — 97032 APPL MODALITY 1+ESTIM EA 15: CPT

## 2022-02-14 PROCEDURE — 97110 THERAPEUTIC EXERCISES: CPT
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
## 2022-02-16 ENCOUNTER — TELEPHONE (OUTPATIENT)
Dept: PHYSICAL MEDICINE AND REHAB | Age: 47
End: 2022-02-16

## 2022-02-16 ENCOUNTER — OFFICE VISIT (OUTPATIENT)
Dept: PHYSICAL MEDICINE AND REHAB | Age: 47
End: 2022-02-16
Payer: COMMERCIAL

## 2022-02-16 VITALS
DIASTOLIC BLOOD PRESSURE: 82 MMHG | WEIGHT: 200 LBS | HEIGHT: 66 IN | SYSTOLIC BLOOD PRESSURE: 126 MMHG | HEART RATE: 64 BPM | BODY MASS INDEX: 32.14 KG/M2

## 2022-02-16 DIAGNOSIS — M47.816 SPONDYLOSIS OF LUMBAR REGION WITHOUT MYELOPATHY OR RADICULOPATHY: ICD-10-CM

## 2022-02-16 DIAGNOSIS — G62.9 NEUROPATHY: ICD-10-CM

## 2022-02-16 DIAGNOSIS — G89.4 CHRONIC PAIN SYNDROME: ICD-10-CM

## 2022-02-16 DIAGNOSIS — M53.3 SACROILIAC JOINT DYSFUNCTION: ICD-10-CM

## 2022-02-16 DIAGNOSIS — M16.52 POST-TRAUMATIC OSTEOARTHRITIS OF LEFT HIP: ICD-10-CM

## 2022-02-16 DIAGNOSIS — M46.1 SI (SACROILIAC) JOINT INFLAMMATION (HCC): Primary | ICD-10-CM

## 2022-02-16 PROCEDURE — 99214 OFFICE O/P EST MOD 30 MIN: CPT | Performed by: NURSE PRACTITIONER

## 2022-02-16 ASSESSMENT — ENCOUNTER SYMPTOMS
WHEEZING: 0
VOMITING: 0
PHOTOPHOBIA: 0
SHORTNESS OF BREATH: 0
DIARRHEA: 0
EYE PAIN: 0
COLOR CHANGE: 0
COUGH: 0
CONSTIPATION: 0
SORE THROAT: 0
SINUS PRESSURE: 0
NAUSEA: 0
ABDOMINAL PAIN: 0
RHINORRHEA: 0
BACK PAIN: 1
CHEST TIGHTNESS: 0
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
## 2022-02-18 ENCOUNTER — HOSPITAL ENCOUNTER (OUTPATIENT)
Dept: PHYSICAL THERAPY | Age: 47
Setting detail: THERAPIES SERIES
Discharge: HOME | End: 2022-02-18
Payer: COMMERCIAL

## 2022-02-18 PROCEDURE — 97110 THERAPEUTIC EXERCISES: CPT

## 2022-02-18 NOTE — DISCHARGE SUMMARY
catching in back. Difficulty sleeping, difficulty sitting and walking. Patient reports of discomfort with pain to left of spine. Also noted tenderness at left SI, lumbosacral and left hip (greater trochanter region). Notes a few \"zingers\" into RLE posterior thigh to calf region intermittent, Left low back and into buttock (intermittent). Management of pain: heat, NSAIDS, Flexeril, Neurontin. Plans to go to chiropractor for her ribs. Use of CBD lotion that she rubs on sore areas. SUBJECTIVE:  Patient to have injections on her back through pain management with Dr. Edmund Quiroga on 3/15/2022. She did have a follow up regarding with her shoulder and doctor stated she did not have any tears or fractures at her left shoulder. She did get orders for therapy for her left shoulder. Back is doing pretty good. Pain level at this time at left low back 2/10. Denies of symptoms down LE. No longer getting \"zingers\". Notes depending on activity level during day like standing/walking with pressure on it she can have difficulty sleeping, sitting and walking later in the day. Patient taking Ibuprofen and Flexeril which seems to help. Has not had to take Neurontin as much due to making her too drowsy but does take before bedtime. PT has helped with pain management. Notes activity level remains limited and watches activities that may trigger the back pain. Feels like core strength and leg strength is better. Notes still catching occasional at her back when she bends forward. Objective:  TREATMENT   Precautions: Left lateral lumbar and lumbosacral region, SI, left hip pain. Pain: 3/10 left side lower back pain, 6-7/10 Left shoulder. Right low back 0/10     X in shaded column indicates activity completed today   Modalities Parameters/  Location  Notes   HP seated with 2 inch step under feet.        IFC e-stim with HP to bilateral LS region and mid gluteal region, glut max, piriformis region  of patient's lower back Intensity 15.5 X Concurrent with exercises. No adverse skin reactions noted pre or post treatment         Manual Therapy Time/Technique  Notes                     Exercise/Intervention   Notes   Seated stabilization with moist heat:       Ankle PF/DF core engaged. Arms across chest 10x      Reciprocal LAQs arms across chest with core engaged. 10x      Seated marches with core engaged and shoulders at 90.  10x             Mat exercises:       Abdominal bracing  10x  x HEP REVIEW OF ALL EXERCISES   PPT- Hooklying with breath exhaling posterior tilt 10x      Abdominal bracing with hip adduction (ball between knees)  15x 5 sec      Abdominal bracing with bent knee fall out (unilateral, bilateral)  15x each  Orange band     Abdominal bracing with marches  15x             Bridges HOLD  10x 2sec   Noted slight increase in discomfort at her lower back   Abdominal bracing with SLR 10x   Denied pain when performing          SKTC with towel right/left 3x 15 sec X    Hamstring stretch with belt 3x 15 sec X    Double knee to chest  3x 15 sec X    Piriformis stretch with towel: LLE/RLE  3x 15 sec X           Bilateral heel raises, marching, hamstring curls 15x  X Light touch to //bars   Partial squats  10x  X    3-way hip 10x B  X Cues for abdominal brace                   Specific Interventions Next Treatment: Therapeutic pool sessions and reassessment in 4 weeks. Concentrate on core control/strengthening, Positioning to avoid would be extension past neutral. Responds well with flexion program, posture awareness and work on thoracic posture with extension. Exercises pain management, sensitivity to bottom of left foot due to history of foot surgeries. To wear water shoes. Land based: hp with interferential stimulation, Trial 90/90 position or hooklying with knees bent feet flat on bed. Core exercises for strength, BLE hip strengthening. Progress in seated stabilization to standing. Soft tissue mobilization/massage. Activity/Treatment Tolerance:  [x]  Patient tolerated treatment well  []  Patient limited by fatigue  []  Patient limited by pain   []  Patient limited by medical complications  []  Other:     Assessment:  Reassessment today. Refer to goal summary for status. Patient has completed land based and therap pool based exercise. Patient has had decreased pain levels and noting increased core and hip strength. Her activity she continues to slow and careful prolonged walking, sitting and standing. Patient has been able to walk further. Noting improve lumbar and trunk ROM with fingertips to mid shin level. Improved sitting and standing posture, alignment. Core strength /hip strength improved as well with good progression with HEP. Will discharge today from PT with continuation of HEP. Follow up with Dr. Amy Eddy for injections in March. Body Structures/Functions/Activity Limitations: impaired activity tolerance, impaired ROM, impaired strength, pain, abnormal gait and abnormal posture  Prognosis: good      Goals    Patient Goal: To have less pain improve functional mobility with standing, walking, sitting tolerance. GOAL ONGOING PROGRESS She still is limited with full functional activity level with walking and standing. She is slow and careful. She is able to walk a little further. Short Term Goals: 4 weeks  1. Patient to report of decreased pain from 6-7/10 to 3/10 at left lateral lumbar, lumbosacral, and SI region with improved tolerance of standing, walking. GOAL MET Average pain level 3/10 at lateral lumbar and lumbosacral region. Walking further. 2. Patient to demonstrate improved trunk mobility with forward flexion without catching with fingertips from floor and mid patellar region to mid shin/tibia level. GOAL MET note forward flexion to mid tibia now fingertips from floor. 4. Patient to demonstrate proper body mechanics to decrease stress at lumbar spine and thoracic spine.   GOAL MET Patient aware of limiting flexion at spine for lifting and to bend at knees. Logroll method for bed mobility and sit<>stand using hinge technique      Long Term Goals: 8 weeks  1. Modified Oswestry from 58%to 30%. GOAL NOT MET Oswestry improved to 54%     2. Patient to demonstrate independence in HEP with progression to land based PT program as therap pool program progressed. Continue therap pool at local aquatic facility. IGOAL MET independent with HEP. Patient Education:   []  HEP/Education Completed:  Monitor response to land based treatment session. 90/90 decompression position for pain management   Withings Access Code: [email or web address]  []  No new Education completed  [x]  Reviewed Prior HEP      [x]  Patient verbalized and/or demonstrated understanding of education provided. []  Patient unable to verbalize and/or demonstrate understanding of education provided. Will continue education. []  Barriers to learning:     PLAN:  Treatment Recommendations: Strengthening, Range of Motion, Functional Mobility Training, Transfer Training, Neuromuscular Re-education, Manual Therapy - Soft Tissue Mobilization, Pain Management, Home Exercise Program, Patient Education, Vestibular Rehabilitation, Aquatics and Modalities    []  Plan of care initiated. Plan to see patient 2 times per week for 8 weeks to address the treatment planned outlined above.   [x]  Continue with current plan of care  []  Modify plan of care as follows:    []  Hold pending physician visit  []  Discharge    Time In 0945   Time Out 1024   Timed Code Minutes: 39   Total Treatment Time: 39     Electronically Signed by: Michael Hinson PT

## 2022-03-07 ENCOUNTER — TELEPHONE (OUTPATIENT)
Dept: PHYSICAL MEDICINE AND REHAB | Age: 47
End: 2022-03-07

## 2022-03-07 NOTE — TELEPHONE ENCOUNTER
LVM for pt. regarding procedure provider change to Dr. Ian Galarza due to original provider out of office. Date/time remains the same. Advised to return call to verify receiving message.

## 2022-03-08 DIAGNOSIS — M46.1 SI (SACROILIAC) JOINT INFLAMMATION (HCC): Primary | ICD-10-CM

## 2022-03-10 ENCOUNTER — PREP FOR PROCEDURE (OUTPATIENT)
Dept: PHYSICAL MEDICINE AND REHAB | Age: 47
End: 2022-03-10

## 2022-03-15 ENCOUNTER — ANESTHESIA EVENT (OUTPATIENT)
Dept: OPERATING ROOM | Age: 47
End: 2022-03-15
Payer: COMMERCIAL

## 2022-03-15 ENCOUNTER — HOSPITAL ENCOUNTER (OUTPATIENT)
Age: 47
Setting detail: OUTPATIENT SURGERY
Discharge: HOME | End: 2022-03-15
Attending: STUDENT IN AN ORGANIZED HEALTH CARE EDUCATION/TRAINING PROGRAM | Admitting: STUDENT IN AN ORGANIZED HEALTH CARE EDUCATION/TRAINING PROGRAM
Payer: COMMERCIAL

## 2022-03-15 ENCOUNTER — ANESTHESIA (OUTPATIENT)
Dept: OPERATING ROOM | Age: 47
End: 2022-03-15
Payer: COMMERCIAL

## 2022-03-15 ENCOUNTER — APPOINTMENT (OUTPATIENT)
Dept: GENERAL RADIOLOGY | Age: 47
End: 2022-03-15
Attending: STUDENT IN AN ORGANIZED HEALTH CARE EDUCATION/TRAINING PROGRAM
Payer: COMMERCIAL

## 2022-03-15 VITALS
TEMPERATURE: 96.8 F | SYSTOLIC BLOOD PRESSURE: 139 MMHG | RESPIRATION RATE: 14 BRPM | OXYGEN SATURATION: 98 % | DIASTOLIC BLOOD PRESSURE: 108 MMHG

## 2022-03-15 VITALS
WEIGHT: 199 LBS | OXYGEN SATURATION: 96 % | TEMPERATURE: 97.1 F | BODY MASS INDEX: 31.98 KG/M2 | HEART RATE: 65 BPM | SYSTOLIC BLOOD PRESSURE: 142 MMHG | DIASTOLIC BLOOD PRESSURE: 85 MMHG | RESPIRATION RATE: 12 BRPM | HEIGHT: 66 IN

## 2022-03-15 LAB — GLUCOSE BLD-MCNC: 157 MG/DL (ref 70–108)

## 2022-03-15 PROCEDURE — 3600000054 HC PAIN LEVEL 3 BASE: Performed by: STUDENT IN AN ORGANIZED HEALTH CARE EDUCATION/TRAINING PROGRAM

## 2022-03-15 PROCEDURE — 7100000010 HC PHASE II RECOVERY - FIRST 15 MIN: Performed by: STUDENT IN AN ORGANIZED HEALTH CARE EDUCATION/TRAINING PROGRAM

## 2022-03-15 PROCEDURE — 82948 REAGENT STRIP/BLOOD GLUCOSE: CPT

## 2022-03-15 PROCEDURE — 2709999900 HC NON-CHARGEABLE SUPPLY: Performed by: STUDENT IN AN ORGANIZED HEALTH CARE EDUCATION/TRAINING PROGRAM

## 2022-03-15 PROCEDURE — 3209999900 FLUORO FOR SURGICAL PROCEDURES

## 2022-03-15 PROCEDURE — 6360000002 HC RX W HCPCS: Performed by: STUDENT IN AN ORGANIZED HEALTH CARE EDUCATION/TRAINING PROGRAM

## 2022-03-15 PROCEDURE — 6360000004 HC RX CONTRAST MEDICATION: Performed by: STUDENT IN AN ORGANIZED HEALTH CARE EDUCATION/TRAINING PROGRAM

## 2022-03-15 PROCEDURE — 2500000003 HC RX 250 WO HCPCS: Performed by: STUDENT IN AN ORGANIZED HEALTH CARE EDUCATION/TRAINING PROGRAM

## 2022-03-15 PROCEDURE — 7100000011 HC PHASE II RECOVERY - ADDTL 15 MIN: Performed by: STUDENT IN AN ORGANIZED HEALTH CARE EDUCATION/TRAINING PROGRAM

## 2022-03-15 PROCEDURE — 27096 INJECT SACROILIAC JOINT: CPT | Performed by: STUDENT IN AN ORGANIZED HEALTH CARE EDUCATION/TRAINING PROGRAM

## 2022-03-15 PROCEDURE — 6360000002 HC RX W HCPCS: Performed by: NURSE ANESTHETIST, CERTIFIED REGISTERED

## 2022-03-15 PROCEDURE — 3700000000 HC ANESTHESIA ATTENDED CARE: Performed by: STUDENT IN AN ORGANIZED HEALTH CARE EDUCATION/TRAINING PROGRAM

## 2022-03-15 RX ORDER — LIDOCAINE HYDROCHLORIDE 10 MG/ML
INJECTION, SOLUTION INFILTRATION; PERINEURAL PRN
Status: DISCONTINUED | OUTPATIENT
Start: 2022-03-15 | End: 2022-03-15 | Stop reason: ALTCHOICE

## 2022-03-15 RX ORDER — TRIAMCINOLONE ACETONIDE 40 MG/ML
INJECTION, SUSPENSION INTRA-ARTICULAR; INTRAMUSCULAR PRN
Status: DISCONTINUED | OUTPATIENT
Start: 2022-03-15 | End: 2022-03-15 | Stop reason: ALTCHOICE

## 2022-03-15 RX ADMIN — PROPOFOL 60 MG: 10 INJECTION, EMULSION INTRAVENOUS at 09:32

## 2022-03-15 ASSESSMENT — PULMONARY FUNCTION TESTS
PIF_VALUE: 0

## 2022-03-15 ASSESSMENT — ENCOUNTER SYMPTOMS
PHOTOPHOBIA: 0
SORE THROAT: 0
EYE PAIN: 0
DIARRHEA: 0
COUGH: 0
RHINORRHEA: 0
SHORTNESS OF BREATH: 0
CONSTIPATION: 0
COLOR CHANGE: 0
BACK PAIN: 1
CHEST TIGHTNESS: 0
VOMITING: 0
ABDOMINAL PAIN: 0
SINUS PRESSURE: 0
NAUSEA: 0
WHEEZING: 0

## 2022-03-15 ASSESSMENT — PAIN SCALES - GENERAL: PAINLEVEL_OUTOF10: 0

## 2022-03-15 ASSESSMENT — PAIN - FUNCTIONAL ASSESSMENT: PAIN_FUNCTIONAL_ASSESSMENT: 0-10

## 2022-03-15 NOTE — PROCEDURES
PROCEDURE PERFORMED: Left sacroiliac joint injection    PREOPERATIVE DIAGNOSIS: Lumbago and sacroiliac joint pain    INDICATIONS: Chronic low back pain    The patient's history and physical exam were reviewed. The risk, benefits, and alternatives of the procedure were discussed and all questions were answered to the patient's satisfaction. The patient agreed to proceed and written informed consent was obtained. POSTOPERATIVE DIAGNOSIS: Same    PHYSICIAN:  Dr. Ian Galarza DO    ANESTHESIA:  LOCAL and MAC anesthesia    ASSISTANT:  NONE    PATHOLOGY:  NONE    ESTIMATED BLOOD LOSS:  N/A    IMPLANTS:  NONE    PROCEDURE DESCRIPTION: Left sacroiliac joint injection using fluoroscopy    The patient was placed on the operative bed in prone position. The area was prepped with  Chlorhexidine. The area was then draped in a sterile fashion. The targeted side of the sacroiliac joint was identified and marked under AP fluoroscopy. The fluoroscopic beam was then obliqued until the anterior and posterior margins of the joint were aligned. The inferior margin of the joint was identified. A 25-gauge 3-1/2 inch Quincke needle was directed toward the identified point under fluoroscopic guidance. The joint space was then entered and negative aspiration was confirmed. Then, Omnipaque was injected. An appropriate arthrogram was noted. Then, after negative aspiration, the injectate was easily injected. The injectate consisted of 40 mg triamcinolone and 1 mL lidocaine 1%. The needle was then removed. The needle site was dressed appropriately. The patient was transferred to the postoperative care unit in stable condition. Written discharge instructions were given to the patient. COMPLICATIONS:  There were no apparent complications. The patient tolerated the procedure well.

## 2022-03-15 NOTE — PROGRESS NOTES
Resting quietly in bed with call light in reach. BP elevated. Denies chest pain or SOB-ok to continue with procedure per Dr. Fabio Yee. Reviewed discharge instructions with patient who voiced understanding.

## 2022-03-15 NOTE — ANESTHESIA POSTPROCEDURE EVALUATION
Department of Anesthesiology  Postprocedure Note    Patient: Caden Agosto  MRN: [id number]  YOB: 1975  Date of evaluation: 3/15/2022  Time:  9:45 AM     Procedure Summary     Date: 03/15/22 Room / Location: [address] [place] [address] 03 / [address] [facility]    Anesthesia Start: 9767 Anesthesia Stop: 3982    Procedure: left Si injection #1 (Left ) Diagnosis: (SI (sacroiliac) joint inflammation)    Surgeons: Monique Cruz DO Responsible Provider: Khari Martinez DO    Anesthesia Type: MAC ASA Status: 3          Anesthesia Type: MAC    Jorge A Phase I:      Jorge A Phase II: Jorge A Score: 10    Last vitals: Reviewed and per EMR flowsheets.        Anesthesia Post Evaluation    Patient location during evaluation: bedside  Patient participation: complete - patient participated  Level of consciousness: awake and alert  Pain score: 0  Airway patency: patent  Nausea & Vomiting: no nausea and no vomiting  Complications: no  Cardiovascular status: hemodynamically stable and blood pressure returned to baseline  Respiratory status: spontaneous ventilation, room air and acceptable  Hydration status: stable
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
## 2022-03-15 NOTE — ANESTHESIA PRE PROCEDURE
Department of Anesthesiology  Preprocedure Note       Name:  Shalonda Green   Age:  48 y.o.  :  1975                                          MRN:  [id number]         Date:  3/15/2022      Surgeon: Malcolm Gates):  Melba Ruano DO    Procedure: Procedure(s):  left Si injection #1    Medications prior to admission:   Prior to Admission medications    Medication Sig Start Date End Date Taking? Authorizing Provider   cyclobenzaprine (FLEXERIL) 5 MG tablet Take 5 mg by mouth as needed for Muscle spasms    Historical Provider, MD   omeprazole (PRILOSEC) 40 MG delayed release capsule Take 40 mg by mouth daily    Historical Provider, MD   gabapentin (NEURONTIN) 300 MG capsule Take 1 capsule by mouth nightly for 30 days. 21  SALVATORE Pérez CNP   tamsulosin (FLOMAX) 0.4 MG capsule Take 1 capsule by mouth daily for 30 doses 20  SALVATORE Petty CNP   acetaminophen (TYLENOL) 325 MG tablet Take 650 mg by mouth every 6 hours as needed for Pain    Historical Provider, MD   amLODIPine (NORVASC) 10 MG tablet Take 10 mg by mouth daily    Historical Provider, MD   metFORMIN (GLUCOPHAGE) 500 MG tablet Take 500 mg by mouth daily (with breakfast)    Historical Provider, MD   glipiZIDE (GLUCOTROL) 5 MG tablet Take 5 mg by mouth daily    Historical Provider, MD   hydrochlorothiazide (HYDRODIURIL) 12.5 MG tablet Take 12.5 mg by mouth Twice a Week Indications: Dr Blackburn Chain Provider, MD   metoprolol succinate ER (TOPROL XL) 25 MG XL tablet Take 1 tablet by mouth daily  Patient taking differently: Take 25 mg by mouth 2 times daily  16   [facility] APRKARI - CNP   lisinopril (PRINIVIL;ZESTRIL) 20 MG tablet Take 1 tablet by mouth 2 times daily 16   [facility]   aspirin 81 MG tablet Take 1 tablet by mouth daily 6/24/15   Ladarius Alatorre MD       Current medications:    No current facility-administered medications for this encounter.        Allergies: Allergies   Allergen Reactions    Peach [Prunus Persica] Itching and Swelling    Cinnamon Other (See Comments)     Migraine. Lip Swelling. Throat Itching.      Codeine Hives and Itching    Lamictal [Lamotrigine] Itching    Phenergan [Promethazine Hcl] Other (See Comments)     Gets very aggressive     Tramadol Itching and Other (See Comments)     Gets aggressive         Problem List:    Patient Active Problem List   Diagnosis Code    Breast pain in female N64.4    Asthma J45.909    HTN (hypertension) I10    Bipolar 1 disorder ([address] F31.9    Arthritis M19.90    Chest pain, atypical R07.89    Pure hypercholesterolemia E78.00    GERD (gastroesophageal reflux disease) K21.9    History of chest pain resolved after protonix Z87.898       Past Medical History:        Diagnosis Date    Allergic rhinitis     Allergy     Anxiety     Arthritis     Asthma     Bipolar disorder ([address]     Bronchitis 2006    Cancer ([address] 1992    cervical    Depression     Diabetes mellitus ([address]     History of bronchitis     History of kidney stones     Hyperlipidemia     Hypertension     Ligament tear     left foot    Osteoarthritis     Osteoporosis     PONV (postoperative nausea and vomiting)     Psoriasis     Seizure ([address]        Past Surgical History:        Procedure Laterality Date    APPENDECTOMY  1983    BONY PELVIS SURGERY  2011    Screws removed-[facility]    BREAST BIOPSY  08/06/2012    Bilateral Breast biopsies x2    BREAST LUMPECTOMY  10/25/2010    Dr. Constantin Kenny of left breast mass    CARPAL TUNNEL RELEASE Left 2012     [facility]    COLONOSCOPY      EGD  2012    Dr Adia Collins SINONASAL CAVITY  3/30/11    FEMUR SURGERY  july 14th 2011    rt femur rods removed-[facility]    FOOT SURGERY Left 02/22/2017    tarsal tunnel decompression, neural lysis posterior tibial nerve, wrapping of nerve with amniotic membrane, ligation of varicosities, neurectomy medial calcaneal nerve branch    FOOT SURGERY Left 01/17/2018    Medial Displacement Calcaneal Osteotomy Left Foot, Plantar Fasciotomy with Topaz Left Foot     FOOT SURGERY Left 2/5/2020    EXCISION OF MEDICAL CALCALNEAL NERVE BRANCH  LEFT, REMOVAL OF SCREW FROM POSTERIOR ASPECT OF HEEL LEFT performed by Pramod Toribio DPM at [address] [facility]  2007    Dr. Beather Closs ARTHROSCOPY  2015   [address]    rt knee-arthoscopy     OSTEOTOMY Left 1/17/2018    MEDIAL DISPLACEMENT CALCANEAL OSTEOTOMY, PLANTAR FASCIOTOMY WITH TOPAZ, GASTROCNEMIS LENGTHING  ALL LEFT FOOT performed by Pramod Toribio DPM at [facility]  july 2011    had rods and screws removed from leg and hip-[facility]    SINUS SURGERY  3/16/11    septoplasty    TUBAL LIGATION  2000    UPPER GASTROINTESTINAL ENDOSCOPY      2013       Social History:    Social History     Tobacco Use    Smoking status: Never Smoker    Smokeless tobacco: Never Used   Substance Use Topics    Alcohol use: No                                Counseling given: Not Answered      Vital Signs (Current): There were no vitals filed for this visit.                                            BP Readings from Last 3 Encounters:   02/17/22 136/86   02/16/22 126/82   01/11/22 (!) 141/88       NPO Status:                                                                                 BMI:   Wt Readings from Last 3 Encounters:   02/17/22 197 lb (89.4 kg)   02/16/22 200 lb (90.7 kg)   01/11/22 200 lb (90.7 kg)     There is no height or weight on file to calculate BMI.    CBC:   Lab Results   Component Value Date    WBC 6.5 02/02/2021    RBC 5.42 02/02/2021    RBC 5.07 05/31/2012    HGB 13.9 02/02/2021    HCT 47.0 02/02/2021    MCV 86.7 02/02/2021    RDW 13.6 02/02/2021     02/02/2021       CMP:   Lab Results   Component Value Date     02/02/2021    K 4.1 02/02/2021     02/02/2021    CO2 21 02/02/2021    BUN 11 02/02/2021    CREATININE 0.73 02/02/2021    GFRAA >60 02/02/2021    LABGLOM >60 02/02/2021    LABGLOM 77 06/27/2020    GLUCOSE 151 02/02/2021    GLUCOSE 116 05/31/2012    PROT 6.9 06/27/2020    CALCIUM 9.3 02/02/2021    BILITOT 0.5 06/27/2020    ALKPHOS 66 06/27/2020    AST 23 06/27/2020    ALT 25 06/27/2020       POC Tests: No results for input(s): POCGLU, POCNA, POCK, POCCL, POCBUN, POCHEMO, POCHCT in the last 72 hours. Coags:   Lab Results   Component Value Date    PROTIME 10.1 01/24/2020    INR 0.9 01/24/2020       HCG (If Applicable):   Lab Results   Component Value Date    PREGSERUM NEGATIVE 06/27/2020        ABGs: No results found for: PHART, PO2ART, UVR3EZK, YQQ6ETM, BEART, B2LVQREX     Type & Screen (If Applicable):  No results found for: LABABO, LABRH    Drug/Infectious Status (If Applicable):  Lab Results   Component Value Date    HEPCAB Negative 06/30/2015       COVID-19 Screening (If Applicable): No results found for: COVID19        Anesthesia Evaluation  Patient summary reviewed and Nursing notes reviewed   history of anesthetic complications: PONV. Airway: Mallampati: III  TM distance: >3 FB   Neck ROM: full  Mouth opening: > = 3 FB Dental:          Pulmonary:normal exam  breath sounds clear to auscultation  (+) asthma:                            Cardiovascular:  Exercise tolerance: good (>4 METS),   (+) hypertension:,                   Neuro/Psych:   (+) psychiatric history:            GI/Hepatic/Renal:   (+) GERD:,           Endo/Other:    (+) DiabetesType II DM, , .          Pt had no PAT visit       Abdominal:             Vascular: negative vascular ROS. Other Findings:             Anesthesia Plan      MAC     ASA 3       Induction: intravenous. Anesthetic plan and risks discussed with patient. Plan discussed with CRNA.                   Evelyn Dean DO   3/15/2022

## 2022-03-15 NOTE — PROGRESS NOTES
2394-  Patient arrived to phase II via cart. Spontaneous respiraitons even and unlabored. Placed on monitor--VSS. Report received from [facility].   3345-  Assessment completed. Patient is alert and oriented x4. IV capped off-- no complications. Patient denies pain--will monitor. Injection sites clean and dry. 56-  Drink given to patient. Family in room. 9691-  All belongings in room. 1780-  IV removed-- no complications. Bandage applied. 1000-  Patient dressing. 1010-  Patient discharged in stable condition with all belongings. This RN walked patient to car.

## 2022-03-22 ENCOUNTER — TELEPHONE (OUTPATIENT)
Dept: PHYSICAL MEDICINE AND REHAB | Age: 47
End: 2022-03-22

## 2022-03-22 NOTE — TELEPHONE ENCOUNTER
Pt called in SI injection #1 was done on 3/15/22. Starting having pain 3 days after the procedure. . She is having a little relieved  with Aleve and is taking flexeril. Has appt on 4/11/22. Advised pt to call back if pain got worst and no relief with over counter medication and the procedure was short term .

## 2022-04-28 ENCOUNTER — HOSPITAL ENCOUNTER (OUTPATIENT)
Dept: OCCUPATIONAL THERAPY | Age: 47
Setting detail: THERAPIES SERIES
Discharge: HOME | End: 2022-04-28
Payer: COMMERCIAL

## 2022-04-28 PROCEDURE — 97165 OT EVAL LOW COMPLEX 30 MIN: CPT

## 2022-04-28 PROCEDURE — 97110 THERAPEUTIC EXERCISES: CPT

## 2022-04-28 NOTE — PROGRESS NOTES
** PLEASE SIGN, DATE AND TIME CERTIFICATION BELOW AND RETURN TO [facility] OUTPATIENT REHABILITATION (FAX #: [phone]). ATTEST/CO-SIGN IF ACCESSING VIA INKBLE. THANK YOU.**    I certify that I have examined the patient below and determined that Physical Medicine and Rehabilitation service is necessary and that I approve the established plan of care for up to 90 days or as specifically noted.   Attestation, signature or co-signature of physician indicates approval of certification requirements.    ________________________ ____________ __________  Physician Signature   Date   Time   [address] THERAPY  [x] EVALUATION  [] DAILY NOTE (LAND) [] DAILY NOTE (AQUATIC ) [] PROGRESS NOTE [] DISCHARGE NOTE    [x] [facility]   [] [facility] 90    [] [facility]   [] Merlinda Box    Date: 2022  Patient Name:  Sergio Parnell  : 1975  MRN: [id number]  CSN: [id number]    Referring Practitioner Chidi Perales MD   Diagnosis M75.42 (ICD-10-CM) - Impingement syndrome of left shoulder   Treatment Diagnosis Decreased ROM left UE, increased pain    Date of Evaluation 22      Functional Outcome Measure Used UEFS   Functional Outcome Score 31/80 (22)       Insurance: Primary: Payor: UMR /  /  / ,   Secondary:    Authorization Information: PRE CERTIFICATION REQUIRED: NO  INSURANCE THERAPY BENEFIT:  ALLOWED 60 VISITS PT/OT/ST COMBINED PER CALENDAR YEAR, NONE OF THESE VISITS HAVE BEEN USED  AQUATIC THERAPY COVERED: YES  MODALITIES COVERED:  YES, YES MASSAGE  TELEHEALTH COVERED: NO   Visit # 1, 1/10 for progress note   Visits Allowed:   52     60 combined with OT/PT/ST   13 used with PT   Recertification Date: 6/84/15   Physician Follow-Up: 22   Physician Orders: Impingement protocol, RTC strengthening, posterior capsule stretching, scapular strengthening   Pertinent History: Patient rpeorts that she was putting up curtains, her chair slipped out and her chair slipped out and she hit everything on her left side. Her arm went straight up. Reports that she fell on her left arm on the ice, but the sympotms did not last this long.      has a past medical history of Allergic rhinitis, Allergy, Anxiety, Arthritis, Asthma, Bipolar disorder ([address] Bronchitis, Cancer ([address] Depression, Diabetes mellitus ([address] History of bronchitis, History of kidney stones, Hyperlipidemia, Hypertension, Ligament tear, Osteoarthritis, Osteoporosis, PONV (postoperative nausea and vomiting), Psoriasis, and Seizure ([address] SUBJECTIVE: reports that she has to adjust her shoulder when bringing her arm back to her side after reaching forward. Social/Functional History:  Electronic Medical Record reviewed and up to date    Vy Hussein lives with family. Task Prior Level of Function  (current level of function addressed below)   ADLs  Independent   Ambulation Independent   Transfers Independent   Hobbies Crafting, reading   Driving Active    Work On Disability - for back     OBJECTIVE:  Hand Dominance right handed   Palpation Tenderness on the upper trap, anterior shoulder over the pec minor, min tenderness on the bicep tendon and proximal lateral arm and axillary. Observation Left scapula slightly depressed compared to the right scapula; increased abduction of the left scapula compared to the right during forward elevation   Posture When resting, left arm is closer to the body than the right UE.   Reports that she has scoliosis   ADL's At this time patient reports increased difficulty with dressing, grooming, bathing,    Sensation shooting sensation down the arm into the thumb, reports that the thumb is tight    Coordination denies any changes in coordination; rapid opposition intact           LEFT UPPER EXTREMITY  RANGE OF MOTION    AROM PROM COMMENTS         Shoulder Flexion 120 124    Shoulder Extension      Shoulder Abduction 67 115    Shoulder Adduction      Shoulder External Rotation 46 with the arm at the side 80 in abd    Shoulder Internal Rotation Thumb tip 2.5 inches above the wasitband 62    Shoulder Range of Motion is [facility]  []      Elbow Flexion      Elbow Extension      Forearm Pronation      Forearm Supination      Elbow Range of Motion is [facility] [place]  [x]      Wrist Flexion      Wrist Extension      Wrist Radial Deviation      Wrist Ulnar Deviation      Wrist Range of Motion is [facility]  [x]   If no measurement is recorded, no formal assessment was completed for that motion. LEFT UPPER EXTREMITY  STRENGTH    Strength Rating Comments   Shoulder Flexion 4/5    Shoulder Extension     Shoulder Abduction 3/5    Shoulder Adduction     Shoulder External Rotation 3+/5    Shoulder Internal Rotation 4/5    []  Shoulder Strength is grossly WFL. Elbow Flexion 4/5    Elbow Extension 4/5    Forearm Pronation     Forearm Supination     [] Elbow Strength is grossly WFL. Wrist Flexion NT    Wrist Extension NT    Wrist Radial Deviation     Wrist Ulnar Deviation     []  Wrist Strength is grossly WFL. Right Left    Strength Setting:  NT NT   Pinch Strength Tip Pinch:      Lateral Pinch           If no ratings are recorded, no formal assessment was completed. TREATMENT   Precautions:  Bolster under knees when supine, chronic low back pain, DM, seizure hx   Pain:  5/10 left shoulder     X in shaded column indicates Activity Completed Today   Modalities Parameters/  Location  Notes/Comments                     Manual Therapy Time/  Technique  Notes/Comments                     Exercises   Sets/  Sec Reps  Notes/Comments   Sleeper stretch   x    Table slides in flexion   x                                       Activities Time    Notes/Comments                       Specific Interventions Next Treatment: ROM, posterior capsule stretching, RTC strengthening, scapular strengthening    Activity/Treatment Tolerance:  [x]  Patient tolerated treatment well  []  Patient limited by fatigue  []  Patient limited by pain   []  Patient limited by other medical complications  []  Other:     Assessment: Patient presents to the clinic with pain and decreased ROM/strength in the left UE. These deficits are affecting her performance of her daily care and housekeeping activities. Patient reports difficulty with sleep due to pain. Patient requires skilled therapy to increase motion and strength to return to previous functional levels. Areas for Improvement: impaired ROM, impaired strength and pain  Prognosis: good    GOALS:  Patient Goal: decrease pain, be able to lift her arm, and reach behind her back    Short Term Goals:  Time Frame: 4 weeks  1. Patient will increase left shoulder AROM to 140 degrees flexion, 80 degrees abduction, 60 degrees ER with the arm at the side and thumb tip 4 inches above the waistband to increase ability grooming  2. Patient will increase left shoulder PROM to 140 degrees flexion, 140 degrees abduction, and 80 degrees IR to increase ease and ability for dressing. 3.  Patient will report pain with activity no greater than 3/10 to increase ability to reach into upper cupboards for kitchen items. 4.  Patient will be independent with HEP to increase ease and ability to wash her back. Long Term Goals:  Time Frame: 12 weeks  1. Patient will scratch her back with her left UE without pain and difficulty. 2.  Patient will style her hair with B UE without difficulty or pain. 3.  Patient will fall asleep and sleep through the night without pain. Patient Education:   [x]  HEP/Education Completed: Plan of Care, Goals   sleeper stretch  []  No new Education completed  []  Reviewed Prior HEP      [x]  Patient verbalized and/or demonstrated understanding of education provided. []  Patient unable to verbalize and/or demonstrate understanding of education provided. Will continue education.   [x]  Barriers to learning: none PLAN:  Treatment Recommendations: Strengthening, Range of Motion, Manual Therapy - Soft Tissue Mobilization, Manual Therapy - Joint Manipulation, Home Exercise Program, Patient Education, Integrative Dry Needling and Modalities    [x]  Plan of care initiated. Plan to see patient 2 times per week for 12 weeks to address the treatment planned outlined above.   []  Continue with current plan of care  []  Modify plan of care as follows:    []  Hold pending physician visit  []  Discharge    Time In 0845   Time Out 0945   Timed Code Minutes: 25 min   Total Treatment Time: 60 min       Electronically Signed by:Shagufta Jones, OTR/L [id number]

## 2022-05-02 ENCOUNTER — HOSPITAL ENCOUNTER (OUTPATIENT)
Dept: OCCUPATIONAL THERAPY | Age: 47
Setting detail: THERAPIES SERIES
Discharge: HOME | End: 2022-05-02
Payer: COMMERCIAL

## 2022-05-02 PROCEDURE — 97110 THERAPEUTIC EXERCISES: CPT

## 2022-05-02 PROCEDURE — 97140 MANUAL THERAPY 1/> REGIONS: CPT

## 2022-05-02 NOTE — PROGRESS NOTES
[address]  [] EVALUATION  [x] DAILY NOTE (LAND) [] DAILY NOTE (AQUATIC ) [] PROGRESS NOTE [] DISCHARGE NOTE    [x] OUTPATIENT REHABILITATION CENTER [facility]   [] [place] [address]    [] [facility]   [] Carlota Natarajan    Date: 2022  Patient Name:  José Miguel Nunn  : 1975  MRN: [id number]  CSN: [id number]    Referring Practitioner Spencer Sims MD   Diagnosis M75.42 (ICD-10-CM) - Impingement syndrome of left shoulder   Treatment Diagnosis Decreased ROM left UE, increased pain    Date of Evaluation 22      Functional Outcome Measure Used UEFS   Functional Outcome Score 31 (22)       Insurance: Primary: Payor: R /  /  / ,   Secondary:    Authorization Information: PRE CERTIFICATION REQUIRED: NO  INSURANCE THERAPY BENEFIT:  ALLOWED 60 VISITS PT/OT/ST COMBINED PER CALENDAR YEAR, NONE OF THESE VISITS HAVE BEEN USED  AQUATIC THERAPY COVERED: YES  MODALITIES COVERED:  YES, YES MASSAGE  TELEHEALTH COVERED: NO   Visit # 2, 2/10 for progress note   Visits Allowed:   47     60 combined with OT/PT/ST   13 used with PT   Recertification Date: 6/74/15   Physician Follow-Up: 22   Physician Orders: Impingement protocol, RTC strengthening, posterior capsule stretching, scapular strengthening   Pertinent History: Patient rpeorts that she was putting up curtains, her chair slipped out and her chair slipped out and she hit everything on her left side. Her arm went straight up.   Reports that she fell on her left arm on the ice, but the sympotms did not last this long.      has a past medical history of Allergic rhinitis, Allergy, Anxiety, Arthritis, Asthma, Bipolar disorder ([facility] Bronchitis, Cancer ([facility] Depression, Diabetes mellitus ([facility] History of bronchitis, History of kidney stones, Hyperlipidemia, Hypertension, Ligament tear, Osteoarthritis, Osteoporosis, PONV (postoperative nausea and vomiting), Psoriasis, and Seizure [facility]).    SUBJECTIVE: Patient reports shoulder is not feeling awful, but she has been \"guarding it\". Patient reports night is still the worse pain, 9/10 rating, (+) sleep disturbances and can bring patient to tears. TREATMENT   Precautions: Bolster under knees when supine, chronic low back pain, DM, seizure hx   Pain:  3-4/10 left shoulder     X in shaded column indicates Activity Completed Today   Modalities Parameters/  Location  Notes/Comments                     Manual Therapy Time/  Technique  Notes/Comments   PROM L Shoulder all motions to tolerance   x Good motion noted, min discomfort end range IR and Flexion                Exercises   Sets/  Sec Reps  Notes/Comments   Sleeper stretch 20 sec 3 x    Table slides in flexion 10 sec 10 x    Scapular retraction  1 10 x    Posterior capsule stretch  10 sec 4 x Initiated. Good tolerance and stretch noted. Added to HEP   Supine dowel: flex 90 to tolerance, horizontal abd/add 1 5 ea x Initiated. Good stretch noted, good technique. Added to HEP                 Activities Time    Notes/Comments                       Specific Interventions Next Treatment: ROM, posterior capsule stretching, RTC strengthening, scapular strengthening    Activity/Treatment Tolerance:  [x]  Patient tolerated treatment well  []  Patient limited by fatigue  []  Patient limited by pain   []  Patient limited by other medical complications  []  Other:     Assessment: Patient progressing towards goals. First session since evaluation, improved ROM noted from patient and decreased pain at start of session. Patient reports compliance with HEP, continued with progressions in HEP. Areas for Improvement: impaired ROM, impaired strength and pain  Prognosis: good    GOALS:  Patient Goal: decrease pain, be able to lift her arm, and reach behind her back    Short Term Goals:  Time Frame: 4 weeks  1.   Patient will increase left shoulder AROM to 140 degrees flexion, 80 degrees abduction, 60 degrees ER with the arm at the side and thumb tip 4 inches above the waistband to increase ability grooming  2. Patient will increase left shoulder PROM to 140 degrees flexion, 140 degrees abduction, and 80 degrees IR to increase ease and ability for dressing. 3.  Patient will report pain with activity no greater than 3/10 to increase ability to reach into upper cupboards for kitchen items. 4.  Patient will be independent with HEP to increase ease and ability to wash her back. Long Term Goals:  Time Frame: 12 weeks  1. Patient will scratch her back with her left UE without pain and difficulty. 2.  Patient will style her hair with B UE without difficulty or pain. 3.  Patient will fall asleep and sleep through the night without pain. Patient Education:   []  HEP/Education Completed: Plan of Care, Goals   sleeper stretch   5/2/22: posterior capsule stretch, supine dowel flexion horizontal abd/add  []  No new Education completed  [x]  Reviewed Prior HEP      [x]  Patient verbalized and/or demonstrated understanding of education provided. []  Patient unable to verbalize and/or demonstrate understanding of education provided. Will continue education. [x]  Barriers to learning: none     PLAN:  Treatment Recommendations: Strengthening, Range of Motion, Manual Therapy - Soft Tissue Mobilization, Manual Therapy - Joint Manipulation, Home Exercise Program, Patient Education, Integrative Dry Needling and Modalities    []  Plan of care initiated. Plan to see patient 2 times per week for 12 weeks to address the treatment planned outlined above. [x]  Continue with current plan of care  []  Modify plan of care as follows:    []  Hold pending physician visit  []  Discharge    Time In 1445   Time Out 1515   Timed Code Minutes: 30 min   Total Treatment Time: 30 min       Electronically Signed by:  Gladys MARTINS #[id number]

## 2022-05-03 ENCOUNTER — OFFICE VISIT (OUTPATIENT)
Dept: PHYSICAL MEDICINE AND REHAB | Age: 47
End: 2022-05-03
Payer: COMMERCIAL

## 2022-05-03 ENCOUNTER — TELEPHONE (OUTPATIENT)
Dept: PHYSICAL MEDICINE AND REHAB | Age: 47
End: 2022-05-03

## 2022-05-03 VITALS
HEART RATE: 68 BPM | DIASTOLIC BLOOD PRESSURE: 88 MMHG | WEIGHT: 199 LBS | BODY MASS INDEX: 31.98 KG/M2 | SYSTOLIC BLOOD PRESSURE: 134 MMHG | HEIGHT: 66 IN

## 2022-05-03 DIAGNOSIS — M53.3 SACROILIAC JOINT DYSFUNCTION: ICD-10-CM

## 2022-05-03 DIAGNOSIS — M16.52 POST-TRAUMATIC OSTEOARTHRITIS OF LEFT HIP: ICD-10-CM

## 2022-05-03 DIAGNOSIS — M47.816 SPONDYLOSIS OF LUMBAR REGION WITHOUT MYELOPATHY OR RADICULOPATHY: ICD-10-CM

## 2022-05-03 DIAGNOSIS — M46.1 SI (SACROILIAC) JOINT INFLAMMATION (HCC): Primary | ICD-10-CM

## 2022-05-03 DIAGNOSIS — G62.9 NEUROPATHY: ICD-10-CM

## 2022-05-03 DIAGNOSIS — G89.4 CHRONIC PAIN SYNDROME: ICD-10-CM

## 2022-05-03 DIAGNOSIS — M48.07 LUMBOSACRAL SPINAL STENOSIS: ICD-10-CM

## 2022-05-03 PROCEDURE — 99214 OFFICE O/P EST MOD 30 MIN: CPT | Performed by: NURSE PRACTITIONER

## 2022-05-03 RX ORDER — MELOXICAM 7.5 MG/1
7.5 TABLET ORAL DAILY
COMMUNITY

## 2022-05-03 ASSESSMENT — ENCOUNTER SYMPTOMS
COLOR CHANGE: 0
WHEEZING: 0
SINUS PRESSURE: 0
BACK PAIN: 1
SORE THROAT: 0
CONSTIPATION: 0
COUGH: 0
CHEST TIGHTNESS: 0
DIARRHEA: 0
RHINORRHEA: 0
SHORTNESS OF BREATH: 0
VOMITING: 0
PHOTOPHOBIA: 0
NAUSEA: 0
ABDOMINAL PAIN: 0
EYE PAIN: 0
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
## 2022-05-05 ENCOUNTER — HOSPITAL ENCOUNTER (OUTPATIENT)
Dept: OCCUPATIONAL THERAPY | Age: 47
Setting detail: THERAPIES SERIES
Discharge: HOME | End: 2022-05-05
Payer: COMMERCIAL

## 2022-05-05 PROCEDURE — 97140 MANUAL THERAPY 1/> REGIONS: CPT

## 2022-05-05 PROCEDURE — 97035 APP MDLTY 1+ULTRASOUND EA 15: CPT

## 2022-05-05 PROCEDURE — 97110 THERAPEUTIC EXERCISES: CPT

## 2022-05-05 NOTE — PROGRESS NOTES
[address]  [] EVALUATION  [x] DAILY NOTE (LAND) [] DAILY NOTE (AQUATIC ) [] PROGRESS NOTE [] DISCHARGE NOTE    [x] OUTPATIENT REHABILITATION CENTER [facility]   [] [place] [address]    [] [facility]   [] Julian Kendall    Date: 2022  Patient Name:  Rom Amador  : 1975  MRN: [id number]  CSN: [id number]    Referring Practitioner Juan Crews MD   Diagnosis M75.42 (ICD-10-CM) - Impingement syndrome of left shoulder   Treatment Diagnosis Decreased ROM left UE, increased pain    Date of Evaluation 22      Functional Outcome Measure Used UEFS   Functional Outcome Score 31 (22)       Insurance: Primary: Payor: R /  /  / ,   Secondary:    Authorization Information: PRE CERTIFICATION REQUIRED: NO  INSURANCE THERAPY BENEFIT:  ALLOWED 60 VISITS PT/OT/ST COMBINED PER CALENDAR YEAR, NONE OF THESE VISITS HAVE BEEN USED  AQUATIC THERAPY COVERED: YES  MODALITIES COVERED:  YES, YES MASSAGE  TELEHEALTH COVERED: NO   Visit # 3, 3/10 for progress note   Visits Allowed:   47     60 combined with OT/PT/ST   13 used with PT   Recertification Date:    Physician Follow-Up: 22   Physician Orders: Impingement protocol, RTC strengthening, posterior capsule stretching, scapular strengthening   Pertinent History: Patient rpeorts that she was putting up curtains, her chair slipped out and her chair slipped out and she hit everything on her left side. Her arm went straight up.   Reports that she fell on her left arm on the ice, but the sympotms did not last this long.      has a past medical history of Allergic rhinitis, Allergy, Anxiety, Arthritis, Asthma, Bipolar disorder ([facility] [address] Bronchitis, Cancer ([facility] [address] Depression, Diabetes mellitus ([facility] [address] History of bronchitis, History of kidney stones, Hyperlipidemia, Hypertension, Ligament tear, Osteoarthritis, Osteoporosis, PONV (postoperative nausea and vomiting), Psoriasis, and Seizure [facility]).    SUBJECTIVE: Patient reports shoulder is very sore today - states it was very painful yesterday as well. Points to front side of shoulder for pain. TREATMENT   Precautions: Bolster under knees when supine, chronic low back pain, DM, seizure hx   Pain:  3-4/10 left shoulder     X in shaded column indicates Activity Completed Today   Modalities Parameters/  Location  Notes/Comments   Ultrasound to left anterior shoulder Continuous, 1 MHz, 1 watt/cm2  X Initiated today - trial               Manual Therapy Time/  Technique  Notes/Comments   PROM L Shoulder all motions to tolerance   X Good motion noted, min discomfort end range IR and Flexion    IASTM to anterior shoulder and deltoid  X          Exercises   Sets/  Sec Reps  Notes/Comments   Sleeper stretch 20 sec 3 X Pt. States sleeper stretch hurts worse today - instructed pt. To not push as hard   Table slides in flexion 10 sec 10 X    Scapular retraction  3 sec 10 X    Posterior capsule stretch - cross body 10 sec 4 X    Supine dowel: flex 90 to tolerance with UE in neutral, horizontal abd/add 1 10 ea X                  Activities Time    Notes/Comments   Trial kinesiotape to left shoulder in 2 I strips anterior to posterior for mechanical correction  X Initiated today                 Specific Interventions Next Treatment: ROM, posterior capsule stretching, RTC strengthening, scapular strengthening    Activity/Treatment Tolerance:  [x]  Patient tolerated treatment well  []  Patient limited by fatigue  []  Patient limited by pain   []  Patient limited by other medical complications  []  Other:     Assessment: Patient reporting increased pain in left shoulder today. Initiated ultrasound and kinesiotape to try to decrease pain. Areas for Improvement: impaired ROM, impaired strength and pain  Prognosis: good    GOALS:  Patient Goal: decrease pain, be able to lift her arm, and reach behind her back    Short Term Goals:  Time Frame: 4 weeks  1. Patient will increase left shoulder AROM to 140 degrees flexion, 80 degrees abduction, 60 degrees ER with the arm at the side and thumb tip 4 inches above the waistband to increase ability grooming  2. Patient will increase left shoulder PROM to 140 degrees flexion, 140 degrees abduction, and 80 degrees IR to increase ease and ability for dressing. 3.  Patient will report pain with activity no greater than 3/10 to increase ability to reach into upper cupboards for kitchen items. 4.  Patient will be independent with HEP to increase ease and ability to wash her back. Long Term Goals:  Time Frame: 12 weeks  1. Patient will scratch her back with her left UE without pain and difficulty. 2.  Patient will style her hair with B UE without difficulty or pain. 3.  Patient will fall asleep and sleep through the night without pain. Patient Education:   []  HEP/Education Completed: Plan of Care, Goals   sleeper stretch   5/2/22: posterior capsule stretch, supine dowel flexion horizontal abd/add  []  No new Education completed  [x]  Reviewed Prior HEP, educated on purpose of ultrasound and the purpose and care of kinesiotape      [x]  Patient verbalized and/or demonstrated understanding of education provided. []  Patient unable to verbalize and/or demonstrate understanding of education provided. Will continue education. [x]  Barriers to learning: none     PLAN:  Treatment Recommendations: Strengthening, Range of Motion, Manual Therapy - Soft Tissue Mobilization, Manual Therapy - Joint Manipulation, Home Exercise Program, Patient Education, Integrative Dry Needling and Modalities    []  Plan of care initiated. Plan to see patient 2 times per week for 12 weeks to address the treatment planned outlined above.   [x]  Continue with current plan of care  []  Modify plan of care as follows:    []  Hold pending physician visit  []  Discharge    Time In 1045   Time Out 1125   Timed Code Minutes: 40 min   Total Treatment Time: 40 min       Electronically Signed by:  Torey Bryan, OTR/L [id number]

## 2022-05-09 ENCOUNTER — HOSPITAL ENCOUNTER (OUTPATIENT)
Dept: OCCUPATIONAL THERAPY | Age: 47
Setting detail: THERAPIES SERIES
Discharge: HOME | End: 2022-05-09
Payer: COMMERCIAL

## 2022-05-09 PROCEDURE — 97035 APP MDLTY 1+ULTRASOUND EA 15: CPT

## 2022-05-09 PROCEDURE — 97110 THERAPEUTIC EXERCISES: CPT

## 2022-05-09 NOTE — PROGRESS NOTES
[address]  [] EVALUATION  [x] DAILY NOTE (LAND) [] DAILY NOTE (AQUATIC ) [] PROGRESS NOTE [] DISCHARGE NOTE    [x] OUTPATIENT REHABILITATION CENTER [facility]   [] [place] [address]    [] [facility]   [] Karen Amabil    Date: 2022  Patient Name:  Alina Vargas  : 1975  MRN: [id number]  CSN: [id number]    Referring Practitioner Moses Lawrence MD   Diagnosis M75.42 (ICD-10-CM) - Impingement syndrome of left shoulder   Treatment Diagnosis Decreased ROM left UE, increased pain    Date of Evaluation 22      Functional Outcome Measure Used UEFS   Functional Outcome Score  (22)       Insurance: Primary: Payor: R /  /  / ,   Secondary:    Authorization Information: PRE CERTIFICATION REQUIRED: NO  INSURANCE THERAPY BENEFIT:  ALLOWED 60 VISITS PT/OT/ST COMBINED PER CALENDAR YEAR, NONE OF THESE VISITS HAVE BEEN USED  AQUATIC THERAPY COVERED: YES  MODALITIES COVERED:  YES, YES MASSAGE  TELEHEALTH COVERED: NO   Visit # 3, 3/10 for progress note   Visits Allowed:   47     60 combined with OT/PT/ST   13 used with PT   Recertification Date: 5/07/15   Physician Follow-Up: 22   Physician Orders: Impingement protocol, RTC strengthening, posterior capsule stretching, scapular strengthening   Pertinent History: Patient rpeorts that she was putting up curtains, her chair slipped out and her chair slipped out and she hit everything on her left side. Her arm went straight up.   Reports that she fell on her left arm on the ice, but the sympotms did not last this long.      has a past medical history of Allergic rhinitis, Allergy, Anxiety, Arthritis, Asthma, Bipolar disorder ([facility] [address] Bronchitis, Cancer ([facility] [address] Depression, Diabetes mellitus ([facility] [address] History of bronchitis, History of kidney stones, Hyperlipidemia, Hypertension, Ligament tear, Osteoarthritis, Osteoporosis, PONV (postoperative nausea and vomiting), Psoriasis, and Seizure [facility]).    SUBJECTIVE: Patient reports shoulder is feeling better. Liked the [address] and kinesiotape. Tried to help with a little light outdoor gardening this weekend. TREATMENT   Precautions: Bolster under knees when supine, chronic low back pain, DM, seizure hx   Pain:  3-4/10 left shoulder     X in shaded column indicates Activity Completed Today   Modalities Parameters/  Location  Notes/Comments   Ultrasound to left anterior shoulder Continuous, 1 MHz, 1 watt/cm2  X Good tolerance                Manual Therapy Time/  Technique  Notes/Comments   PROM L Shoulder all motions to tolerance   X Good motion noted, min discomfort end range IR and Flexion    IASTM to anterior shoulder and deltoid  X          Exercises   Sets/  Sec Reps  Notes/Comments   Sleeper stretch 20 sec 3 X Good tolerance this date. Remain tight    Table slides in flexion 10 sec 10     Scapular retraction , sh depression 3 sec 10 X    Posterior capsule stretch - cross body 10 sec 4 X    Supine dowel: flex 90 to tolerance with UE in neutral, horizontal abd/add,  1 10 ea X    Upper trap stretch 1 5 x    AROM ER at side- supine  1 10 x trialed in sidelying, catching feeling. Supine sh circles. CW, CCW, serratus punch 1 10 x intiatiated this session                         Activities Time    Notes/Comments   Trial kinesiotape to left shoulder upper trap inhibition, deltoid inhibition, anterior sh positioning  X trialed this method due to min skin irritation anterior shoulder                 Specific Interventions Next Treatment: ROM, posterior capsule stretching, RTC strengthening, scapular strengthening    Activity/Treatment Tolerance:  [x]  Patient tolerated treatment well  []  Patient limited by fatigue  []  Patient limited by pain   []  Patient limited by other medical complications  []  Other:     Assessment: Patient reporting less pain this date . Tolerating stretches, achieving near full ROM with minimal catching feelings.  Initiated periscapular shoulder circles and serratus punch this date with good tolerance. Tolerating ultrasound and kinesiotape to try to decrease pain. Areas for Improvement: impaired ROM, impaired strength and pain  Prognosis: good    GOALS:  Patient Goal: decrease pain, be able to lift her arm, and reach behind her back    Short Term Goals:  Time Frame: 4 weeks  1. Patient will increase left shoulder AROM to 140 degrees flexion, 80 degrees abduction, 60 degrees ER with the arm at the side and thumb tip 4 inches above the waistband to increase ability grooming  2. Patient will increase left shoulder PROM to 140 degrees flexion, 140 degrees abduction, and 80 degrees IR to increase ease and ability for dressing. 3.  Patient will report pain with activity no greater than 3/10 to increase ability to reach into upper cupboards for kitchen items. 4.  Patient will be independent with HEP to increase ease and ability to wash her back. Long Term Goals:  Time Frame: 12 weeks  1. Patient will scratch her back with her left UE without pain and difficulty. 2.  Patient will style her hair with B UE without difficulty or pain. 3.  Patient will fall asleep and sleep through the night without pain. Patient Education:   []  HEP/Education Completed: Plan of Care, Goals   sleeper stretch   5/2/22: posterior capsule stretch, supine dowel flexion horizontal abd/add  []  No new Education completed  [x]  Reviewed Prior HEP, educated on purpose of ultrasound and the purpose and care of kinesiotape      [x]  Patient verbalized and/or demonstrated understanding of education provided. []  Patient unable to verbalize and/or demonstrate understanding of education provided. Will continue education.   [x]  Barriers to learning: none     PLAN:  Treatment Recommendations: Strengthening, Range of Motion, Manual Therapy - Soft Tissue Mobilization, Manual Therapy - Joint Manipulation, Home Exercise Program, Patient Education, Integrative Dry Needling and Modalities    []  Plan of care initiated. Plan to see patient 2 times per week for 12 weeks to address the treatment planned outlined above.   [x]  Continue with current plan of care  []  Modify plan of care as follows:    []  Hold pending physician visit  []  Discharge    Time In 1500   Time Out 1545   Timed Code Minutes: 45 min   Total Treatment Time: 45 min       Electronically Signed by:  NICKOLAS Taylor OTR/L [id number]

## 2022-05-12 ENCOUNTER — HOSPITAL ENCOUNTER (OUTPATIENT)
Dept: OCCUPATIONAL THERAPY | Age: 47
Setting detail: THERAPIES SERIES
Discharge: HOME | End: 2022-05-12
Payer: COMMERCIAL

## 2022-05-12 PROCEDURE — 97110 THERAPEUTIC EXERCISES: CPT

## 2022-05-12 PROCEDURE — 97035 APP MDLTY 1+ULTRASOUND EA 15: CPT

## 2022-05-12 PROCEDURE — 97140 MANUAL THERAPY 1/> REGIONS: CPT

## 2022-05-12 NOTE — PROGRESS NOTES
[address]  [] EVALUATION  [x] DAILY NOTE (LAND) [] DAILY NOTE (AQUATIC ) [] PROGRESS NOTE [] DISCHARGE NOTE    [x] OUTPATIENT REHABILITATION CENTER [facility]   [] [place] [address]    [] [facility]   [] Gabi Gonzalez    Date: 2022  Patient Name:  Sobeida Tubbs  : 1975  MRN: [id number]  CSN: [id number]    Referring Practitioner Kayden Lezama MD   Diagnosis M75.42 (ICD-10-CM) - Impingement syndrome of left shoulder   Treatment Diagnosis Decreased ROM left UE, increased pain    Date of Evaluation 22      Functional Outcome Measure Used UEFS   Functional Outcome Score  (22)       Insurance: Primary: Payor: R /  /  / ,   Secondary:    Authorization Information: PRE CERTIFICATION REQUIRED: NO  INSURANCE THERAPY BENEFIT:  ALLOWED 60 VISITS PT/OT/ST COMBINED PER CALENDAR YEAR, NONE OF THESE VISITS HAVE BEEN USED  AQUATIC THERAPY COVERED: YES  MODALITIES COVERED:  YES, YES MASSAGE  TELEHEALTH COVERED: NO   Visit # 4, 4/10 for progress note   Visits Allowed:   47     60 combined with OT/PT/ST   13 used with PT   Recertification Date:    Physician Follow-Up: 22   Physician Orders: Impingement protocol, RTC strengthening, posterior capsule stretching, scapular strengthening   Pertinent History: Patient rpeorts that she was putting up curtains, her chair slipped out and her chair slipped out and she hit everything on her left side. Her arm went straight up.   Reports that she fell on her left arm on the ice, but the sympotms did not last this long.      has a past medical history of Allergic rhinitis, Allergy, Anxiety, Arthritis, Asthma, Bipolar disorder ([facility] [address] Bronchitis, Cancer ([facility] [address] Depression, Diabetes mellitus ([facility] [address] History of bronchitis, History of kidney stones, Hyperlipidemia, Hypertension, Ligament tear, Osteoarthritis, Osteoporosis, PONV (postoperative nausea and vomiting), Psoriasis, and Seizure [facility]).    SUBJECTIVE: Patient reports she is very sore, with reference she woke up this morning really hurting. Referencing upper trap, anterior shoulder, and down into her elbow. Patient wonder if she slept on it wrong. Patient to have MRI tomorrow, and MD appt on Wednesday. TREATMENT   Precautions: Bolster under knees when supine, chronic low back pain, DM, seizure hx   Pain:  5/10 left shoulder     X in shaded column indicates Activity Completed Today   Modalities Parameters/  Location  Notes/Comments   Ultrasound to left anterior shoulder Continuous, 1 MHz, 1 watt/cm2  X Good tolerance                Manual Therapy Time/  Technique  Notes/Comments   PROM L Shoulder all motions to tolerance   X Good motion noted, min discomfort end range ER and Flexion    IASTM to anterior shoulder and deltoid  X          Exercises   Sets/  Sec Reps  Notes/Comments   Sleeper stretch 20 sec 3  Good tolerance this date. Remain tight    Table slides in flexion 10 sec 10     Scapular retraction , sh depression 3 sec 10 X    Posterior capsule stretch - cross body 10 sec 4 X    Supine dowel: flex 90 to tolerance with UE in neutral, horizontal abd/add,  1 10 ea     Upper trap stretch 1 5 x    AROM ER at side- supine  1 10 x No catching or increased pain this date    Supine sh circles. CW, CCW, serratus punch 1 10 x Good tolerance   Corner stretch pec minor  10 sec 3 x Initiated this date.  Good tolerable stretch noted                  Activities Time    Notes/Comments   Trial kinesiotape to left shoulder upper trap inhibition, deltoid inhibition, anterior sh positioning  X                  Specific Interventions Next Treatment: ROM, posterior capsule stretching, RTC strengthening, scapular strengthening    Activity/Treatment Tolerance:  [x]  Patient tolerated treatment well  []  Patient limited by fatigue  []  Patient limited by pain   []  Patient limited by other medical complications  []  Other:     Assessment: Patient progressing slowly towards goals. Patient is anxious to have MRI and MD appointment to discuss results, as she wants to find answers for her pain. Continued with UE stretching for anterior shoulder and gentle RC strengthening with overall fair tolerance. Applied kinesiotape as patient enjoys the support and decrease pain. Areas for Improvement: impaired ROM, impaired strength and pain  Prognosis: good    GOALS:  Patient Goal: decrease pain, be able to lift her arm, and reach behind her back    Short Term Goals:  Time Frame: 4 weeks  1. Patient will increase left shoulder AROM to 140 degrees flexion, 80 degrees abduction, 60 degrees ER with the arm at the side and thumb tip 4 inches above the waistband to increase ability grooming  2. Patient will increase left shoulder PROM to 140 degrees flexion, 140 degrees abduction, and 80 degrees IR to increase ease and ability for dressing. 3.  Patient will report pain with activity no greater than 3/10 to increase ability to reach into upper cupboards for kitchen items. 4.  Patient will be independent with HEP to increase ease and ability to wash her back. Long Term Goals:  Time Frame: 12 weeks  1. Patient will scratch her back with her left UE without pain and difficulty. 2.  Patient will style her hair with B UE without difficulty or pain. 3.  Patient will fall asleep and sleep through the night without pain. Patient Education:   []  HEP/Education Completed: Plan of Care, Goals   sleeper stretch   5/2/22: posterior capsule stretch, supine dowel flexion horizontal abd/add  []  No new Education completed  [x]  Reviewed Prior HEP, educated on purpose of ultrasound and the purpose and care of kinesiotape      [x]  Patient verbalized and/or demonstrated understanding of education provided. []  Patient unable to verbalize and/or demonstrate understanding of education provided. Will continue education.   [x]  Barriers to learning: none PLAN:  Treatment Recommendations: Strengthening, Range of Motion, Manual Therapy - Soft Tissue Mobilization, Manual Therapy - Joint Manipulation, Home Exercise Program, Patient Education, Integrative Dry Needling and Modalities    []  Plan of care initiated. Plan to see patient 2 times per week for 12 weeks to address the treatment planned outlined above. [x]  Continue with current plan of care  []  Modify plan of care as follows:    []  Hold pending physician visit  []  Discharge    Time In 1045   Time Out 1133   Timed Code Minutes: 48 min   Total Treatment Time: 48 min       Electronically Signed by:   Angelica MARTINS #[id number]

## 2022-05-17 ENCOUNTER — HOSPITAL ENCOUNTER (OUTPATIENT)
Dept: OCCUPATIONAL THERAPY | Age: 47
Setting detail: THERAPIES SERIES
End: 2022-05-17
Payer: COMMERCIAL

## 2022-05-19 ENCOUNTER — APPOINTMENT (OUTPATIENT)
Dept: OCCUPATIONAL THERAPY | Age: 47
End: 2022-05-19
Payer: COMMERCIAL

## 2022-05-23 ENCOUNTER — APPOINTMENT (OUTPATIENT)
Dept: OCCUPATIONAL THERAPY | Age: 47
End: 2022-05-23
Payer: COMMERCIAL

## 2022-05-24 ENCOUNTER — APPOINTMENT (OUTPATIENT)
Dept: OCCUPATIONAL THERAPY | Age: 47
End: 2022-05-24
Payer: COMMERCIAL

## 2022-06-02 ENCOUNTER — PREP FOR PROCEDURE (OUTPATIENT)
Dept: PHYSICAL MEDICINE AND REHAB | Age: 47
End: 2022-06-02

## 2022-06-06 ENCOUNTER — HOSPITAL ENCOUNTER (OUTPATIENT)
Dept: OCCUPATIONAL THERAPY | Age: 47
Setting detail: THERAPIES SERIES
Discharge: HOME | End: 2022-06-06

## 2022-06-06 NOTE — PROGRESS NOTES
[address]  [x] UPDATE NOTE    [x] [facility]AB[facility] [place]   [facility]    [] [facility]   [] Karen Amabile    Date: 2022  Patient Name:  Alina Vargas  : 1975  MRN: [id number]  CSN: [id number]    Referring Practitioner Moses Lawrence MD   Diagnosis M75.42 (ICD-10-CM) - Impingement syndrome of left shoulder   Treatment Diagnosis Decreased ROM left UE, increased pain    Date of Evaluation 22        Spoke with patient this date regarding therapy. Patient recently received and injection and reports that she was told hold therapy after the injection. Patient just returned from vacation with family. Patient reported that she would call the physician today regarding continued pain. Reports that she will let the therapy clinic know recommendations of referring physician.     Loida Mcmullen, MOT, OTR/L [id number]

## 2022-06-06 NOTE — DISCHARGE SUMMARY
[address]  Quick Discharge    [x] [address]   [] [place] [address]    [] [facility]   [] Emma Woods    Date: 2022  Patient Name:  Faye Lopez  : 1975  MRN: [id number]  CSN: [id number]    Referring Practitioner Tiffanie Arnold MD   Diagnosis M75.42 (ICD-10-CM) - Impingement syndrome of left shoulder   Treatment Diagnosis Decreased ROM left UE, increased pain    Date of Evaluation 22        Spoke with patient this date regarding therapy. Patient recently received and injection and reports that she was told hold therapy after the injection. Patient just returned from vacation with family. Patient reported that she would call the physician today regarding continued pain. Reports that she will let the therapy clinic know recommendations of referring physician. Patient returned a call to the clinic in the afternoon and reported that the physician told her that she does not have to do therapy anymore. Discharge this date as a result.     Ananya Goff, MOT, OTR/L [id number]

## 2022-06-09 ENCOUNTER — ANESTHESIA EVENT (OUTPATIENT)
Dept: OPERATING ROOM | Age: 47
End: 2022-06-09
Payer: COMMERCIAL

## 2022-06-09 ENCOUNTER — ANESTHESIA (OUTPATIENT)
Dept: OPERATING ROOM | Age: 47
End: 2022-06-09
Payer: COMMERCIAL

## 2022-06-09 ENCOUNTER — APPOINTMENT (OUTPATIENT)
Dept: GENERAL RADIOLOGY | Age: 47
End: 2022-06-09
Attending: PAIN MEDICINE
Payer: COMMERCIAL

## 2022-06-09 ENCOUNTER — HOSPITAL ENCOUNTER (OUTPATIENT)
Age: 47
Setting detail: OUTPATIENT SURGERY
Discharge: HOME | End: 2022-06-09
Attending: PAIN MEDICINE | Admitting: PAIN MEDICINE
Payer: COMMERCIAL

## 2022-06-09 VITALS
OXYGEN SATURATION: 94 % | TEMPERATURE: 96.9 F | DIASTOLIC BLOOD PRESSURE: 91 MMHG | HEIGHT: 66 IN | BODY MASS INDEX: 31.47 KG/M2 | WEIGHT: 195.8 LBS | RESPIRATION RATE: 14 BRPM | HEART RATE: 96 BPM | SYSTOLIC BLOOD PRESSURE: 136 MMHG

## 2022-06-09 LAB — GLUCOSE BLD-MCNC: 108 MG/DL (ref 70–108)

## 2022-06-09 PROCEDURE — 7100000010 HC PHASE II RECOVERY - FIRST 15 MIN: Performed by: PAIN MEDICINE

## 2022-06-09 PROCEDURE — 3209999900 FLUORO FOR SURGICAL PROCEDURES

## 2022-06-09 PROCEDURE — 3700000000 HC ANESTHESIA ATTENDED CARE: Performed by: PAIN MEDICINE

## 2022-06-09 PROCEDURE — 27096 INJECT SACROILIAC JOINT: CPT | Performed by: PAIN MEDICINE

## 2022-06-09 PROCEDURE — 82948 REAGENT STRIP/BLOOD GLUCOSE: CPT

## 2022-06-09 PROCEDURE — 6360000002 HC RX W HCPCS: Performed by: PAIN MEDICINE

## 2022-06-09 PROCEDURE — 6360000002 HC RX W HCPCS

## 2022-06-09 PROCEDURE — 7100000011 HC PHASE II RECOVERY - ADDTL 15 MIN: Performed by: PAIN MEDICINE

## 2022-06-09 PROCEDURE — 2709999900 HC NON-CHARGEABLE SUPPLY: Performed by: PAIN MEDICINE

## 2022-06-09 PROCEDURE — 2500000003 HC RX 250 WO HCPCS: Performed by: PAIN MEDICINE

## 2022-06-09 PROCEDURE — 6360000004 HC RX CONTRAST MEDICATION: Performed by: PAIN MEDICINE

## 2022-06-09 PROCEDURE — 3600000054 HC PAIN LEVEL 3 BASE: Performed by: PAIN MEDICINE

## 2022-06-09 RX ORDER — PROPOFOL 10 MG/ML
INJECTION, EMULSION INTRAVENOUS PRN
Status: DISCONTINUED | OUTPATIENT
Start: 2022-06-09 | End: 2022-06-09 | Stop reason: SDUPTHER

## 2022-06-09 RX ORDER — LIDOCAINE HYDROCHLORIDE 10 MG/ML
INJECTION, SOLUTION INFILTRATION; PERINEURAL PRN
Status: DISCONTINUED | OUTPATIENT
Start: 2022-06-09 | End: 2022-06-09 | Stop reason: ALTCHOICE

## 2022-06-09 RX ORDER — METHYLPREDNISOLONE ACETATE 80 MG/ML
INJECTION, SUSPENSION INTRA-ARTICULAR; INTRALESIONAL; INTRAMUSCULAR; SOFT TISSUE PRN
Status: DISCONTINUED | OUTPATIENT
Start: 2022-06-09 | End: 2022-06-09 | Stop reason: ALTCHOICE

## 2022-06-09 RX ORDER — BUPIVACAINE HYDROCHLORIDE 2.5 MG/ML
INJECTION, SOLUTION EPIDURAL; INFILTRATION; INTRACAUDAL PRN
Status: DISCONTINUED | OUTPATIENT
Start: 2022-06-09 | End: 2022-06-09 | Stop reason: ALTCHOICE

## 2022-06-09 RX ADMIN — PROPOFOL 50 MG: 10 INJECTION, EMULSION INTRAVENOUS at 10:59

## 2022-06-09 ASSESSMENT — PAIN - FUNCTIONAL ASSESSMENT
PAIN_FUNCTIONAL_ASSESSMENT: PREVENTS OR INTERFERES SOME ACTIVE ACTIVITIES AND ADLS
PAIN_FUNCTIONAL_ASSESSMENT: 0-10

## 2022-06-09 ASSESSMENT — PAIN DESCRIPTION - DESCRIPTORS: DESCRIPTORS: ACHING;THROBBING

## 2022-06-09 NOTE — H&P
[address] [place] [address]  History and Physical Update    Pt Name: Ephraim Robbins  MRN: [id number]  YOB: 1975  Date of evaluation: 6/9/2022      I have examined the patient and reviewed the H&P/Consult and there are no changes to the patient or plans.         Electronically signed by Joselin Chen MD on 6/9/2022 at 9:25 AM

## 2022-06-09 NOTE — ANESTHESIA POSTPROCEDURE EVALUATION
Department of Anesthesiology  Postprocedure Note    Patient: Sergio Parnell  MRN: [id number]  YOB: 1975  Date of evaluation: 6/9/2022  Time:  1:16 PM     Procedure Summary     Date: 06/09/22 Room / Location: [address] [place] [address] 03 / [address] [facility]    Anesthesia Start: 1054 Anesthesia Stop: 4237    Procedure: Left Si injection #2 (Left ) Diagnosis: (SI (sacroiliac) joint inflammation)    Surgeons: Patricia Majano MD Responsible Provider: Ronnie Clarke MD    Anesthesia Type: MAC ASA Status: 3          Anesthesia Type: No value filed. Jorge A Phase I:      Jorge A Phase II: Jorge A Score: 10    Last vitals: Reviewed and per EMR flowsheets.        Anesthesia Post Evaluation    Patient location during evaluation: bedside  Patient participation: complete - patient participated  Level of consciousness: awake  Airway patency: patent  Nausea & Vomiting: no vomiting and no nausea  Complications: no  Cardiovascular status: hemodynamically stable  Respiratory status: acceptable  Hydration status: stable

## 2022-06-09 NOTE — PROGRESS NOTES
1103: Patient arrived to Phase II recovery via cart. Eyes closed with spontaneous respirations even and unlabored. Report received from Tato Shabazz 11  0987: VSS, patient arouses to name. HOB elevated and snack and drink provided. Call light placed within reach. 1130: IV removed without complications. Band aid applied to site. Patient sat edge of bed without difficulty and denies dizziness. 1138: Patient ambulatory to vehicle and discharged home in stable condition with mother.
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
## 2022-06-09 NOTE — ANESTHESIA PRE PROCEDURE
Department of Anesthesiology  Preprocedure Note       Name:  Kenroy Hawkins   Age:  50 y.o.  :  1975                                          MRN:  [id number]         Date:  2022      Surgeon: Willy Garcia):  Jacques Romero MD    Procedure: Procedure(s):  Left Si injection #2    Medications prior to admission:   Prior to Admission medications    Medication Sig Start Date End Date Taking? Authorizing Provider   meloxicam (MOBIC) 7.5 MG tablet Take 7.5 mg by mouth daily    Historical Provider, MD   cyclobenzaprine (FLEXERIL) 5 MG tablet Take 5 mg by mouth as needed for Muscle spasms    Historical Provider, MD   omeprazole (PRILOSEC) 40 MG delayed release capsule Take 40 mg by mouth daily    Historical Provider, MD   gabapentin (NEURONTIN) 300 MG capsule Take 1 capsule by mouth nightly for 30 days.  12/8/21 5/3/22  SALVATORE Carias CNP   tamsulosin (FLOMAX) 0.4 MG capsule Take 1 capsule by mouth daily for 30 doses 6/29/20 5/3/22  SALVATORE Lasstier CNP   acetaminophen (TYLENOL) 325 MG tablet Take 650 mg by mouth every 6 hours as needed for Pain    Historical Provider, MD   amLODIPine (NORVASC) 10 MG tablet Take 10 mg by mouth daily    Historical Provider, MD   metFORMIN (GLUCOPHAGE) 500 MG tablet Take 500 mg by mouth daily (with breakfast)    Historical Provider, MD   glipiZIDE (GLUCOTROL) 5 MG tablet Take 5 mg by mouth daily    Historical Provider, MD   hydrochlorothiazide (HYDRODIURIL) 12.5 MG tablet Take 12.5 mg by mouth Twice a Week Indications: Dr Margarita Lee Provider, MD   metoprolol succinate ER (TOPROL XL) 25 MG XL tablet Take 1 tablet by mouth daily  Patient taking differently: Take 25 mg by mouth 2 times daily  16   SALVATORE Block CNP   lisinopril (PRINIVIL;ZESTRIL) 20 MG tablet Take 1 tablet by mouth 2 times daily 16   SALVATORE Block CNP   aspirin 81 MG tablet Take 1 tablet by mouth daily 6/24/15   Drake Rodriguez MD       Current medications: No current facility-administered medications for this encounter. Allergies: Allergies   Allergen Reactions    Peach [Prunus Persica] Itching and Swelling    Cinnamon Other (See Comments)     Migraine. Lip Swelling. Throat Itching.      Codeine Hives and Itching    Lamictal [Lamotrigine] Itching    Phenergan [Promethazine Hcl] Other (See Comments)     Gets very aggressive     Tramadol Itching and Other (See Comments)     Gets aggressive         Problem List:    Patient Active Problem List   Diagnosis Code    Breast pain in female N64.4    Asthma J45.909    HTN (hypertension) I10    Bipolar 1 disorder ([address] F31.9    Arthritis M19.90    Chest pain, atypical R07.89    Pure hypercholesterolemia E78.00    GERD (gastroesophageal reflux disease) K21.9    History of chest pain resolved after protonix Z87.898       Past Medical History:        Diagnosis Date    Allergic rhinitis     Allergy     Anxiety     Arthritis     Asthma     B[facility]     Bronchitis 2006    Cancer ([facility] 1992    cervical    Depression     Diabetes mellitus ([facility]     History of bronchitis     History of kidney stones     Hyperlipidemia     Hypertension     Ligament tear     left foot    Osteoarthritis     Osteoporosis     PONV (postoperative nausea and vomiting)     Psoriasis     Seizure ([address]        Past Surgical History:        Procedure Laterality Date    APPENDECTOMY  1983    BACK INJECTION Left 3/15/2022    left Si injection #1 performed by Alexandro Devi DO at [address]  2011    Screws removed-[facility]IO    BREAST BIOPSY  08/06/2012    Bilateral Breast biopsies x2    BREAST LUMPECTOMY  10/25/2010    Dr. Martin Jackman of left breast mass    CARPAL TUNNEL RELEASE Left 2012     [facility]    COLONOSCOPY      EGD  2012    Dr Gallegos Lie CAVITY  3/30/11   [employer] FEMUR SURGERY  july 14th 2011    rt femur rods removed-[facility]IO    FOOT SURGERY Left 02/22/2017    tarsal tunnel decompression, neural lysis posterior tibial nerve, wrapping of nerve with amniotic membrane, ligation of varicosities, neurectomy medial calcaneal nerve branch    FOOT SURGERY Left 01/17/2018    Medial Displacement Calcaneal Osteotomy Left Foot, Plantar Fasciotomy with Topaz Left Foot     FOOT SURGERY Left 2/5/2020    EXCISION OF MEDICAL CALCALNEAL NERVE BRANCH  LEFT, REMOVAL OF SCREW FROM POSTERIOR ASPECT OF HEEL LEFT performed by Staci Armas DPM at [facility] ([address] [place] [address])  2007    Dr. Becca Moss (CERVIX STATUS UNKNOWN)      KNEE ARTHROSCOPY  2015    KNEE SURGERY  1999    rt knee-arthoscopy     OSTEOTOMY Left 1/17/2018    MEDIAL DISPLACEMENT CALCANEAL OSTEOTOMY, PLANTAR FASCIOTOMY WITH TOPAZ, GASTROCNEMIS LENGTHING  ALL LEFT FOOT performed by Staci Armas DPM at [place] [address]  july 2011    had rods and screws removed from leg and hip-OIO    SINUS SURGERY  3/16/11    septoplasty    TUBAL LIGATION  2000    UPPER GASTROINTESTINAL ENDOSCOPY      2013       Social History:    Social History     Tobacco Use    Smoking status: Never Smoker    Smokeless tobacco: Never Used   Substance Use Topics    Alcohol use:  No                                Counseling given: Not Answered      Vital Signs (Current):   Vitals:    06/09/22 1010   BP: (!) 149/84   Pulse: 76   Resp: 16   Temp: 97.1 °F (36.2 °C)   TempSrc: Temporal   SpO2: 98%   Weight: 195 lb 12.8 oz (88.8 kg)   Height: 5' 6\" (1.676 m)                                              BP Readings from Last 3 Encounters:   06/09/22 (!) 149/84   05/03/22 134/88   03/15/22 (!) 142/85       NPO Status: Time of last liquid consumption: 2345                        Time of last solid consumption: 2100                        Date of last liquid consumption: 06/08/22                        Date of last solid food consumption: 06/08/22    BMI:   Wt Readings from Last 3 Encounters:   06/09/22 195 lb 12.8 oz (88.8 kg)   05/03/22 199 lb (90.3 kg)   03/15/22 199 lb (90.3 kg)     Body mass index is 31.6 kg/m². CBC:   Lab Results   Component Value Date    WBC 6.5 02/02/2021    RBC 5.42 02/02/2021    RBC 5.07 05/31/2012    HGB 13.9 02/02/2021    HCT 47.0 02/02/2021    MCV 86.7 02/02/2021    RDW 13.6 02/02/2021     02/02/2021       CMP:   Lab Results   Component Value Date     02/02/2021    K 4.1 02/02/2021     02/02/2021    CO2 21 02/02/2021    BUN 11 02/02/2021    CREATININE 0.73 02/02/2021    GFRAA >60 02/02/2021    LABGLOM >60 02/02/2021    LABGLOM 77 06/27/2020    GLUCOSE 151 02/02/2021    GLUCOSE 116 05/31/2012    PROT 6.9 06/27/2020    CALCIUM 9.3 02/02/2021    BILITOT 0.5 06/27/2020    ALKPHOS 66 06/27/2020    AST 23 06/27/2020    ALT 25 06/27/2020       POC Tests:   Recent Labs     06/09/22  1017   POCGLU 108       Coags:   Lab Results   Component Value Date    PROTIME 10.1 01/24/2020    INR 0.9 01/24/2020       HCG (If Applicable):   Lab Results   Component Value Date    PREGSERUM NEGATIVE 06/27/2020        ABGs: No results found for: PHART, PO2ART, QPG8KBN, NQU7SYB, BEART, V9HSARZR     Type & Screen (If Applicable):  No results found for: LABABO, LABRH    Drug/Infectious Status (If Applicable):  Lab Results   Component Value Date    HEPCAB Negative 06/30/2015       COVID-19 Screening (If Applicable): No results found for: COVID19        Anesthesia Evaluation    Airway: Mallampati: II  TM distance: >3 FB   Neck ROM: full  Mouth opening: > = 3 FB   Dental:          Pulmonary:   (+) asthma:                            Cardiovascular:    (+) hypertension:,         Rhythm: regular                      Neuro/Psych:   (+) psychiatric history:            GI/Hepatic/Renal:   (+) GERD:,           Endo/Other:    (+) Diabetes, . Abdominal:   (+) obese,           Vascular:           Other Findings:           Anesthesia Plan      MAC     ASA 3       Induction: intravenous. Anesthetic plan and risks discussed with patient. Plan discussed with CRNA.                     Lesley Aguilar MD   6/9/2022

## 2022-06-09 NOTE — OP NOTE
Pre-Procedure Note    Patient Name: Carlie Ross   YOB: 1975  Medical Record Number: [id number]  Date: 6/9/22     Indication:  SI pain  Consent: On file. Vital Signs:   Vitals:    06/09/22 1010   BP: (!) 149/84   Pulse: 76   Resp: 16   Temp: 97.1 °F (36.2 °C)   SpO2: 98%       Past Medical History:   has a past medical history of Allergic rhinitis, Allergy, Anxiety, Arthritis, Asthma, Bipolar disorder ([address] Bronchitis, Cancer ([address] Depression, Diabetes mellitus ([address] History of bronchitis, History of kidney stones, Hyperlipidemia, Hypertension, Ligament tear, Osteoarthritis, Osteoporosis, PONV (postoperative nausea and vomiting), Psoriasis, and Seizure ([address] Past Surgical History:   has a past surgical history that includes knee surgery (1999); Bony pelvis surgery (2011); sinus surgery (3/16/11); Endoscopic Debridement of Sinonasal Cavi (3/30/11); other surgical history (july 2011); Femur Surgery (july 14th 2011); Hysterectomy (2007); Appendectomy (1983); Elbow surgery (2012); Breast lumpectomy (10/25/2010); Breast biopsy (08/06/2012); Carpal tunnel release (Left, 2012 ); Upper gastrointestinal endoscopy; Foot surgery (Left, 02/22/2017); Foot surgery (Left, 01/17/2018); osteotomy (Left, 1/17/2018); Foot surgery (Left, 2/5/2020); Tubal ligation (2000); Hysterectomy; Knee arthroscopy (2015); EGD (2012); Colonoscopy; and Back Injection (Left, 3/15/2022). Pre-Sedation Documentation and Exam:   Vital signs have been reviewed (see flow sheet for vitals). Sedation/ Anesthesia Plan: MAC    Patient is an appropriate candidate for plan of sedation: yes    Preoperative Diagnosis:  Left sacroiliitis. Postoperative Diagnosis: Left  Sacroiliitis. Procedure Performed:  Left sacroiliac joint injection under fluoroscopy guidance . Indication for the Procedure: The patient failed conservative management  for pain in low back. The patient is tender over the Left SI joint.   Stephan's test is positive on the Left side. As patient is not responding to conservative management and pain is interfering with activities of daily living we decided to proceed with SI joint injection. The procedure and risks were discussed with the patient and an informed consent was obtained. Procedure:     A meaningful communication was kept up with the patient throughout the procedure. The patient is placed in prone position. Skin over the back was prepped and draped in sterile manner. Then using fluoroscopy the Left sacroiliac joint was identified. Then the angle of the fluoroscopy was adjusted such that the view of the caudal aspect of the joint space was optimized. Then skin and deep tissues over the caudal aspect of the joint were infiltrated with 4 ml of 1% lidocaine. The #22-gauge, 3-1/2 inch spinal needle was introduced through the skin wheal and directed such that the tip of the needle lies in the joint space. This was confirmed by injecting 0.5 ml of Omnipaque-180 through the needle and observing the spread of the contrast along the joint space. Then after negative aspiration a total of 80  mg of depomedrol with 1 ml of  0.25% Marcaine was injected through the needle. The needle is removed and a Band-Aid was placed over the needle insertion site. EBL-0  The patient tolerated the procedure well and vital signs remained stable. The patient was discharged home in stable condition and will be followed in the pain clinic in the next few weeks or further planning.     Electronically signed by Jacques Romero MD on 6/9/22 at 10:57 AM EDT

## 2022-06-10 ENCOUNTER — NURSE TRIAGE (OUTPATIENT)
Dept: OTHER | Facility: CLINIC | Age: 47
End: 2022-06-10

## 2022-06-10 ENCOUNTER — TELEPHONE (OUTPATIENT)
Dept: PHYSICAL MEDICINE AND REHAB | Age: 47
End: 2022-06-10

## 2022-06-10 NOTE — TELEPHONE ENCOUNTER
Patient had injections yesterday 06/09/2022. She said sitting here she is having a lot of pain but if she tries to put any weight on it, the pain is intensified. Symptoms started at 6 pm yesterday and by morning she cant put weight on it.   Please advise  [phone]

## 2022-06-14 NOTE — TELEPHONE ENCOUNTER
Pt. Contacted. States pain is gradually improving. States she will wait until follow up on 6/21/2022 for follow up appointment.

## 2022-07-11 ENCOUNTER — OFFICE VISIT (OUTPATIENT)
Dept: PHYSICAL MEDICINE AND REHAB | Age: 47
End: 2022-07-11
Payer: COMMERCIAL

## 2022-07-11 VITALS
SYSTOLIC BLOOD PRESSURE: 122 MMHG | WEIGHT: 195 LBS | HEIGHT: 66 IN | BODY MASS INDEX: 31.34 KG/M2 | DIASTOLIC BLOOD PRESSURE: 90 MMHG

## 2022-07-11 DIAGNOSIS — M46.1 SI (SACROILIAC) JOINT INFLAMMATION (HCC): ICD-10-CM

## 2022-07-11 DIAGNOSIS — M48.07 LUMBOSACRAL SPINAL STENOSIS: ICD-10-CM

## 2022-07-11 DIAGNOSIS — M47.816 SPONDYLOSIS OF LUMBAR REGION WITHOUT MYELOPATHY OR RADICULOPATHY: Primary | ICD-10-CM

## 2022-07-11 DIAGNOSIS — M53.3 SACROILIAC JOINT DYSFUNCTION: ICD-10-CM

## 2022-07-11 DIAGNOSIS — G89.4 CHRONIC PAIN SYNDROME: ICD-10-CM

## 2022-07-11 DIAGNOSIS — G62.9 NEUROPATHY: ICD-10-CM

## 2022-07-11 PROCEDURE — 99214 OFFICE O/P EST MOD 30 MIN: CPT | Performed by: NURSE PRACTITIONER
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
## 2022-07-18 ENCOUNTER — HOSPITAL ENCOUNTER (OUTPATIENT)
Dept: OCCUPATIONAL THERAPY | Age: 47
Setting detail: THERAPIES SERIES
Discharge: HOME | End: 2022-07-18
Payer: COMMERCIAL

## 2022-07-18 PROCEDURE — 97140 MANUAL THERAPY 1/> REGIONS: CPT

## 2022-07-18 PROCEDURE — 97110 THERAPEUTIC EXERCISES: CPT

## 2022-07-18 NOTE — PROGRESS NOTES
Pt. Reporting 5/10 pain in left shoulder currently     Social/Functional History:  Electronic Medical Record reviewed and up to date    Martin Simmons lives with spouse  Task Prior Level of Function  (current level of function addressed below)   ADLs  Independent   Ambulation Independent   Transfers Independent   Hobbies Craft, gardening   Driving Active    Work Unemployed     OBJECTIVE:  Hand Dominance right handed   Palpation    Observation Pt. Still has pain pump in place, post op bandages in place. Incision sites look good, no signs of infection. Posture    Edema    Special Tests        ADL's Pt. Just had surgery Friday and is having difficulty with all self care and household chores. Bed Mobility     Transfers    Balance        Sensation Pt. Has pain pump in but states it ran out of meds on Saturday. States that her fingers are still a little numb   Coordination WFL           LEFT UPPER EXTREMITY  RANGE OF MOTION    AROM PROM COMMENTS         Shoulder Flexion  65    Shoulder Extension      Shoulder Abduction      Shoulder Adduction      Shoulder External Rotation  25    Shoulder Internal Rotation      Shoulder Range of Motion is [place] [place]  []      Elbow Flexion      Elbow Extension      Forearm Pronation      Forearm Supination      Elbow Range of Motion is [place] [place]  [x]      Wrist Flexion      Wrist Extension      Wrist Radial Deviation      Wrist Ulnar Deviation      Wrist Range of Motion is [facility] [place]  [x]   If no measurement is recorded, no formal assessment was completed for that motion. LEFT UPPER EXTREMITY  STRENGTH    Strength Rating Comments   Shoulder Flexion  Not tested due to surgery 2 days ago   Shoulder Extension     Shoulder Abduction     Shoulder Adduction     Shoulder External Rotation     Shoulder Internal Rotation     []  Shoulder Strength is grossly WFL. Elbow Flexion     Elbow Extension     Forearm Pronation     Forearm Supination     [] Elbow Strength is grossly WFL.       Wrist Flexion     Wrist Extension     Wrist Radial Deviation     Wrist Ulnar Deviation     []  Wrist Strength is grossly WFL. Right Left    Strength Setting:      Pinch Strength Tip Pinch:      Lateral Pinch           If no ratings are recorded, no formal assessment was completed. TREATMENT   Precautions: Follow Dr. Edwin Wellington Type 1 RCR protocol with     Pain: 5/10 left shoulder     X in shaded column indicates Activity Completed Today   Modalities Parameters/  Location  Notes/Comments                     Manual Therapy Time/  Technique  Notes/Comments   PROM left shoulder for flexion and ER within protocol  x                Exercises   Sets/  Sec Reps  Notes/Comments   AAROM left elbow; AROM forearm, wrist and hand 1 5 x    Pendulums all directions 1 10 ea x    Scapular pinches 1 5 x    Table slides 1 5 x                         Activities Time    Notes/Comments   Adjusted sling for proper fit  x    Removed pain pump and changed post op bandages  x            Specific Interventions Next Treatment: Per Dr. Edwin Wellington type 1 RCR protocol    Activity/Treatment Tolerance:  []  Patient tolerated treatment well  []  Patient limited by fatigue  [x]  Patient limited by pain   []  Patient limited by other medical complications  []  Other:     Assessment: Pt. Presents for OT eval for left shoulder s/p RCR and BT on Friday. Removed pain pump during session today and applied bandaids to incision sites, no signs of infection. Pt. Painful and tearful for PROM today. Adjusted sling for proper fit. Pt. Is leaving town tomorrow for an out of town  and will not be back until . Pt. Also needs late appointment times. Due to these factors, pt. Is not scheduled until next Tuesday. Pt. Instructed on HEP. Pt.  Would benefit from continued therapy for progression through treatment protocol  Areas for Improvement: impaired ROM, impaired strength, and pain  Prognosis: good    GOALS:  Patient Goal: to decrease pain in left shoulder for return to normal activities    Short Term Goals:  Time Frame: 4 weeks  Pt. Will demo independence with HEP for LUE for improved motion, decreased pain and eventual strength for return to normal routines  Pt. Will demo improved PROM left shoulder for flexion= 95, and ER= 35 for ease with eventual AROM and bathing and dressing  Pt. Will report decreased pain left shoulder to no greater than 5/10 with PROM for ease with HEP and sleeping    Long Term Goals:  Time Frame: 12 weeks  Pt. Will demo functional AROM left shoulder to be able to reach into first shelf of upper cupboard to obtain lightweight items for cooking  Pt. Will be able to drive using LUE to hold and turn steering wheel  Pt. Will be able to use LUE for gardening tasks with minimal to no pain or difficulty    Patient Education:   [x]  HEP/Education Completed: Plan of Care, Goals, reviewed Dr. Delvin Arriaga protocol with pt. Use of sling at all times except exercises and bath, icing shoulder every hour and after exercises  [employer] Access Code for HEP: pendulums all directions, scap pinches, table slides, AROM elbow, forearm, wrist and hand - handouts given  []  No new Education completed  []  Reviewed Prior HEP      [x]  Patient verbalized and/or demonstrated understanding of education provided. []  Patient unable to verbalize and/or demonstrate understanding of education provided. Will continue education. [x]  Barriers to learning: none    PLAN:  Treatment Recommendations: Strengthening, Range of Motion, Manual Therapy - Soft Tissue Mobilization, Home Exercise Program, and Self-Care Education and Training per treatment protocol    [x]  Plan of care initiated. Plan to see patient 2 times per week for 12 weeks to address the treatment planned outlined above.   []  Continue with current plan of care  []  Modify plan of care as follows:    []  Hold pending physician visit  []  Discharge    Time In 1100   Time Out 1200   Timed Code Minutes: 25 min Total Treatment Time: 60 min       Electronically Signed by: Garrett Ramirez, OTR/CA [id number]

## 2022-07-26 ENCOUNTER — HOSPITAL ENCOUNTER (OUTPATIENT)
Dept: OCCUPATIONAL THERAPY | Age: 47
Setting detail: THERAPIES SERIES
Discharge: HOME | End: 2022-07-26
Payer: COMMERCIAL

## 2022-07-26 PROCEDURE — 97140 MANUAL THERAPY 1/> REGIONS: CPT

## 2022-07-26 PROCEDURE — 97110 THERAPEUTIC EXERCISES: CPT

## 2022-07-26 NOTE — PROGRESS NOTES
[address]  [] EVALUATION  [x] DAILY NOTE (LAND) [] DAILY NOTE (AQUATIC ) [] PROGRESS NOTE [] DISCHARGE NOTE    [x] OUTPATIENT REHABILITATION [facility]   [] [place] [address]    [] [facility]   [] Madelyn Perez    Date: 2022  Patient Name:  Juarez Kaba  : 1975  MRN: [id number]  CSN: [id number]    Referring Practitioner Daniel Zavala MD   Diagnosis Left Rotator cuff tear   Treatment Diagnosis Decreased ROM, strength, and pain Left UE s/p RCR   Date of Evaluation 22      Functional Outcome Measure Used UEFS   Functional Outcome Score  (22)       Insurance: Primary: Payor: UMR /  /  / ,   Secondary:    Authorization Information: No precert needed, 60 vs. Combined of OT/PT/ST   Visit # 2, 2/10 for progress note   Visits Allowed:    Recertification Date: Oct. 10, 2022   Physician Follow-Up: 2022   Physician Orders: Use type I RCR protocol, no bicep resistance x6 weeks   Pertinent History: Pt. Was here for OT for left shoulder pain earlier this year but did not have a significant improvement. Pt. Then had further testing which showed a rotator cuff tear. Pt. Underwent surgery 7/15/22 for RCR and Bicep tenodesis. SUBJECTIVE: Pt. Reports that her exercises have gone well over the past week. Reports 4/10 pain in left shoulder today - states most of her pain is in the anterior shoulder    OBJECTIVE:  TREATMENT   Precautions:  Follow Dr. Joelle Freitas Type 1 RCR protocol with     Pain: 4/10 left anterior shoulder     X in shaded column indicates Activity Completed Today   Modalities Parameters/  Location  Notes/Comments                     Manual Therapy Time/  Technique  Notes/Comments   PROM left shoulder for flexion and ER within protocol  xx    STM to upper trap  xx          Exercises   Sets/  Sec Reps  Notes/Comments   AAROM left elbow; AROM forearm, wrist and hand 1 10 ea xx    Pendulums all directions 1 10 ea xX    Scapular pinches 1 10 xX    Table slides 1 10 xX                         Activities Time    Notes/Comments   Adjusted sling for proper fit      Removed pain pump and changed post op bandages              Specific Interventions Next Treatment: Per Dr. Sunil Tirado type 1 RCR protocol    Activity/Treatment Tolerance:  []  Patient tolerated treatment well  []  Patient limited by fatigue  [x]  Patient limited by pain   []  Patient limited by other medical complications  []  Other:     Assessment: Pt. Presents for first OT treatment session after eval today due to being out of town since eval. Pt. Reports compliance with HEP. Pt. Painful for PROM. Reports pain in anterior shoulder. Has follow up with doctor tomorrow. Areas for Improvement: impaired ROM, impaired strength, and pain  Prognosis: good    GOALS:  Patient Goal: to decrease pain in left shoulder for return to normal activities    Short Term Goals:  Time Frame: 4 weeks  Pt. Will demo independence with HEP for LUE for improved motion, decreased pain and eventual strength for return to normal routines  Pt. Will demo improved PROM left shoulder for flexion= 95, and ER= 35 for ease with eventual AROM and bathing and dressing  Pt. Will report decreased pain left shoulder to no greater than 5/10 with PROM for ease with HEP and sleeping    Long Term Goals:  Time Frame: 12 weeks  Pt. Will demo functional AROM left shoulder to be able to reach into first shelf of upper cupboard to obtain lightweight items for cooking  Pt. Will be able to drive using LUE to hold and turn steering wheel  Pt. Will be able to use LUE for gardening tasks with minimal to no pain or difficulty    Patient Education:   [x]  HEP/Education Completed: Plan of Care, Goals, reviewed Dr. Viviane Craven protocol with pt.  Use of sling at all times except exercises and bath, icing shoulder every hour and after exercises  [employer] for HEP: pendulums all directions, scap pinches, table slides, AROM elbow, forearm, wrist and hand - handouts given  []  No new Education completed  [x]  Reviewed Prior HEP      [x]  Patient verbalized and/or demonstrated understanding of education provided. []  Patient unable to verbalize and/or demonstrate understanding of education provided. Will continue education. [x]  Barriers to learning: none    PLAN:  Treatment Recommendations: Strengthening, Range of Motion, Manual Therapy - Soft Tissue Mobilization, Home Exercise Program, and Self-Care Education and Training per treatment protocol    [x]  Plan of care initiated. Plan to see patient 2 times per week for 12 weeks to address the treatment planned outlined above.   [x]  Continue with current plan of care  []  Modify plan of care as follows:    []  Hold pending physician visit  []  Discharge    Time In 1645   Time Out 1715   Timed Code Minutes: 30 min   Total Treatment Time: 30 min       Electronically Signed by: HOSEA Valles/CA [id number]

## 2022-07-28 ENCOUNTER — HOSPITAL ENCOUNTER (OUTPATIENT)
Dept: OCCUPATIONAL THERAPY | Age: 47
Setting detail: THERAPIES SERIES
Discharge: HOME | End: 2022-07-28
Payer: COMMERCIAL

## 2022-07-28 PROCEDURE — 97140 MANUAL THERAPY 1/> REGIONS: CPT

## 2022-07-28 PROCEDURE — 97110 THERAPEUTIC EXERCISES: CPT

## 2022-07-28 NOTE — PROGRESS NOTES
[address]  [] EVALUATION  [x] DAILY NOTE (LAND) [] DAILY NOTE (AQUATIC ) [] PROGRESS NOTE [] DISCHARGE NOTE    [x] OUTPATIENT REHABILITATION [facility]   [] [place]     [] [facility]   [] Yamila Irizarry    Date: 2022  Patient Name:  Jane Doll  : 1975  MRN: [id number]  CSN: [id number]    Referring Practitioner Hernan Beatty MD   Diagnosis Left Rotator cuff tear   Treatment Diagnosis Decreased ROM, strength, and pain Left UE s/p RCR   Date of Evaluation 22      Functional Outcome Measure Used UEFS   Functional Outcome Score  (22)       Insurance: Primary: Payor: UMR /  /  / ,   Secondary:    Authorization Information: No precert needed, 60 vs. Combined of OT/PT/ST   Visit # 3, 3/10 for progress note   Visits Allowed:    Recertification Date: Oct. 10, 2022   Physician Follow-Up: 2022   Physician Orders: Use type I RCR protocol, no bicep resistance x6 weeks   Pertinent History: Pt. Was here for OT for left shoulder pain earlier this year but did not have a significant improvement. Pt. Then had further testing which showed a rotator cuff tear. Pt. Underwent surgery 7/15/22 for RCR and Bicep tenodesis. SUBJECTIVE: Pt. Reports that she was sore after last treatment session but recovered by next day. States she is not having pain upon arrival today to therapy. Pt. Had follow up with doctor yesterday and had stitches removed. OBJECTIVE:  TREATMENT   Precautions:  Follow Dr. Caitlin Montes Type 1 RCR protocol with     Pain: none left shoulder upon arrival     X in shaded column indicates Activity Completed Today   Modalities Parameters/  Location  Notes/Comments                     Manual Therapy Time/  Technique  Notes/Comments   PROM left shoulder for flexion, ER in scaption, IR in scaption within protocol  x    STM to upper trap  x          Exercises   Sets/  Sec Reps  Notes/Comments   JOHAN left elbow; AROM forearm, wrist and hand 1 10 ea x    Pendulums all directions 1 10 ea x    Scapular pinches 1 10 x    Table slides 1 10 x    Pulleys for shoulder flexion only to tolerance 1 10 x Pt, able to get to ~90 degrees                 Activities Time    Notes/Comments   Adjusted sling for proper fit      Removed pain pump and changed post op bandages              Specific Interventions Next Treatment: Per Dr. Roney Hashimoto type 1 RCR protocol    Activity/Treatment Tolerance:  []  Patient tolerated treatment well  []  Patient limited by fatigue  [x]  Patient limited by pain   []  Patient limited by other medical complications  []  Other:     Assessment: Pt. Progressing toward goals. Had follow up with doctor and had stitches removed. Pt. Tolerating PROM better today  Areas for Improvement: impaired ROM, impaired strength, and pain  Prognosis: good    GOALS:  Patient Goal: to decrease pain in left shoulder for return to normal activities    Short Term Goals:  Time Frame: 4 weeks  Pt. Will demo independence with HEP for LUE for improved motion, decreased pain and eventual strength for return to normal routines  Pt. Will demo improved PROM left shoulder for flexion= 95, and ER= 35 for ease with eventual AROM and bathing and dressing  Pt. Will report decreased pain left shoulder to no greater than 5/10 with PROM for ease with HEP and sleeping    Long Term Goals:  Time Frame: 12 weeks  Pt. Will demo functional AROM left shoulder to be able to reach into first shelf of upper cupboard to obtain lightweight items for cooking  Pt. Will be able to drive using LUE to hold and turn steering wheel  Pt. Will be able to use LUE for gardening tasks with minimal to no pain or difficulty    Patient Education:   []  HEP/Education Completed: Plan of Care, Goals, reviewed Dr. Fabiana Glasgow protocol with pt.  Use of sling at all times except exercises and bath, icing shoulder every hour and after exercises  Medbridge for HEP: pendulums all directions, scap pinches, table slides, AROM elbow, forearm, wrist and hand - handouts given  []  No new Education completed  [x]  Reviewed Prior HEP      [x]  Patient verbalized and/or demonstrated understanding of education provided. []  Patient unable to verbalize and/or demonstrate understanding of education provided. Will continue education. [x]  Barriers to learning: none    PLAN:  Treatment Recommendations: Strengthening, Range of Motion, Manual Therapy - Soft Tissue Mobilization, Home Exercise Program, and Self-Care Education and Training per treatment protocol    [x]  Plan of care initiated. Plan to see patient 2 times per week for 12 weeks to address the treatment planned outlined above.   [x]  Continue with current plan of care  []  Modify plan of care as follows:    []  Hold pending physician visit  []  Discharge    Time In 1645   Time Out 1720   Timed Code Minutes: 35 min   Total Treatment Time: 35 min       Electronically Signed by: HOSEA Serrano/CA [id number]

## 2022-08-02 ENCOUNTER — HOSPITAL ENCOUNTER (OUTPATIENT)
Dept: OCCUPATIONAL THERAPY | Age: 47
Setting detail: THERAPIES SERIES
Discharge: HOME | End: 2022-08-02
Payer: COMMERCIAL

## 2022-08-02 PROCEDURE — 97110 THERAPEUTIC EXERCISES: CPT

## 2022-08-02 PROCEDURE — 97140 MANUAL THERAPY 1/> REGIONS: CPT

## 2022-08-02 NOTE — PROGRESS NOTES
[address]  [] EVALUATION  [x] DAILY NOTE (LAND) [] DAILY NOTE (AQUATIC ) [] PROGRESS NOTE [] DISCHARGE NOTE    [x] OUTPATIENT REHABILITATION [facility]   [] [place] [address]    [] [facility]   [] Mary Rehman    Date: 2022  Patient Name:  Jacob Majano  : 1975  MRN: [id number]  CSN: [id number]    Referring Practitioner Willow Manzanares MD   Diagnosis Left Rotator cuff tear   Treatment Diagnosis Decreased ROM, strength, and pain Left UE s/p RCR   Date of Evaluation 22      Functional Outcome Measure Used UEFS   Functional Outcome Score  (22)       Insurance: Primary: Payor: UMR /  /  / ,   Secondary:    Authorization Information: No precert needed, 60 vs. Combined of OT/PT/ST   Visit # 4, 4/10 for progress note   Visits Allowed: 20   Recertification Date: Oct. 10, 2022   Physician Follow-Up: 2022   Physician Orders: Use type I RCR protocol, no bicep resistance x6 weeks   Pertinent History: Pt. Was here for OT for left shoulder pain earlier this year but did not have a significant improvement. Pt. Then had further testing which showed a rotator cuff tear. Pt. Underwent surgery 7/15/22 for RCR and Bicep tenodesis. SUBJECTIVE: Patient reports \"everything is sore\" with reference her R side is really bothering her as well with using for all tasks. Patient reports her L elbow is stiff as well. Patient reports yesterday is when her pain started to increase. OBJECTIVE:  TREATMENT   Precautions:  Follow Dr. Jackelin Celestin Type 1 RCR protocol with     Pain: 5/10- reference bicep and upper trap      X in shaded column indicates Activity Completed Today   Modalities Parameters/  Location  Notes/Comments                     Manual Therapy Time/  Technique  Notes/Comments   PROM left shoulder for flexion, ER in scaption, IR in scaption within protocol  x    STM to upper trap  x          Exercises   Sets/  Sec Reps Notes/Comments   AAROM left elbow; AROM forearm, wrist and hand 1 10 ea x    Pendulums all directions 1 10 ea x    Scapular pinches 1 10 x    Table slides 1 10 x Cues for technique to bend at hip with improved tolerance and decreased pain    Pulleys for shoulder flexion only to tolerance 1 10 x Improvement noted, patient able to achieve past 90 degrees, pt. Pleased with progress vs first attempt    Supine dowel ER  1 5 x Initiated this date. Cues for technique with good tolerance, tight end range           Activities Time    Notes/Comments   Adjusted sling for proper fit      Removed pain pump and changed post op bandages              Specific Interventions Next Treatment: Per Dr. Rachel Hernandez type 1 RCR protocol    Activity/Treatment Tolerance:  []  Patient tolerated treatment well  []  Patient limited by fatigue  [x]  Patient limited by pain   []  Patient limited by other medical complications  []  Other:     Assessment: Patient progressing towards goals. Cues for improved technique throughout session, with decreased pain noted. Fair tolerance with PROM all end ranges. Continued AAROM dowel this date, with good tolerance. Patient to be 3 week post op on Friday. Areas for Improvement: impaired ROM, impaired strength, and pain  Prognosis: good    GOALS:  Patient Goal: to decrease pain in left shoulder for return to normal activities    Short Term Goals:  Time Frame: 4 weeks  Pt. Will demo independence with HEP for LUE for improved motion, decreased pain and eventual strength for return to normal routines  Pt. Will demo improved PROM left shoulder for flexion= 95, and ER= 35 for ease with eventual AROM and bathing and dressing  Pt. Will report decreased pain left shoulder to no greater than 5/10 with PROM for ease with HEP and sleeping    Long Term Goals:  Time Frame: 12 weeks  Pt. Will demo functional AROM left shoulder to be able to reach into first shelf of upper cupboard to obtain lightweight items for cooking  Pt. Will be able to drive using LUE to hold and turn steering wheel  Pt. Will be able to use LUE for gardening tasks with minimal to no pain or difficulty    Patient Education:   []  HEP/Education Completed: Plan of Care, Goals, reviewed Dr. Andrew Linder protocol with pt. Use of sling at all times except exercises and bath, icing shoulder every hour and after exercises  Medbridge for HEP: pendulums all directions, scap pinches, table slides, AROM elbow, forearm, wrist and hand - handouts given  []  No new Education completed  [x]  Reviewed Prior HEP      [x]  Patient verbalized and/or demonstrated understanding of education provided. []  Patient unable to verbalize and/or demonstrate understanding of education provided. Will continue education. [x]  Barriers to learning: none    PLAN:  Treatment Recommendations: Strengthening, Range of Motion, Manual Therapy - Soft Tissue Mobilization, Home Exercise Program, and Self-Care Education and Training per treatment protocol    []  Plan of care initiated. Plan to see patient 2 times per week for 12 weeks to address the treatment planned outlined above. [x]  Continue with current plan of care  []  Modify plan of care as follows:    []  Hold pending physician visit  []  Discharge    Time In 1612   Time Out 1645   Timed Code Minutes: 33 min   Total Treatment Time: 33 min       Electronically Signed by:  Camelia MARTINS #[id number]

## 2022-08-04 ENCOUNTER — HOSPITAL ENCOUNTER (OUTPATIENT)
Dept: OCCUPATIONAL THERAPY | Age: 47
Setting detail: THERAPIES SERIES
Discharge: HOME | End: 2022-08-04
Payer: COMMERCIAL

## 2022-08-04 PROCEDURE — 97110 THERAPEUTIC EXERCISES: CPT

## 2022-08-04 PROCEDURE — 97140 MANUAL THERAPY 1/> REGIONS: CPT

## 2022-08-04 NOTE — PROGRESS NOTES
[address]  [] EVALUATION  [x] DAILY NOTE (LAND) [] DAILY NOTE (AQUATIC ) [] PROGRESS NOTE [] DISCHARGE NOTE    [x] OUTPATIENT REHABILITATION CENTER [facility]   [] Anabelle Pyle    [] [facility]   [] Yamila Irizarry    Date: 2022  Patient Name:  Jane Doll  : 1975  MRN: [id number]  CSN: [id number]    Referring Practitioner Hernan Beatty MD   Diagnosis Left Rotator cuff tear   Treatment Diagnosis Decreased ROM, strength, and pain Left UE s/p RCR   Date of Evaluation 22      Functional Outcome Measure Used UEFS   Functional Outcome Score  (22)       Insurance: Primary: Payor: UMR /  /  / ,   Secondary:    Authorization Information: No precert needed, 60 vs. Combined of OT/PT/ST   Visit # 5, 5/10 for progress note   Visits Allowed:    Recertification Date: Oct. 10, 2022   Physician Follow-Up: 2022   Physician Orders: Use type I RCR protocol, no bicep resistance x6 weeks   Pertinent History: Pt. Was here for OT for left shoulder pain earlier this year but did not have a significant improvement. Pt. Then had further testing which showed a rotator cuff tear. Pt. Underwent surgery 7/15/22 for RCR and Bicep tenodesis. SUBJECTIVE: Patient reports left bicep is really painful today even with taking a muscle relaxer    OBJECTIVE:  TREATMENT   Precautions:  Follow Dr. Caitlin Montes Type 1 RCR protocol with     Pain: /10- reference bicep     X in shaded column indicates Activity Completed Today   Modalities Parameters/  Location  Notes/Comments                     Manual Therapy Time/  Technique  Notes/Comments   PROM left shoulder for flexion, ER in scaption, IR in scaption within protocol  xx Allowed IR/ER to 45 degrees at 90 and flexion to 105   STM to upper trap and bicep  xx Used biofreeze on bicep today due to pain - bicep is knotty today         Exercises   Sets/  Sec Reps  Notes/Comments   AAROM left elbow; AROM forearm, wrist and hand 1 10 ea xx    Pendulums all directions 1 10 ea xx    Scapular pinches 1 10 xx    Table slides 1 10 xx    Pulleys for shoulder flexion to tolerance 1 10 xx Able to get to ~100 degrees shoulder flexion today   Supine dowel ER; supine dowel for chest press with CGA to LUE from therapist 1 10 xx Initiated chest press today per protocol with good tolerance   Submax isometrics all directions in supine 5 sec 5 ea xx Initiated today per protocol with good tolerance          Activities Time    Notes/Comments   Adjusted sling for proper fit      Removed pain pump and changed post op bandages              Specific Interventions Next Treatment: Per Dr. Remigio Shannon type 1 RCR protocol    Activity/Treatment Tolerance:  []  Patient tolerated treatment well  []  Patient limited by fatigue  [x]  Patient limited by pain   []  Patient limited by other medical complications  []  Other:     Assessment: Patient progressing towards goals. Increased bicep pain today with tightness - provided STM with biofreeze which seemed to help. Progressed with activities due to pt. 3 weeks post op now - added supine dowel for chest press as well as submax isometrics. Pt. Has to cancel appointment for Tuesday next week due to  has to work first shift and will not be able to bring her and there are no other late appointments available. Areas for Improvement: impaired ROM, impaired strength, and pain  Prognosis: good    GOALS:  Patient Goal: to decrease pain in left shoulder for return to normal activities    Short Term Goals:  Time Frame: 4 weeks  Pt. Will demo independence with HEP for LUE for improved motion, decreased pain and eventual strength for return to normal routines  Pt. Will demo improved PROM left shoulder for flexion= 95, and ER= 35 for ease with eventual AROM and bathing and dressing  Pt.  Will report decreased pain left shoulder to no greater than 5/10 with PROM for ease with HEP and sleeping    Long Term Goals: Time Frame: 12 weeks  Pt. Will demo functional AROM left shoulder to be able to reach into first shelf of upper cupboard to obtain lightweight items for cooking  Pt. Will be able to drive using LUE to hold and turn steering wheel  Pt. Will be able to use LUE for gardening tasks with minimal to no pain or difficulty    Patient Education:   []  HEP/Education Completed: Plan of Care, Goals, reviewed Dr. Delvin Arriaga protocol with pt. Use of sling at all times except exercises and bath, icing shoulder every hour and after exercises  Medbridge for HEP: pendulums all directions, scap pinches, table slides, AROM elbow, forearm, wrist and hand - handouts given  []  No new Education completed  [x]  Reviewed Prior HEP      [x]  Patient verbalized and/or demonstrated understanding of education provided. []  Patient unable to verbalize and/or demonstrate understanding of education provided. Will continue education. [x]  Barriers to learning: none    PLAN:  Treatment Recommendations: Strengthening, Range of Motion, Manual Therapy - Soft Tissue Mobilization, Home Exercise Program, and Self-Care Education and Training per treatment protocol    []  Plan of care initiated. Plan to see patient 2 times per week for 12 weeks to address the treatment planned outlined above.   [x]  Continue with current plan of care  []  Modify plan of care as follows:    []  Hold pending physician visit  []  Discharge    Time In 1645   Time Out 1625   Timed Code Minutes: 40 min   Total Treatment Time: 40 min       Electronically Signed by: Corrine Crowley, OTR/L [id number]

## 2022-08-09 ENCOUNTER — HOSPITAL ENCOUNTER (OUTPATIENT)
Dept: OCCUPATIONAL THERAPY | Age: 47
Setting detail: THERAPIES SERIES
End: 2022-08-09
Payer: COMMERCIAL

## 2022-08-11 ENCOUNTER — HOSPITAL ENCOUNTER (OUTPATIENT)
Dept: OCCUPATIONAL THERAPY | Age: 47
Setting detail: THERAPIES SERIES
Discharge: HOME | End: 2022-08-11
Payer: COMMERCIAL

## 2022-08-11 PROCEDURE — 97140 MANUAL THERAPY 1/> REGIONS: CPT

## 2022-08-11 PROCEDURE — 97110 THERAPEUTIC EXERCISES: CPT

## 2022-08-11 NOTE — PROGRESS NOTES
[address]  [] EVALUATION  [x] DAILY NOTE (LAND) [] DAILY NOTE (AQUATIC ) [] PROGRESS NOTE [] DISCHARGE NOTE    [x] OUTPATIENT REHABILITATION [facility]   [] [place] [address]    [] [facility]   [] [facility]    Date: 2022  Patient Name:  Juan Hyman  : 1975  MRN: [id number]  CSN: [id number]    Referring Practitioner Sandra Bell MD   Diagnosis Left Rotator cuff tear   Treatment Diagnosis Decreased ROM, strength, and pain Left UE s/p RCR   Date of Evaluation 22      Functional Outcome Measure Used UEFS   Functional Outcome Score  (22)       Insurance: Primary: Payor: UMR /  /  / ,   Secondary:    Authorization Information: No precert needed, 60 vs. Combined of OT/PT/ST   Visit # 6, 6/10 for progress note   Visits Allowed: 20   Recertification Date: Oct. 10, 2022   Physician Follow-Up: 2022   Physician Orders: Use type I RCR protocol, no bicep resistance x6 weeks   Pertinent History: Pt. Was here for OT for left shoulder pain earlier this year but did not have a significant improvement. Pt. Then had further testing which showed a rotator cuff tear. Pt. Underwent surgery 7/15/22 for RCR and Bicep tenodesis. SUBJECTIVE: Patient reports left bicep and upper trap are painful today as well as around shoulder blade     OBJECTIVE:  TREATMENT   Precautions:  Follow Dr. Rachel Hernandez Type 1 RCR protocol with     Pain: 210- reference bicep and upper trap     X in shaded column indicates Activity Completed Today   Modalities Parameters/  Location  Notes/Comments                     Manual Therapy Time/  Technique  Notes/Comments   PROM left shoulder for flexion, ER in scaption, IR in scaption within protocol  x Allowed IR/ER to 45 degrees at 90 and flexion to 105   Sidelying STM to upper trap; STM around medial border of scapula and bicep  x Used biofreeze on upper trap and bicep today   Sidelying scap mobs with focus on inferior glide   x    Exercises   Sets/  Sec Reps  Notes/Comments   AAROM left elbow; AROM forearm, wrist and hand 1 10 ea x    Pendulums all directions 1 10 ea x    Scapular pinches and depression in sidelying  1 10 x    Table slides 1 10 x    Pulleys for shoulder flexion to tolerance 1 10 x    Supine dowel ER; supine dowel for chest press  1 10 x    Submax isometrics all directions in supine 5 sec 5 ea x    Leaning over rows LUE 1 10 x Initiated today with good tolerance                 Activities Time    Notes/Comments   Adjusted sling for proper fit      Removed pain pump and changed post op bandages              Specific Interventions Next Treatment: Per Dr. Nikky Maldonado type 1 RCR protocol    Activity/Treatment Tolerance:  [x]  Patient tolerated treatment well  []  Patient limited by fatigue  []  Patient limited by pain   []  Patient limited by other medical complications  []  Other:     Assessment: Patient progressing towards goals. Pt. Having bicep and upper trap pain today - provided STM with biofreeze. Pt. Stated the leaning over rows actually felt good today. Will try to progress to light theraband next week and supine AROM per protocol. Did not want to do isotonic bicep today due to pt's pain    Areas for Improvement: impaired ROM, impaired strength, and pain  Prognosis: good    GOALS:  Patient Goal: to decrease pain in left shoulder for return to normal activities    Short Term Goals:  Time Frame: 4 weeks  Pt. Will demo independence with HEP for LUE for improved motion, decreased pain and eventual strength for return to normal routines  Pt. Will demo improved PROM left shoulder for flexion= 95, and ER= 35 for ease with eventual AROM and bathing and dressing  Pt. Will report decreased pain left shoulder to no greater than 5/10 with PROM for ease with HEP and sleeping    Long Term Goals:  Time Frame: 12 weeks  Pt.  Will demo functional AROM left shoulder to be able to reach into first shelf of upper cupboard to obtain lightweight items for cooking  Pt. Will be able to drive using LUE to hold and turn steering wheel  Pt. Will be able to use LUE for gardening tasks with minimal to no pain or difficulty    Patient Education:   []  HEP/Education Completed: Plan of Care, Goals, reviewed Dr. Gary Yeboah protocol with pt. Use of sling at all times except exercises and bath, icing shoulder every hour and after exercises  Medbridge for HEP: pendulums all directions, scap pinches, table slides, AROM elbow, forearm, wrist and hand - handouts given  8/11/22 added leaning over rows to HEP  []  No new Education completed  [x]  Reviewed Prior HEP and added leaning over rows      [x]  Patient verbalized and/or demonstrated understanding of education provided. []  Patient unable to verbalize and/or demonstrate understanding of education provided. Will continue education. [x]  Barriers to learning: none    PLAN:  Treatment Recommendations: Strengthening, Range of Motion, Manual Therapy - Soft Tissue Mobilization, Home Exercise Program, and Self-Care Education and Training per treatment protocol    []  Plan of care initiated. Plan to see patient 2 times per week for 12 weeks to address the treatment planned outlined above.   [x]  Continue with current plan of care  []  Modify plan of care as follows:    []  Hold pending physician visit  []  Discharge    Time In 1615   Time Out 1700   Timed Code Minutes: 45 min   Total Treatment Time: 45 min       Electronically Signed by: HOSEA Carmona/CA [id number]

## 2022-08-16 ENCOUNTER — HOSPITAL ENCOUNTER (OUTPATIENT)
Dept: OCCUPATIONAL THERAPY | Age: 47
Setting detail: THERAPIES SERIES
Discharge: HOME | End: 2022-08-16
Payer: COMMERCIAL

## 2022-08-16 PROCEDURE — 97110 THERAPEUTIC EXERCISES: CPT

## 2022-08-16 NOTE — PROGRESS NOTES
[address]  [] EVALUATION  [x] DAILY NOTE (LAND) [] DAILY NOTE (AQUATIC )   [] PROGRESS NOTE [] DISCHARGE NOTE    [x] OUTPATIENT REHABILITATION [facility]   [] [facility]    [] [facility]   [] Yamila Irizarry    Date: 2022  Patient Name:  Jane Doll  : 1975  MRN: [id number]  CSN: [id number]    Referring Practitioner Hernan Beatty MD   Diagnosis Left Rotator cuff tear   Treatment Diagnosis Decreased ROM, strength, and pain Left UE s/p RCR   Date of Evaluation 22      Functional Outcome Measure Used UEFS   Functional Outcome Score  (22)       Insurance: Primary: Payor: UMR /  /  / ,   Secondary:    Authorization Information: No precert needed, 60 vs. Combined of OT/PT/ST   Visit # 7, 7/10 for progress note   Visits Allowed:    Recertification Date: Oct. 10, 2022   Physician Follow-Up: 2022   Physician Orders: Use type I RCR protocol, no bicep resistance x6 weeks   Pertinent History: Pt. Was here for OT for left shoulder pain earlier this year but did not have a significant improvement. Pt. Then had further testing which showed a rotator cuff tear. Pt. Underwent surgery 7/15/22 for RCR and Bicep tenodesis. SUBJECTIVE: Patient is pleasant and cooperative, relates she is having some soreness today. Reports she has lots of soreness following treatment sessions. OBJECTIVE:  TREATMENT   Precautions:  Follow Dr. Caitlin Montes Type 1 RCR protocol with     Pain: 2/10- reference bicep and upper trap     X in shaded column indicates Activity Completed Today   Modalities Parameters/  Location  Notes/Comments                     Manual Therapy Time/  Technique  Notes/Comments   PROM left shoulder for flexion, ER in scaption, IR in scaption within protocol  x Allowed IR/ER to 45 degrees at 90 and flexion to 105   Sidelying STM to upper trap; STM around medial border of scapula and bicep  x Used biofreeze on upper trap and bicep today   Sidelying scap mobs with focus on inferior glide   x    Exercises   Sets/  Sec Reps  Notes/Comments   AAROM left elbow; AROM forearm, wrist and hand 1 10 ea x    Pendulums all directions 1 10 ea x    Scapular pinches and depression in sidelying  1 10 x    Table slides 1 10 x    Pulleys for shoulder flexion to tolerance 1 10 x    Supine dowel ER; supine dowel for chest press  1 10 x    Submax isometrics all directions in supine 5 sec 5 ea x    Leaning over rows LUE 1 10 x Initiated today with good tolerance   Sidelying ER with elbow at side 1 10 x Initiated today with cues for technique          Activities Time    Notes/Comments   Adjusted sling for proper fit      Removed pain pump and changed post op bandages              Specific Interventions Next Treatment: Per Dr. Tim Purcell type 1 RCR protocol    Activity/Treatment Tolerance:  [x]  Patient tolerated treatment well  []  Patient limited by fatigue  []  Patient limited by pain   []  Patient limited by other medical complications  []  Other:     Assessment: Patient progressing towards goals. Patient is on track with protocol for RCR. Is having some increased swelling in anterior shoulder and min complaints of \"catching. \" Encouraged ice and gentle scar massage. Areas for Improvement: impaired ROM, impaired strength, and pain  Prognosis: good    GOALS:  Patient Goal: to decrease pain in left shoulder for return to normal activities    Short Term Goals:  Time Frame: 4 weeks  Pt. Will demo independence with HEP for LUE for improved motion, decreased pain and eventual strength for return to normal routines  Pt. Will demo improved PROM left shoulder for flexion= 95, and ER= 35 for ease with eventual AROM and bathing and dressing  Pt. Will report decreased pain left shoulder to no greater than 5/10 with PROM for ease with HEP and sleeping    Long Term Goals:  Time Frame: 12 weeks  Pt.  Will demo functional AROM left shoulder to be able to reach into first shelf of upper cupboard to obtain lightweight items for cooking  Pt. Will be able to drive using LUE to hold and turn steering wheel  Pt. Will be able to use LUE for gardening tasks with minimal to no pain or difficulty    Patient Education:   []  HEP/Education Completed: Plan of Care, Goals, reviewed Dr. Jung Ground protocol with pt. Use of sling at all times except exercises and bath, icing shoulder every hour and after exercises  Medbridge for HEP: pendulums all directions, scap pinches, table slides, AROM elbow, forearm, wrist and hand - handouts given  8/11/22 added leaning over rows to HEP  []  No new Education completed  [x]  Reviewed Prior HEP and added leaning over rows      [x]  Patient verbalized and/or demonstrated understanding of education provided. []  Patient unable to verbalize and/or demonstrate understanding of education provided. Will continue education. [x]  Barriers to learning: none    PLAN:  Treatment Recommendations: Strengthening, Range of Motion, Manual Therapy - Soft Tissue Mobilization, Home Exercise Program, and Self-Care Education and Training per treatment protocol    []  Plan of care initiated. Plan to see patient 2 times per week for 12 weeks to address the treatment planned outlined above.   [x]  Continue with current plan of care  []  Modify plan of care as follows:    []  Hold pending physician visit  []  Discharge    Time In 1615   Time Out 1700   Timed Code Minutes: 45 min   Total Treatment Time: 45 min       JAMES GRIER/CA #[id number]

## 2022-08-18 ENCOUNTER — HOSPITAL ENCOUNTER (OUTPATIENT)
Dept: OCCUPATIONAL THERAPY | Age: 47
Setting detail: THERAPIES SERIES
Discharge: HOME | End: 2022-08-18
Payer: COMMERCIAL

## 2022-08-18 PROCEDURE — 97110 THERAPEUTIC EXERCISES: CPT

## 2022-08-18 PROCEDURE — 97140 MANUAL THERAPY 1/> REGIONS: CPT

## 2022-08-18 NOTE — PROGRESS NOTES
[address]  [] EVALUATION  [] DAILY NOTE (LAND) [] DAILY NOTE (AQUATIC )   [x] PROGRESS NOTE [] DISCHARGE NOTE    [x] OUTPATIENT REHABILITATION [facility]   [] [place] [address]    [] [facility]   [] Dena Erps    Date: 2022  Patient Name:  Per Bro  : 1975  MRN: [id number]  CSN: [id number]    Referring Practitioner Cindy Ng MD   Diagnosis Left Rotator cuff tear   Treatment Diagnosis Decreased ROM, strength, and pain Left UE s/p RCR   Date of Evaluation 22      Functional Outcome Measure Used UEFS   Functional Outcome Score  (22)       Insurance: Primary: Payor: UMR /  /  / ,   Secondary:    Authorization Information: No precert needed, 60 vs. Combined of OT/PT/ST   Visit # 8, (progress note 22)   Visits Allowed: 20   Recertification Date: Oct. 10, 2022   Physician Follow-Up: Aug. 22, 2022   Physician Orders: Use type I RCR protocol, no bicep resistance x6 weeks   Pertinent History: Pt. Was here for OT for left shoulder pain earlier this year but did not have a significant improvement. Pt. Then had further testing which showed a rotator cuff tear. Pt. Underwent surgery 7/15/22 for RCR and Bicep tenodesis. SUBJECTIVE: Patient reports increase pain in left shoulder today, Reports increased discomfort in anterior shoulder and right underneath incision site on front of shoulder     OBJECTIVE:  TREATMENT   Precautions:  Follow Dr. Gilman Lanes Type 1 RCR protocol with     Pain: 6/10- anterior shoulder     X in shaded column indicates Activity Completed Today   Modalities Parameters/  Location  Notes/Comments                     Manual Therapy Time/  Technique  Notes/Comments   PROM left shoulder for flexion, ER in scaption, IR in scaption within protocol  xx Allowed IR/ER to 45 degrees at 90 and flexion to 125   Sidelying STM to upper trap and around medial border of scapula; STM/cross friction massage to anterior shoulder in supine  xx    Sidelying scap mobs with focus on inferior glide   xx    Exercises   Sets/  Sec Reps  Notes/Comments   AAROM left elbow; AROM forearm, wrist and hand 1 10 ea     Pendulums all directions 1 10 ea xx    Scapular pinches and depression in sidelying  1 10 xx    Table slides 1 10 xx    Pulleys for shoulder flexion to tolerance 1 10 xx    Supine dowel ER; supine dowel for chest press, and shoulder flexion from 90 1 10 ea xx    Submax isometrics all directions in supine 5 sec 5 ea     Leaning over rows LUE, and leaning over abduction - no weight 1 10 ea xx    Sidelying ER with elbow at side no weight 1 10 xx           Activities Time    Notes/Comments   Adjusted sling for proper fit      Removed pain pump and changed post op bandages              Specific Interventions Next Treatment: Per Dr. George Ruiz type 1 RCR protocol    Activity/Treatment Tolerance:  [x]  Patient tolerated treatment well  []  Patient limited by fatigue  []  Patient limited by pain   []  Patient limited by other medical complications  []  Other:     Assessment: Patient has been seen x4 weeks in OT following a Type I RCR with BT on 7/15/22. Pt. Is progressing with PROM of shoulder however is reporting continued pain in anterior shoulder stating that today is very painful. Due to this, no isotonic exercises have been added as allowed in protocol. Pt. Reporting 6/10 pain in anterior shoulder at rest. Pt. Is independent with HEP. Pt. Consistently attends her therapy appointments. Pt. Reports independence with bathing and dressing but it takes a little longer than normal. Pt. To have follow up with Dr. Delvin Arriaga office on Monday. Will continue with current POC per Dr. Hernán Cohen with BT protocol.   Areas for Improvement: impaired ROM, impaired strength, and pain  Prognosis: good    GOALS:  Patient Goal: to decrease pain in left shoulder for return to normal activities    Short Term Goals:  Time Frame: 4 weeks  Pt. Will demo independence with HEP for LUE for improved motion, decreased pain and eventual strength for return to normal routines - GOAL MET 8/18/22 WITH PT. INDEPENDENT WITH PENDULUMS, TABLE SLIDES, SCAPULAR EXERCISES, SUPINE DOWEL FOR CHEST PRESS AND ER, AND LEANING OVER/PRONE ROWS  Continue goal with upgrades per protocol as pt. Can tolerate    Pt. Will demo improved PROM left shoulder for flexion= 95, and ER= 35 for ease with eventual AROM and bathing and dressing - GOAL MET 8/18/22 WITH PASSIVE LEFT SHOULDER FLEXION = 120, ER AT 90= 38, IR AT 90= 45, AND ABD= 90  Revised goal: Pt. Will demo improved PROM left shoulder to flexion= 145, ER = 60 at 45 degrees abd, IR 60 at 45 degrees abd, and ABD= 120 for ease with AROM for bathing and dressing    Pt. Will report decreased pain left shoulder to no greater than 5/10 with PROM for ease with HEP and sleeping - GOAL NOT MET 8/18/22 WITH PT. REPORTING 6/10 PAIN UPON ARRIVAL TO THERAPY WITHOUT ANY MOVEMENT. PT. HAS BEEN REPORTING INCREASED ANTERIOR SHOULDER PAIN AND HAS BEEN REPORTING THAT THIS IS WORSENING AND SO DUE TO THIS, NO RESISTIVE ACTIVITIES HAVE BEEN ADDED TO PT'S THERAPY SESSION OR HEP. Long Term Goals:  Time Frame: 12 weeks  Pt. Will demo functional AROM left shoulder to be able to reach into first shelf of upper cupboard to obtain lightweight items for cooking - NOT MET - PT. 5 WEEKS POST OP  Pt. Will be able to drive using LUE to hold and turn steering wheel - NOT MET- PT. 5 WEEKS POST OP  Pt. Will be able to use LUE for gardening tasks with minimal to no pain or difficulty NOT MET - 5 WEEKS POST OP    Patient Education:   []  HEP/Education Completed: Plan of Care, Goals, reviewed Dr. Krystle Briseno protocol with pt.  Use of sling at all times except exercises and bath, icing shoulder every hour and after exercises  Medbridge for HEP: pendulums all directions, scap pinches, table slides, AROM elbow, forearm, wrist and hand - handouts given  8/11/22 added leaning over rows, supine dowel for chest press, ER to HEP    []  No new Education completed  [x]  Reviewed Prior HEP and discussed protocol and goals     [x]  Patient verbalized and/or demonstrated understanding of education provided. []  Patient unable to verbalize and/or demonstrate understanding of education provided. Will continue education. [x]  Barriers to learning: none    PLAN:  Treatment Recommendations: Strengthening, Range of Motion, Manual Therapy - Soft Tissue Mobilization, Home Exercise Program, and Self-Care Education and Training per treatment protocol    []  Plan of care initiated. Plan to see patient 2 times per week for 12 weeks to address the treatment planned outlined above.   [x]  Continue with current plan of care  []  Modify plan of care as follows:    []  Hold pending physician visit  []  Discharge    Time In 1615   Time Out 1700   Timed Code Minutes: 45 min   Total Treatment Time: 45 min       Alycia 70, OTR/L [phone]

## 2022-08-23 ENCOUNTER — HOSPITAL ENCOUNTER (OUTPATIENT)
Dept: OCCUPATIONAL THERAPY | Age: 47
Setting detail: THERAPIES SERIES
Discharge: HOME | End: 2022-08-23
Payer: COMMERCIAL

## 2022-08-23 PROCEDURE — 97110 THERAPEUTIC EXERCISES: CPT

## 2022-08-23 PROCEDURE — 97140 MANUAL THERAPY 1/> REGIONS: CPT

## 2022-08-23 NOTE — PROGRESS NOTES
[address]  [] EVALUATION  [x] DAILY NOTE (LAND) [] DAILY NOTE (AQUATIC )   [] PROGRESS NOTE [] DISCHARGE NOTE    [x] OUTPATIENT REHABILITATION CENTER [facility]   [] [place] [address]    [] [facility]   [] Shashi Hazard    Date: 2022  Patient Name:  Sonja Ny  : 1975  MRN: [id number]  CSN: [id number]    Referring Practitioner Ishmael Spencer MD   Diagnosis Left Rotator cuff tear   Treatment Diagnosis Decreased ROM, strength, and pain Left UE s/p RCR   Date of Evaluation 22      Functional Outcome Measure Used UEFS   Functional Outcome Score  (22)       Insurance: Primary: Payor: UMR /  /  / ,   Secondary:    Authorization Information: No precert needed, 60 vs. Combined of OT/PT/ST   Visit # 9, 1/10 for PN; (progress note 22)   Visits Allowed: 20   Recertification Date: Oct. 10, 2022   Physician Follow-Up:    Physician Orders: Use type I RCR protocol, no bicep resistance x6 weeks   Pertinent History: Pt. Was here for OT for left shoulder pain earlier this year but did not have a significant improvement. Pt. Then had further testing which showed a rotator cuff tear. Pt. Underwent surgery 7/15/22 for RCR and Bicep tenodesis. SUBJECTIVE: Patient saw MD yesterday, reports can discharge sling at rest or home, continue to wear out in public for protection. Patient reports she is not able to start weights yet, reports she needs to have full motion before can begin weight strengthening. Patient trial without sling during her day at home, going well so far. Patient reports MD removed the pain pills and had her transition to mobic. MD saw ROM, was pleased with progress. OBJECTIVE:  TREATMENT   Precautions:  Follow Dr. Nikky Maldonado Type 1 RCR protocol with     Pain: 1/10- anterior shoulder     X in shaded column indicates Activity Completed Today   Modalities Parameters/  Location  Notes/Comments Manual Therapy Time/  Technique  Notes/Comments   PROM left shoulder for flexion, ER in scaption, IR in scaption within protocol  x Allowed IR/ER to 45 degrees at 90 and flexion to 125   Sidelying STM to upper trap and around medial border of scapula; STM/cross friction massage to anterior shoulder in supine  x    Sidelying scap mobs with focus on inferior glide   x    Exercises   Sets/  Sec Reps  Notes/Comments   AAROM left elbow; AROM forearm, wrist and hand 1 10 ea     Pendulums all directions 1 10 ea x    Scapular pinches and depression in sidelying  1 10 x    Table slides 1 10 x    Pulleys for shoulder flexion to tolerance 1 10 x    Supine dowel ER; supine dowel for chest press, and shoulder flexion from 90 1 10 ea x Hold at end range flexion 5 sec for last 5 reps, good tolerance and stretch noted    Submax isometrics all directions in supine 5 sec 5 ea     Leaning over rows LUE, and leaning over abduction - no weight 1 10 ea x    Sidelying ER with elbow at side no weight 1 10 x    Supine therapist guided for support and gentle distraction, flexion 90 to gentle tolerance  1 5 x Initiated this date. Good tolerance, guarded at end range but improvement from cues and support from therapist           Activities Time    Notes/Comments   Adjusted sling for proper fit      Removed pain pump and changed post op bandages              Specific Interventions Next Treatment: Per Dr. Consuelo Sabillon type 1 RCR protocol    Activity/Treatment Tolerance:  [x]  Patient tolerated treatment well  []  Patient limited by fatigue  []  Patient limited by pain   []  Patient limited by other medical complications  []  Other:     Assessment: Patient progressing towards goals. Will be 6 week post op on Friday. Decreased pain noted upon arrival, continued with AROM in supine per protocol with fair tolerance. Patient to be OOT for 1 week,will return on 8/30. Reviewed HEP to be completed.     Areas for Improvement: impaired ROM, impaired strength, and pain  Prognosis: good    GOALS:  Patient Goal: to decrease pain in left shoulder for return to normal activities    Short Term Goals:  Time Frame: 4 weeks  Pt. Will demo independence with HEP for LUE for improved motion, decreased pain and eventual strength for return to normal routines - GOAL MET 8/18/22 WITH PT. INDEPENDENT WITH PENDULUMS, TABLE SLIDES, SCAPULAR EXERCISES, SUPINE DOWEL FOR CHEST PRESS AND ER, AND LEANING OVER/PRONE ROWS  Continue goal with upgrades per protocol as pt. Can tolerate    Pt. Will demo improved PROM left shoulder for flexion= 95, and ER= 35 for ease with eventual AROM and bathing and dressing - GOAL MET 8/18/22 WITH PASSIVE LEFT SHOULDER FLEXION = 120, ER AT 90= 38, IR AT 90= 45, AND ABD= 90  Revised goal: Pt. Will demo improved PROM left shoulder to flexion= 145, ER = 60 at 45 degrees abd, IR 60 at 45 degrees abd, and ABD= 120 for ease with AROM for bathing and dressing    Pt. Will report decreased pain left shoulder to no greater than 5/10 with PROM for ease with HEP and sleeping - GOAL NOT MET 8/18/22 WITH PT. REPORTING 6/10 PAIN UPON ARRIVAL TO THERAPY WITHOUT ANY MOVEMENT. PT. HAS BEEN REPORTING INCREASED ANTERIOR SHOULDER PAIN AND HAS BEEN REPORTING THAT THIS IS WORSENING AND SO DUE TO THIS, NO RESISTIVE ACTIVITIES HAVE BEEN ADDED TO PT'S THERAPY SESSION OR HEP. Long Term Goals:  Time Frame: 12 weeks  Pt. Will demo functional AROM left shoulder to be able to reach into first shelf of upper cupboard to obtain lightweight items for cooking - NOT MET - PT. 5 WEEKS POST OP  Pt. Will be able to drive using LUE to hold and turn steering wheel - NOT MET- PT. 5 WEEKS POST OP  Pt. Will be able to use LUE for gardening tasks with minimal to no pain or difficulty NOT MET - 5 WEEKS POST OP    Patient Education:   []  HEP/Education Completed: Plan of Care, Goals, reviewed Dr. Colbert Cushing protocol with pt.  Use of sling at all times except exercises and bath, icing shoulder every hour and after exercises  Medbridge for HEP: pendulums all directions, scap pinches, table slides, AROM elbow, forearm, wrist and hand - handouts given  8/11/22 added leaning over rows, supine dowel for chest press, ER to HEP    []  No new Education completed  [x]  Reviewed Prior HEP and discussed protocol     [x]  Patient verbalized and/or demonstrated understanding of education provided. []  Patient unable to verbalize and/or demonstrate understanding of education provided. Will continue education. [x]  Barriers to learning: none    PLAN:  Treatment Recommendations: Strengthening, Range of Motion, Manual Therapy - Soft Tissue Mobilization, Home Exercise Program, and Self-Care Education and Training per treatment protocol    []  Plan of care initiated. Plan to see patient 2 times per week for 12 weeks to address the treatment planned outlined above.   [x]  Continue with current plan of care  []  Modify plan of care as follows:    []  Hold pending physician visit  []  Discharge    Time In 1615   Time Out 1658   Timed Code Minutes: 43 min   Total Treatment Time: 43 min       Ro MARTINS #[id number]

## 2022-08-25 ENCOUNTER — APPOINTMENT (OUTPATIENT)
Dept: OCCUPATIONAL THERAPY | Age: 47
End: 2022-08-25
Payer: COMMERCIAL

## 2022-08-30 ENCOUNTER — HOSPITAL ENCOUNTER (OUTPATIENT)
Dept: OCCUPATIONAL THERAPY | Age: 47
Setting detail: THERAPIES SERIES
Discharge: HOME | End: 2022-08-30
Payer: COMMERCIAL

## 2022-08-30 PROCEDURE — 97110 THERAPEUTIC EXERCISES: CPT

## 2022-08-30 PROCEDURE — 97140 MANUAL THERAPY 1/> REGIONS: CPT

## 2022-08-30 NOTE — PROGRESS NOTES
[address]  [] EVALUATION  [x] DAILY NOTE (LAND) [] DAILY NOTE (AQUATIC )   [] PROGRESS NOTE [] DISCHARGE NOTE    [x] OUTPATIENT REHABILITATION CENTER [facility]   [] [place] [address]    [] [facility]   [] Mary Steins    Date: 2022  Patient Name:  Carlos Lanier  : 1975  MRN: [id number]  CSN: [id number]    Referring Practitioner Jerzy Siu MD   Diagnosis Left Rotator cuff tear   Treatment Diagnosis Decreased ROM, strength, and pain Left UE s/p RCR   Date of Evaluation 22      Functional Outcome Measure Used UEFS   Functional Outcome Score  (22)       Insurance: Primary: Payor: UMR /  /  / ,   Secondary:    Authorization Information: No precert needed, 60 vs. Combined of OT/PT/ST   Visit # 10, 2/10 for PN; (progress note 22)   Visits Allowed: 20   Recertification Date: Oct. 10, 2022   Physician Follow-Up:    Physician Orders: Use type I RCR protocol, no bicep resistance x6 weeks   Pertinent History: Pt. Was here for OT for left shoulder pain earlier this year but did not have a significant improvement. Pt. Then had further testing which showed a rotator cuff tear. Pt. Underwent surgery 7/15/22 for RCR and Bicep tenodesis. SUBJECTIVE: Patient reports mild pain in left anterior shoulder today. States doctor told her this is still healing and is normal.     OBJECTIVE:  TREATMENT   Precautions:  Follow Dr. Eulalia Matt Type 1 RCR protocol with     Pain: 3/10- anterior shoulder     X in shaded column indicates Activity Completed Today   Modalities Parameters/  Location  Notes/Comments                     Manual Therapy Time/  Technique  Notes/Comments   PROM left shoulder all planes to tolerance  xx    Sidelying STM to upper trap and around medial border of scapula; STM/cross friction massage to anterior shoulder in supine      Sidelying scap mobs with focus on inferior glide       Exercises SCAPULAR EXERCISES, SUPINE DOWEL FOR CHEST PRESS AND ER, AND LEANING OVER/PRONE ROWS  Continue goal with upgrades per protocol as pt. Can tolerate    Pt. Will demo improved PROM left shoulder for flexion= 95, and ER= 35 for ease with eventual AROM and bathing and dressing - GOAL MET 8/18/22 WITH PASSIVE LEFT SHOULDER FLEXION = 120, ER AT 90= 38, IR AT 90= 45, AND ABD= 90  Revised goal: Pt. Will demo improved PROM left shoulder to flexion= 145, ER = 60 at 45 degrees abd, IR 60 at 45 degrees abd, and ABD= 120 for ease with AROM for bathing and dressing    Pt. Will report decreased pain left shoulder to no greater than 5/10 with PROM for ease with HEP and sleeping - GOAL NOT MET 8/18/22 WITH PT. REPORTING 6/10 PAIN UPON ARRIVAL TO THERAPY WITHOUT ANY MOVEMENT. PT. HAS BEEN REPORTING INCREASED ANTERIOR SHOULDER PAIN AND HAS BEEN REPORTING THAT THIS IS WORSENING AND SO DUE TO THIS, NO RESISTIVE ACTIVITIES HAVE BEEN ADDED TO PT'S THERAPY SESSION OR HEP. Long Term Goals:  Time Frame: 12 weeks  Pt. Will demo functional AROM left shoulder to be able to reach into first shelf of upper cupboard to obtain lightweight items for cooking - NOT MET - PT. 5 WEEKS POST OP  Pt. Will be able to drive using LUE to hold and turn steering wheel - NOT MET- PT. 5 WEEKS POST OP  Pt. Will be able to use LUE for gardening tasks with minimal to no pain or difficulty NOT MET - 5 WEEKS POST OP    Patient Education:   []  HEP/Education Completed: Plan of Care, Goals, reviewed Dr. Lo Sic protocol with pt.  Use of sling at all times except exercises and bath, icing shoulder every hour and after exercises  Medbridge for HEP: pendulums all directions, scap pinches, table slides, AROM elbow, forearm, wrist and hand - handouts given  8/11/22 added leaning over rows, supine dowel for chest press, ER to HEP    []  No new Education completed  [x]  Reviewed Prior HEP and discussed protocol     [x]  Patient verbalized and/or demonstrated understanding of education provided. []  Patient unable to verbalize and/or demonstrate understanding of education provided. Will continue education. [x]  Barriers to learning: none    PLAN:  Treatment Recommendations: Strengthening, Range of Motion, Manual Therapy - Soft Tissue Mobilization, Home Exercise Program, and Self-Care Education and Training per treatment protocol    []  Plan of care initiated. Plan to see patient 2 times per week for 12 weeks to address the treatment planned outlined above.   [x]  Continue with current plan of care  []  Modify plan of care as follows:    []  Hold pending physician visit  []  Discharge    Time In 1615   Time Out 1700   Timed Code Minutes: 45 min   Total Treatment Time: 45 min       Alycia 70, OTR/L [phone]

## 2022-09-02 ENCOUNTER — HOSPITAL ENCOUNTER (OUTPATIENT)
Dept: OCCUPATIONAL THERAPY | Age: 47
Setting detail: THERAPIES SERIES
Discharge: HOME | End: 2022-09-02
Payer: COMMERCIAL

## 2022-09-02 PROCEDURE — 97110 THERAPEUTIC EXERCISES: CPT

## 2022-09-02 NOTE — PROGRESS NOTES
[address]  [] EVALUATION  [x] DAILY NOTE (LAND) [] DAILY NOTE (AQUATIC )   [] PROGRESS NOTE [] DISCHARGE NOTE    [x] OUTPATIENT REHABILITATION CENTER [facility]   [] [place] [address]    [] [facility]   [] [facility]    Date: 2022  Patient Name:  Mildred Tilley  : 1975  MRN: [id number]  CSN: [id number]    Referring Practitioner Mirza Ferrari MD   Diagnosis Left Rotator cuff tear   Treatment Diagnosis Decreased ROM, strength, and pain Left UE s/p RCR   Date of Evaluation 22      Functional Outcome Measure Used UEFS   Functional Outcome Score  (22)       Insurance: Primary: Payor: UMR /  /  / ,   Secondary:    Authorization Information: No precert needed, 60 vs. Combined of OT/PT/ST   Visit # 11, 3/10 for PN; (progress note 22)   Visits Allowed: 20   Recertification Date: Oct. 10, 2022   Physician Follow-Up:    Physician Orders: Use type I RCR protocol, no bicep resistance x6 weeks   Pertinent History: Pt. Was here for OT for left shoulder pain earlier this year but did not have a significant improvement. Pt. Then had further testing which showed a rotator cuff tear. Pt. Underwent surgery 7/15/22 for RCR and Bicep tenodesis. SUBJECTIVE: Patient reports her shoulder pain seems to increase later in the evening. Exercises are going fine at home. OBJECTIVE:  TREATMENT   Precautions:  Follow Dr. Tim Purcell Type 1 RCR protocol with     Pain: 2/10- anterior shoulder     X in shaded column indicates Activity Completed Today   Modalities Parameters/  Location  Notes/Comments                     Manual Therapy Time/  Technique  Notes/Comments   PROM left shoulder all planes to tolerance  xx    Sidelying STM to upper trap and around medial border of scapula; STM/cross friction massage to anterior shoulder in supine      Sidelying scap mobs with focus on inferior glide       Exercises   Sets/  Sec Reps Notes/Comments   AAROM left elbow; AROM forearm, wrist and hand 1 10 ea     Pendulums all directions 1 10 ea xx    Scapular pinches and depression in sidelying  1 10 xx    Table slides 1 10     Pulleys for shoulder flexion to tolerance 1 10 xx    Supine dowel ER; supine dowel for chest press, and shoulder flexion from 90 with left hand in neutral 1 10 ea xx    Supine ceiling circles fwd/bwd 1 10     Submax isometrics all directions in standing 5 sec 5 ea xx    Leaning over rows LUE, and leaning over abduction - no weight 1 10 ea xx    Sidelying ER with elbow at side no weight, abduction no weight  1 10 ea xx Contact guard support for abduction - good tolerance   Supine active flexion from side to tolerance with CGA 1 5 xx           Activities Time    Notes/Comments   Adjusted sling for proper fit      Removed pain pump and changed post op bandages              Specific Interventions Next Treatment: Per Dr. Willy Singletary type 1 RCR protocol    Activity/Treatment Tolerance:  [x]  Patient tolerated treatment well  []  Patient limited by fatigue  []  Patient limited by pain   []  Patient limited by other medical complications  []  Other:     Assessment: Patient progressing towards goals. Demonstrates good follow through with HEP. Areas for Improvement: impaired ROM, impaired strength, and pain  Prognosis: good    GOALS:  Patient Goal: to decrease pain in left shoulder for return to normal activities    Short Term Goals:  Time Frame: 4 weeks  Pt. Will demo independence with HEP for LUE for improved motion, decreased pain and eventual strength for return to normal routines - GOAL MET 8/18/22 WITH PT. INDEPENDENT WITH PENDULUMS, TABLE SLIDES, SCAPULAR EXERCISES, SUPINE DOWEL FOR CHEST PRESS AND ER, AND LEANING OVER/PRONE ROWS  Continue goal with upgrades per protocol as pt. Can tolerate    Pt.  Will demo improved PROM left shoulder for flexion= 95, and ER= 35 for ease with eventual AROM and bathing and dressing - GOAL MET 8/18/22 WITH PASSIVE LEFT SHOULDER FLEXION = 120, ER AT 90= 38, IR AT 90= 45, AND ABD= 90  Revised goal: Pt. Will demo improved PROM left shoulder to flexion= 145, ER = 60 at 45 degrees abd, IR 60 at 45 degrees abd, and ABD= 120 for ease with AROM for bathing and dressing    Pt. Will report decreased pain left shoulder to no greater than 5/10 with PROM for ease with HEP and sleeping - GOAL NOT MET 8/18/22 WITH PT. REPORTING 6/10 PAIN UPON ARRIVAL TO THERAPY WITHOUT ANY MOVEMENT. PT. HAS BEEN REPORTING INCREASED ANTERIOR SHOULDER PAIN AND HAS BEEN REPORTING THAT THIS IS WORSENING AND SO DUE TO THIS, NO RESISTIVE ACTIVITIES HAVE BEEN ADDED TO PT'S THERAPY SESSION OR HEP. Long Term Goals:  Time Frame: 12 weeks  Pt. Will demo functional AROM left shoulder to be able to reach into first shelf of upper cupboard to obtain lightweight items for cooking - NOT MET - PT. 5 WEEKS POST OP  Pt. Will be able to drive using LUE to hold and turn steering wheel - NOT MET- PT. 5 WEEKS POST OP  Pt. Will be able to use LUE for gardening tasks with minimal to no pain or difficulty NOT MET - 5 WEEKS POST OP    Patient Education:   [x]  HEP/Education Completed: Discussed sling wear as physician ok'd weaning out of it. Ok'd to take out abduction pillow and advised again against no lifting. Medbridge for HEP: pendulums all directions, scap pinches, table slides, AROM elbow, forearm, wrist and hand - handouts given  8/11/22 added leaning over rows, supine dowel for chest press, ER to HEP    []  No new Education completed  [x]  Reviewed Prior HEP and discussed protocol     [x]  Patient verbalized and/or demonstrated understanding of education provided. []  Patient unable to verbalize and/or demonstrate understanding of education provided. Will continue education.   [x]  Barriers to learning: none    PLAN:  Treatment Recommendations: Strengthening, Range of Motion, Manual Therapy - Soft Tissue Mobilization, Home Exercise Program, and Self-Care Education and Training per treatment protocol    []  Plan of care initiated. Plan to see patient 2 times per week for 12 weeks to address the treatment planned outlined above.   [x]  Continue with current plan of care  []  Modify plan of care as follows:    []  Hold pending physician visit  []  Discharge    Time In 1600   Time Out 1635   Timed Code Minutes: 35 min   Total Treatment Time: 35 min       JAMES GRIER/[place] #[id number]

## 2022-09-07 ENCOUNTER — HOSPITAL ENCOUNTER (OUTPATIENT)
Dept: OCCUPATIONAL THERAPY | Age: 47
Setting detail: THERAPIES SERIES
Discharge: HOME | End: 2022-09-07
Payer: COMMERCIAL

## 2022-09-07 PROCEDURE — 97140 MANUAL THERAPY 1/> REGIONS: CPT

## 2022-09-07 PROCEDURE — 97110 THERAPEUTIC EXERCISES: CPT

## 2022-09-07 NOTE — PROGRESS NOTES
[address]  [] EVALUATION  [x] DAILY NOTE (LAND) [] DAILY NOTE (AQUATIC )   [] PROGRESS NOTE [] DISCHARGE NOTE    [x] OUTPATIENT REHABILITATION [facility]   [] [place] [address]    [] [facility]   [] Mary Rehman    Date: 2022  Patient Name:  Jacob Majano  : 1975  MRN: [id number]  CSN: [id number]    Referring Practitioner Willow Manzanares MD   Diagnosis Left Rotator cuff tear   Treatment Diagnosis Decreased ROM, strength, and pain Left UE s/p RCR   Date of Evaluation 22      Functional Outcome Measure Used UEFS   Functional Outcome Score  (22)       Insurance: Primary: Payor: UMR /  /  / ,   Secondary:    Authorization Information: No precert needed, 60 vs. Combined of OT/PT/ST   Visit # 12, 4/10 for PN; (progress note 22)   Visits Allowed: 20   Recertification Date: Oct. 10, 2022   Physician Follow-Up:    Physician Orders: Use type I RCR protocol, no bicep resistance x6 weeks   Pertinent History: Pt. Was here for OT for left shoulder pain earlier this year but did not have a significant improvement. Pt. Then had further testing which showed a rotator cuff tear. Pt. Underwent surgery 7/15/22 for RCR and Bicep tenodesis. SUBJECTIVE: Patient reports shoulder pain continues to be worst in the evening and throughout the night. Patient reports sharp pain is present, moving wrong or over using her arm with reaching. OBJECTIVE:  TREATMENT   Precautions:  Follow Dr. Jackelin Celestin Type 1 RCR protocol with     Pain: 210- anterior shoulder     X in shaded column indicates Activity Completed Today   Modalities Parameters/  Location  Notes/Comments                     Manual Therapy Time/  Technique  Notes/Comments   PROM left shoulder all planes to tolerance  x    Sidelying STM to upper trap and around medial border of scapula; STM/cross friction massage to anterior shoulder in supine      Sidelying PENDULUMS, TABLE SLIDES, SCAPULAR EXERCISES, SUPINE DOWEL FOR CHEST PRESS AND ER, AND LEANING OVER/PRONE ROWS  Continue goal with upgrades per protocol as pt. Can tolerate    Pt. Will demo improved PROM left shoulder for flexion= 95, and ER= 35 for ease with eventual AROM and bathing and dressing - GOAL MET 8/18/22 WITH PASSIVE LEFT SHOULDER FLEXION = 120, ER AT 90= 38, IR AT 90= 45, AND ABD= 90  Revised goal: Pt. Will demo improved PROM left shoulder to flexion= 145, ER = 60 at 45 degrees abd, IR 60 at 45 degrees abd, and ABD= 120 for ease with AROM for bathing and dressing    Pt. Will report decreased pain left shoulder to no greater than 5/10 with PROM for ease with HEP and sleeping - GOAL NOT MET 8/18/22 WITH PT. REPORTING 6/10 PAIN UPON ARRIVAL TO THERAPY WITHOUT ANY MOVEMENT. PT. HAS BEEN REPORTING INCREASED ANTERIOR SHOULDER PAIN AND HAS BEEN REPORTING THAT THIS IS WORSENING AND SO DUE TO THIS, NO RESISTIVE ACTIVITIES HAVE BEEN ADDED TO PT'S THERAPY SESSION OR HEP. Long Term Goals:  Time Frame: 12 weeks  Pt. Will demo functional AROM left shoulder to be able to reach into first shelf of upper cupboard to obtain lightweight items for cooking - NOT MET - PT. 5 WEEKS POST OP  Pt. Will be able to drive using LUE to hold and turn steering wheel - NOT MET- PT. 5 WEEKS POST OP  Pt. Will be able to use LUE for gardening tasks with minimal to no pain or difficulty NOT MET - 5 WEEKS POST OP    Patient Education:   []  HEP/Education Completed: Discussed sling wear as physician ok'd weaning out of it. Ok'd to take out abduction pillow and advised again against no lifting. Medbridge for HEP: pendulums all directions, scap pinches, table slides, AROM elbow, forearm, wrist and hand - handouts given  8/11/22 added leaning over rows, supine dowel for chest press, ER to HEP    []  No new Education completed  [x]  Reviewed Prior HEP new schedule.  Reviewed protocol progressions    [x]  Patient verbalized and/or demonstrated understanding of education provided. []  Patient unable to verbalize and/or demonstrate understanding of education provided. Will continue education. [x]  Barriers to learning: none    PLAN:  Treatment Recommendations: Strengthening, Range of Motion, Manual Therapy - Soft Tissue Mobilization, Home Exercise Program, and Self-Care Education and Training per treatment protocol    []  Plan of care initiated. Plan to see patient 2 times per week for 12 weeks to address the treatment planned outlined above.   [x]  Continue with current plan of care  []  Modify plan of care as follows:    []  Hold pending physician visit  []  Discharge    Time In 1600   Time Out 1632   Timed Code Minutes: 32 min   Total Treatment Time: 32 min       Víctor GRIER/CA #[id number]

## 2022-09-13 ENCOUNTER — HOSPITAL ENCOUNTER (OUTPATIENT)
Dept: OCCUPATIONAL THERAPY | Age: 47
Setting detail: THERAPIES SERIES
Discharge: HOME | End: 2022-09-13
Payer: COMMERCIAL

## 2022-09-13 PROCEDURE — 97140 MANUAL THERAPY 1/> REGIONS: CPT

## 2022-09-13 PROCEDURE — 97110 THERAPEUTIC EXERCISES: CPT

## 2022-09-13 NOTE — PROGRESS NOTES
scap, medial scap border, subscapularis, pec minor,VERY gently cross friction on bicep  x    Gentle scapular and GH mobilizations  x    Exercises   Sets/  Sec Reps  Notes/Comments   AAROM left elbow; AROM forearm, wrist and hand 1 10 ea     Pendulums all directions 1 10 ea     Scapular pinches and depression in sidelying  1 10 ea x    Table slides 1 10     Pulleys for shoulder flexion to tolerance 1 10 x Warm up   Supine dowel julius ER, flexion with LUE in neutral, chest press 1 10 ea x To tolerance today, more painful. Improved with LUE in neutral for overhead flexion. Supine ceiling circles fwd/bwd 1 10     Supine SA punch no weight  1 10  Initiated. Good tolerance . 2 sec hold at end range    Submax isometrics all directions in standing 5 sec 5 ea     Leaning over rows LUE, and leaning over abduction - no weight 1 10 ea     Sidelying ER with elbow at side no weight, abduction no weight  1 10 ea  Contact guard support for abduction - good tolerance   Supine active flexion from side to tolerance with CGA 1 5     Bicep curl supine peach band  1 10  Initiated. Good tolerance, no pain           Activities Time    Notes/Comments   Adjusted sling for proper fit      Removed pain pump and changed post op bandages              Specific Interventions Next Treatment: Per Dr. George Ruiz type 1 RCR protocol    Activity/Treatment Tolerance:  [x]  Patient tolerated treatment well  []  Patient limited by fatigue  []  Patient limited by pain   []  Patient limited by other medical complications  []  Other:     Assessment: Carlyon Schwab demonstrated increased soreness today. Pain in anterior shoulder with report of small \"catch. \" Possible inflammation from doing additional activities at home. Encouraged rotation ice and heat, continue stretching but avoid pain with AROM exercises.      Areas for Improvement: impaired ROM, impaired strength, and pain  Prognosis: good    GOALS:  Patient Goal: to decrease pain in left shoulder for return to normal activities    Short Term Goals:  Time Frame: 4 weeks  Pt. Will demo independence with HEP for LUE for improved motion, decreased pain and eventual strength for return to normal routines - GOAL MET 8/18/22 WITH PT. INDEPENDENT WITH PENDULUMS, TABLE SLIDES, SCAPULAR EXERCISES, SUPINE DOWEL FOR CHEST PRESS AND ER, AND LEANING OVER/PRONE ROWS  Continue goal with upgrades per protocol as pt. Can tolerate    Pt. Will demo improved PROM left shoulder for flexion= 95, and ER= 35 for ease with eventual AROM and bathing and dressing - GOAL MET 8/18/22 WITH PASSIVE LEFT SHOULDER FLEXION = 120, ER AT 90= 38, IR AT 90= 45, AND ABD= 90  Revised goal: Pt. Will demo improved PROM left shoulder to flexion= 145, ER = 60 at 45 degrees abd, IR 60 at 45 degrees abd, and ABD= 120 for ease with AROM for bathing and dressing    Pt. Will report decreased pain left shoulder to no greater than 5/10 with PROM for ease with HEP and sleeping - GOAL NOT MET 8/18/22 WITH PT. REPORTING 6/10 PAIN UPON ARRIVAL TO THERAPY WITHOUT ANY MOVEMENT. PT. HAS BEEN REPORTING INCREASED ANTERIOR SHOULDER PAIN AND HAS BEEN REPORTING THAT THIS IS WORSENING AND SO DUE TO THIS, NO RESISTIVE ACTIVITIES HAVE BEEN ADDED TO PT'S THERAPY SESSION OR HEP. Long Term Goals:  Time Frame: 12 weeks  Pt. Will demo functional AROM left shoulder to be able to reach into first shelf of upper cupboard to obtain lightweight items for cooking - NOT MET - PT. 5 WEEKS POST OP  Pt. Will be able to drive using LUE to hold and turn steering wheel - NOT MET- PT. 5 WEEKS POST OP  Pt. Will be able to use LUE for gardening tasks with minimal to no pain or difficulty NOT MET - 5 WEEKS POST OP    Patient Education:   [x]  HEP/Education Completed: Discussed the possibility of over use with left UE. Encouraged ice and heat for pain management.      [address] for HEP: pendulums all directions, scap pinches, table slides, AROM elbow, forearm, wrist and hand - handouts given  8/11/22 added leaning over rows, supine dowel for chest press, ER to HEP    []  No new Education completed  []  Reviewed Prior HEP new schedule. Reviewed protocol progressions    [x]  Patient verbalized and/or demonstrated understanding of education provided. []  Patient unable to verbalize and/or demonstrate understanding of education provided. Will continue education. [x]  Barriers to learning: none    PLAN:  Treatment Recommendations: Strengthening, Range of Motion, Manual Therapy - Soft Tissue Mobilization, Home Exercise Program, and Self-Care Education and Training per treatment protocol    []  Plan of care initiated. Plan to see patient 2 times per week for 12 weeks to address the treatment planned outlined above.   [x]  Continue with current plan of care  []  Modify plan of care as follows:    []  Hold pending physician visit  []  Discharge    Time In 1555   Time Out 1636   Timed Code Minutes: 41 min   Total Treatment Time: 39 min       JAMES GRIER/[place] #[id number]

## 2022-09-20 ENCOUNTER — HOSPITAL ENCOUNTER (OUTPATIENT)
Dept: OCCUPATIONAL THERAPY | Age: 47
Setting detail: THERAPIES SERIES
End: 2022-09-20
Payer: COMMERCIAL

## 2022-09-28 ENCOUNTER — HOSPITAL ENCOUNTER (OUTPATIENT)
Dept: OCCUPATIONAL THERAPY | Age: 47
Setting detail: THERAPIES SERIES
Discharge: HOME | End: 2022-09-28
Payer: COMMERCIAL

## 2022-09-28 PROCEDURE — 97110 THERAPEUTIC EXERCISES: CPT

## 2022-09-28 NOTE — PROGRESS NOTES
[address]  [] EVALUATION  [x] DAILY NOTE (LAND) [] DAILY NOTE (AQUATIC )   [] PROGRESS NOTE [] DISCHARGE NOTE    [x] OUTPATIENT REHABILITATION [facility]   [] [place] [address]    [] [facility]   [] Cipriano Parisi    Date: 2022  Patient Name:  Radha Bautista  : 1975  MRN: [id number]  CSN: [id number]    Referring Practitioner Velia Pelaez MD   Diagnosis Left Rotator cuff tear   Treatment Diagnosis Decreased ROM, strength, and pain Left UE s/p RCR   Date of Evaluation 22      Functional Outcome Measure Used UEFS   Functional Outcome Score  (22)       Insurance: Primary: Payor: UMR /  /  / ,   Secondary:    Authorization Information: No precert needed, 60 vs. Combined of OT/PT/ST   Visit # 14, 6/10 for PN; (progress note 22)   Visits Allowed: 20   Recertification Date: Oct. 10, 2022   Physician Follow-Up: 2022   Physician Orders: Use type I RCR protocol, no bicep resistance x6 weeks   Pertinent History: Pt. Was here for OT for left shoulder pain earlier this year but did not have a significant improvement. Pt. Then had further testing which showed a rotator cuff tear. Pt. Underwent surgery 7/15/22 for RCR and Bicep tenodesis. SUBJECTIVE: Patient did not come to therapy last week due to increased pain in the left shoulder. This has been going on for several weeks. Relates she attempted to call to get an earlier appointment, however, was not able to get in earlier than her scheduled appointment on . OBJECTIVE:  TREATMENT   Precautions: Follow Dr. Juan Pablo Kelly Type 1 RCR protocol with     Pain: 3/10 pointing to upper trap region and into deltoid.       X in shaded column indicates Activity Completed Today   Modalities Parameters/  Location  Notes/Comments                     Manual Therapy Time/  Technique  Notes/Comments   PROM left shoulder all planes to tolerance  x Tight in end decrease pressure anterior shoulder. Patient is scheduled to see referring physician on 10/3. Areas for Improvement: impaired ROM, impaired strength, and pain  Prognosis: good    GOALS:  Patient Goal: to decrease pain in left shoulder for return to normal activities    Short Term Goals:  Time Frame: 4 weeks  Pt. Will demo independence with HEP for LUE for improved motion, decreased pain and eventual strength for return to normal routines - GOAL MET 8/18/22 WITH PT. INDEPENDENT WITH PENDULUMS, TABLE SLIDES, SCAPULAR EXERCISES, SUPINE DOWEL FOR CHEST PRESS AND ER, AND LEANING OVER/PRONE ROWS  Continue goal with upgrades per protocol as pt. Can tolerate    Pt. Will demo improved PROM left shoulder for flexion= 95, and ER= 35 for ease with eventual AROM and bathing and dressing - GOAL MET 8/18/22 WITH PASSIVE LEFT SHOULDER FLEXION = 120, ER AT 90= 38, IR AT 90= 45, AND ABD= 90  Revised goal: Pt. Will demo improved PROM left shoulder to flexion= 145, ER = 60 at 45 degrees abd, IR 60 at 45 degrees abd, and ABD= 120 for ease with AROM for bathing and dressing    Pt. Will report decreased pain left shoulder to no greater than 5/10 with PROM for ease with HEP and sleeping - GOAL NOT MET 8/18/22 WITH PT. REPORTING 6/10 PAIN UPON ARRIVAL TO THERAPY WITHOUT ANY MOVEMENT. PT. HAS BEEN REPORTING INCREASED ANTERIOR SHOULDER PAIN AND HAS BEEN REPORTING THAT THIS IS WORSENING AND SO DUE TO THIS, NO RESISTIVE ACTIVITIES HAVE BEEN ADDED TO PT'S THERAPY SESSION OR HEP. Long Term Goals:  Time Frame: 12 weeks  Pt. Will demo functional AROM left shoulder to be able to reach into first shelf of upper cupboard to obtain lightweight items for cooking - NOT MET - PT. 5 WEEKS POST OP  Pt. Will be able to drive using LUE to hold and turn steering wheel - NOT MET- PT. 5 WEEKS POST OP  Pt.  Will be able to use LUE for gardening tasks with minimal to no pain or difficulty NOT MET - 5 WEEKS POST OP    Patient Education:   [x]  HEP/Education Completed: Discussed the possibility of over use with left UE. Encouraged ice and heat for pain management. Medbridge for HEP: pendulums all directions, scap pinches, table slides, AROM elbow, forearm, wrist and hand - handouts given  8/11/22 added leaning over rows, supine dowel for chest press, ER to HEP    []  No new Education completed  []  Reviewed Prior HEP new schedule. Reviewed protocol progressions    [x]  Patient verbalized and/or demonstrated understanding of education provided. []  Patient unable to verbalize and/or demonstrate understanding of education provided. Will continue education. [x]  Barriers to learning: none    PLAN:  Treatment Recommendations: Strengthening, Range of Motion, Manual Therapy - Soft Tissue Mobilization, Home Exercise Program, and Self-Care Education and Training per treatment protocol    []  Plan of care initiated. Plan to see patient 2 times per week for 12 weeks to address the treatment planned outlined above.   [x]  Continue with current plan of care  []  Modify plan of care as follows:    []  Hold pending physician visit  []  Discharge    Time In 1645   Time Out 1720   Timed Code Minutes: 35 min   Total Treatment Time: 35 min       JAMES GRIER/[place] #[id number]

## 2022-10-04 ENCOUNTER — HOSPITAL ENCOUNTER (OUTPATIENT)
Dept: OCCUPATIONAL THERAPY | Age: 47
Setting detail: THERAPIES SERIES
Discharge: HOME | End: 2022-10-04
Payer: COMMERCIAL

## 2022-10-04 PROCEDURE — 97140 MANUAL THERAPY 1/> REGIONS: CPT

## 2022-10-04 PROCEDURE — 97110 THERAPEUTIC EXERCISES: CPT

## 2022-10-04 NOTE — PROGRESS NOTES
[address]  [] EVALUATION  [x] DAILY NOTE (LAND) [] DAILY NOTE (AQUATIC )   [] PROGRESS NOTE [] DISCHARGE NOTE    [x] OUTPATIENT REHABILITATION [facility]   [] [place] [address]    [] [facility]   [] [facility]    Date: 10/4/2022  Patient Name:  Jass Cao  : 1975  MRN: [id number]  CSN: [id number]    Referring Practitioner Giovani Aguila MD   Diagnosis Left Rotator cuff tear   Treatment Diagnosis Decreased ROM, strength, and pain Left UE s/p RCR   Date of Evaluation 22      Functional Outcome Measure Used UEFS   Functional Outcome Score  (22)       Insurance: Primary: Payor: UMR /  /  / ,   Secondary:    Authorization Information: No precert needed, 60 vs. Combined of OT/PT/ST   Visit # 15, 7/10 for PN; (progress note 22)   Visits Allowed: 20   Recertification Date: Oct. 10, 2022   Physician Follow-Up: 2022   Physician Orders: Use type I RCR protocol, no bicep resistance x6 weeks   Pertinent History: Pt. Was here for OT for left shoulder pain earlier this year but did not have a significant improvement. Pt. Then had further testing which showed a rotator cuff tear. Pt. Underwent surgery 7/15/22 for RCR and Bicep tenodesis. SUBJECTIVE: Patient reports she went to the MD yesterday, pain in the bicep is muscle spasms, other pain is normal with surgery performed and will take time. Patient reports MD is pleased with flexion and abduction ROM and current strength. Can now progress to \"the lightest\" bands, and can only lift weight of coffee cup. Will f/u in 2 months. Patient reports shoulder feels \"stuck\" and very tight. OBJECTIVE:  TREATMENT   Precautions:  Follow Dr. Sedrick Chao Type 1 RCR protocol with     Pain: 4/10 pointing upper trap and anterior shoulder      X in shaded column indicates Activity Completed Today   Modalities Parameters/  Location  Notes/Comments Manual Therapy Time/  Technique  Notes/Comments   PROM left shoulder all planes to tolerance  x Tight in end ranges, reports a small \"catch\" at times today. External rotation and end range flexion remains tight    Sidelying IASTM to L upper trap, levator scap, medial scap border, subscapularis, pec minor,VERY gently cross friction on bicep  x    Gentle scapular and GH mobilizations  x    Exercises   Sets/  Sec Reps  Notes/Comments   AAROM left elbow; AROM forearm, wrist and hand 1 10 ea     Pendulums all directions 1 10 ea x Warm up    Seated scapular pinches, backward shoulder rolls 1 10 ea x    Table slides 1 10     Pulleys for shoulder flexion to tolerance 1 10 x Warm up   Supine dowel julius ER, flexion with LUE in neutral, chest press 1 10 ea x To tolerance today, more painful. Improved with LUE in neutral for overhead flexion. Supine ceiling circles fwd/bwd 1 10     Supine SA punch no weight  1 10  Initiated. Good tolerance . 2 sec hold at end range    Submax isometrics all directions in standing 5 sec 5 ea     Leaning over rows LUE, and leaning over abduction - no weight 1 10 ea     Sidelying ER with elbow at side no weight, abduction no weight  1 10 ea  Contact guard support for abduction - good tolerance   Supine active flexion from side to tolerance with CGA 1 5     Bicep curl, tricep extension supine peach band  1 10 ea x Trial to re-initiate with fair tolerance. Discomfort and grimace was noticed throughout reps    Seated edge of mat- B ER no resistance  1 10 x Initiated.  Cues to keep shoulder back with overall good tolerance           Activities Time    Notes/Comments   kinesiotape 2 \"I\" strips anterior to posterior for mechanical correction   x    Removed pain pump and changed post op bandages              Specific Interventions Next Treatment: Per Dr. Latonia Pablo type 1 RCR protocol    Activity/Treatment Tolerance:  [x]  Patient tolerated treatment well  []  Patient limited by fatigue  []  Patient limited by pain   []  Patient limited by other medical complications  []  Other:     Assessment: Patient is progressing slowly towards goals. Continued increased pain throughout session and 1 \"catch\" throughout ER this date. Trial of gentle strengthening that were familiar to patient, with fair to poor tolerance. Discomfort is noted. Education throughout session of patient to be thoughtful of household activities she is completing at home, and reference her weight restriction of only a coffee cup. Patient continues to be very active, and therapist have voiced their concern on poor activity modifications. Areas for Improvement: impaired ROM, impaired strength, and pain  Prognosis: good    GOALS:  Patient Goal: to decrease pain in left shoulder for return to normal activities    Short Term Goals:  Time Frame: 4 weeks  Pt. Will demo independence with HEP for LUE for improved motion, decreased pain and eventual strength for return to normal routines - GOAL MET 8/18/22 WITH PT. INDEPENDENT WITH PENDULUMS, TABLE SLIDES, SCAPULAR EXERCISES, SUPINE DOWEL FOR CHEST PRESS AND ER, AND LEANING OVER/PRONE ROWS  Continue goal with upgrades per protocol as pt. Can tolerate    Pt. Will demo improved PROM left shoulder for flexion= 95, and ER= 35 for ease with eventual AROM and bathing and dressing - GOAL MET 8/18/22 WITH PASSIVE LEFT SHOULDER FLEXION = 120, ER AT 90= 38, IR AT 90= 45, AND ABD= 90  Revised goal: Pt. Will demo improved PROM left shoulder to flexion= 145, ER = 60 at 45 degrees abd, IR 60 at 45 degrees abd, and ABD= 120 for ease with AROM for bathing and dressing    Pt. Will report decreased pain left shoulder to no greater than 5/10 with PROM for ease with HEP and sleeping - GOAL NOT MET 8/18/22 WITH PT. REPORTING 6/10 PAIN UPON ARRIVAL TO THERAPY WITHOUT ANY MOVEMENT.  PT. HAS BEEN REPORTING INCREASED ANTERIOR SHOULDER PAIN AND HAS BEEN REPORTING THAT THIS IS WORSENING AND SO DUE TO THIS, NO RESISTIVE ACTIVITIES HAVE BEEN ADDED TO PT'S THERAPY SESSION OR HEP. Long Term Goals:  Time Frame: 12 weeks  Pt. Will demo functional AROM left shoulder to be able to reach into first shelf of upper cupboard to obtain lightweight items for cooking - NOT MET - PT. 5 WEEKS POST OP  Pt. Will be able to drive using LUE to hold and turn steering wheel - NOT MET- PT. 5 WEEKS POST OP  Pt. Will be able to use LUE for gardening tasks with minimal to no pain or difficulty NOT MET - 5 WEEKS POST OP    Patient Education:   []  HEP/Education Completed: Discussed the possibility of over use with left UE. Encouraged ice and heat for pain management. Medbridge for HEP: pendulums all directions, scap pinches, table slides, AROM elbow, forearm, wrist and hand - handouts given  8/11/22 added leaning over rows, supine dowel for chest press, ER to HEP    []  No new Education completed  [x]  Reviewed Prior HEP; activity modifications   [x]  Patient verbalized and/or demonstrated understanding of education provided. []  Patient unable to verbalize and/or demonstrate understanding of education provided. Will continue education. [x]  Barriers to learning: none    PLAN:  Treatment Recommendations: Strengthening, Range of Motion, Manual Therapy - Soft Tissue Mobilization, Home Exercise Program, and Self-Care Education and Training per treatment protocol    []  Plan of care initiated. Plan to see patient 2 times per week for 12 weeks to address the treatment planned outlined above.   [x]  Continue with current plan of care  []  Modify plan of care as follows:    []  Hold pending physician visit  []  Discharge    Time In 1600   Time Out 1640   Timed Code Minutes: 40 min   Total Treatment Time: 40 min       Teo MARTINS #[id number]

## 2022-10-11 ENCOUNTER — HOSPITAL ENCOUNTER (OUTPATIENT)
Dept: OCCUPATIONAL THERAPY | Age: 47
Setting detail: THERAPIES SERIES
Discharge: HOME | End: 2022-10-11
Payer: COMMERCIAL

## 2022-10-11 ENCOUNTER — APPOINTMENT (OUTPATIENT)
Dept: OCCUPATIONAL THERAPY | Age: 47
End: 2022-10-11
Payer: COMMERCIAL

## 2022-10-11 ENCOUNTER — OFFICE VISIT (OUTPATIENT)
Dept: PHYSICAL MEDICINE AND REHAB | Age: 47
End: 2022-10-11
Payer: COMMERCIAL

## 2022-10-11 VITALS
HEART RATE: 64 BPM | SYSTOLIC BLOOD PRESSURE: 150 MMHG | DIASTOLIC BLOOD PRESSURE: 88 MMHG | BODY MASS INDEX: 32.14 KG/M2 | HEIGHT: 66 IN | WEIGHT: 200 LBS

## 2022-10-11 DIAGNOSIS — M16.52 POST-TRAUMATIC OSTEOARTHRITIS OF LEFT HIP: ICD-10-CM

## 2022-10-11 DIAGNOSIS — G89.4 CHRONIC PAIN SYNDROME: ICD-10-CM

## 2022-10-11 DIAGNOSIS — G62.9 NEUROPATHY: ICD-10-CM

## 2022-10-11 DIAGNOSIS — M47.816 SPONDYLOSIS OF LUMBAR REGION WITHOUT MYELOPATHY OR RADICULOPATHY: Primary | ICD-10-CM

## 2022-10-11 DIAGNOSIS — M53.3 SACROILIAC JOINT DYSFUNCTION: ICD-10-CM

## 2022-10-11 DIAGNOSIS — M48.07 LUMBOSACRAL SPINAL STENOSIS: ICD-10-CM

## 2022-10-11 DIAGNOSIS — M46.1 SI (SACROILIAC) JOINT INFLAMMATION (HCC): ICD-10-CM

## 2022-10-11 PROCEDURE — 97110 THERAPEUTIC EXERCISES: CPT

## 2022-10-11 PROCEDURE — 97140 MANUAL THERAPY 1/> REGIONS: CPT

## 2022-10-11 PROCEDURE — 99214 OFFICE O/P EST MOD 30 MIN: CPT | Performed by: NURSE PRACTITIONER

## 2022-10-11 ASSESSMENT — ENCOUNTER SYMPTOMS
SHORTNESS OF BREATH: 0
COLOR CHANGE: 0
EYE PAIN: 0
CONSTIPATION: 0
COUGH: 0
BACK PAIN: 1
RHINORRHEA: 0
SINUS PRESSURE: 0
CHEST TIGHTNESS: 0
DIARRHEA: 0
NAUSEA: 0
VOMITING: 0
ABDOMINAL PAIN: 0
SORE THROAT: 0
WHEEZING: 0
PHOTOPHOBIA: 0

## 2022-10-12 ENCOUNTER — APPOINTMENT (OUTPATIENT)
Dept: OCCUPATIONAL THERAPY | Age: 47
End: 2022-10-12
Payer: COMMERCIAL

## 2022-10-19 ENCOUNTER — HOSPITAL ENCOUNTER (OUTPATIENT)
Dept: OCCUPATIONAL THERAPY | Age: 47
Setting detail: THERAPIES SERIES
Discharge: HOME | End: 2022-10-19
Payer: COMMERCIAL

## 2022-10-19 PROCEDURE — 97110 THERAPEUTIC EXERCISES: CPT

## 2022-10-19 NOTE — PROGRESS NOTES
** PLEASE SIGN, DATE AND TIME CERTIFICATION BELOW AND RETURN TO [facility] OUTPATIENT REHABILITATION (FAX #: [phone]). ATTEST/CO-SIGN IF ACCESSING VIA [id number]. THANK YOU.**    I certify that I have examined the patient below and determined that Physical Medicine and Rehabilitation service is necessary and that I approve the established plan of care for up to 90 days or as specifically noted. Attestation, signature or co-signature of physician indicates approval of certification requirements.    ________________________ ____________ __________  Physician Signature   Date   Time    [address]  [] EVALUATION  [] DAILY NOTE (LAND) [] DAILY NOTE (AQUATIC )   [x] PROGRESS NOTE [] DISCHARGE NOTE    [x] [facility]   [] [place] [address]    [] [facility] [facility]   [] [facility]    Date: 10/19/2022  Patient Name:  Armond Huertas  : 1975  MRN: [id number]  CSN: [id number]    Referring Practitioner Manuel Tuttle MD   Diagnosis Left Rotator cuff tear   Treatment Diagnosis Decreased ROM, strength, and pain Left UE s/p RCR   Date of Evaluation 22      Functional Outcome Measure Used UEFS   Functional Outcome Score 22/80 (22); 38/80 (10/19/22)      Insurance: Primary: Payor: R /  /  / ,   Secondary:    Authorization Information: No precert needed, 60 vs. Combined of OT/PT/ST   Visit # 17, PN COMPLETED 10/19/22 (progress note 22)   Visits Allowed: 20    (36/60 visits used in  PT/OT combined? ? )   Recertification Date: 2022   Physician Follow-Up: 2022   Physician Orders: Use type I RCR protocol, no bicep resistance x6 weeks   Pertinent History: Pt. Was here for OT for left shoulder pain earlier this year but did not have a significant improvement. Pt. Then had further testing which showed a rotator cuff tear. Pt. Underwent surgery 7/15/22 for RCR and Bicep tenodesis. SUBJECTIVE: Patient reports muscle spasms are less but they are still present. Relates she is able to complete her own bathing/dressing routine as well as light homemaking. Continues to avoid higher level activities or anything that involves lifting. OBJECTIVE:  TREATMENT   Precautions: Follow Dr. Angel South Type 1 RCR protocol with     Pain: 4/10 pointing upper trap and anterior shoulder      X in shaded column indicates Activity Completed Today   Modalities Parameters/  Location  Notes/Comments                     Manual Therapy Time/  Technique  Notes/Comments   PROM left shoulder all planes to tolerance  x With MHP this date; Large improvement in tolerance throughout all end ranges this date.  Discomfort remains, but no catch or guarded movement    Sidelying IASTM to L upper trap, levator scap, medial scap border, subscapularis, pec minor,VERY gently cross friction on bicep      Gentle scapular and GH mobilizations  x    Exercises   Sets/  Sec Reps  Notes/Comments   AAROM left elbow; AROM forearm, wrist and hand 1 10 ea     Pendulums all directions 1 10 ea  Warm up    Seated scapular pinches, backward shoulder rolls 1 10 ea     Table slides 1 10     Pulleys for shoulder flexion to tolerance 1 10 x Warm up   Supine dowel julius ER, flexion with LUE in neutral, chest press 1 10 ea x Cues for ER   Supine ceiling circles fwd/bwd 1 10     Supine SA punch no weight  1 10     Submax isometrics all directions in standing 5 sec 5 ea     Leaning over rows LUE, and leaning over abduction - no weight 1 10 ea     Sidelying ER with elbow at side no weight, abduction no weight  1 10 ea  Contact guard support for abduction - good tolerance   Supine active flexion from side to tolerance with CGA 1 5     Bicep curl, tricep extension supine peach band  1 10 ea  Fair tolerance, grimace demonstrated    Seated edge of mat- B ER no resistance  1 10     Initiated sleeper stretch  15 sec 3 x    Activities Time    Notes/Comments kinesiotape 2 \"I\" strips anterior to posterior for mechanical correction       Removed pain pump and changed post op bandages              Specific Interventions Next Treatment: Per Dr. Pedro Salinas type 1 RCR protocol    Activity/Treatment Tolerance:  [x]  Patient tolerated treatment well  []  Patient limited by fatigue  []  Patient limited by pain   []  Patient limited by other medical complications  []  Other:     Assessment: Patient is 14 weeks s/p left rotator cuff repair. Is demonstrating slow progress towards goals secondary to increased pain. Relates she discussed this with the physician at last appointment and she was told due to the extent of the surgery, pain will be present awhile. Has returned to low level ADL's including bathing/dressing. No lifting is being performed at this time. Significant tightness is noted with a lot of limitation for AROM. Requires continued OT 2x a week for 6 additional weeks. Areas for Improvement: impaired ROM, impaired strength, and pain  Prognosis: good    GOALS:  Patient Goal: to decrease pain in left shoulder for return to normal activities    Short Term Goals:  Time Frame: 4 weeks  Pt. Will demo independence with HEP for LUE for improved motion, decreased pain and eventual strength for return to normal routines. GOAL MET Patient is completing HEP for stretching and ROM. No strengthening yet with theraband. CONTINUE GOAL     Pt. Will demo improved PROM left shoulder to flexion= 145, ER = 60 at 45 degrees abd, IR 60 at 45 degrees abd, and ABD= 120 for ease with AROM for bathing and dressing. GOAL NOT MET Flexion= 140, ER= 50 at 45 degrees abd, IR= 68 at 45 degrees abd, abduction= 120 degrees at progress note. Bathing and dressing is easier with modifications continuing. CONTINUE GOAL     Pt. Will report decreased pain left shoulder to no greater than 5/10 with PROM for ease with HEP and sleeping.  GOAL NOT MET Patient reports she is waking up less but it is positional. When she does wake up in pain it is approximately 7/10. CONTINUE GOAL     4. NEW GOAL: Patient will demo improved AROM left shoulder to flexion= 125, ER= 40, abduction= 95, IR thumb tip= to waistband behind the back for ease with reaching into overhead cupboard. Long Term Goals:  Time Frame: 6 weeks  Pt. Will demo functional AROM left shoulder to be able to reach into first shelf of upper cupboard to obtain lightweight items for cooking. GOAL NOT MET Patient is able to reach to eye level, not overhead. CONTINUE GOAL   Pt. Will be able to drive using LUE to hold and turn steering wheel. GOAL NOT MET Patient is able to assist minimally with left arm at the bottom of the steering wheel. CONTINUE GOAL   Pt. Will be able to use LUE for gardening tasks with minimal to no pain or difficulty. GOAL NOT MET, CONTINUE GOAL     Patient Education:   [x]  HEP/Education Completed: Discussed progress towards goals and POC to continue OT. Medbridge for HEP: pendulums all directions, scap pinches, table slides, AROM elbow, forearm, wrist and hand - handouts given  8/11/22 added leaning over rows, supine dowel for chest press, ER to HEP    []  No new Education completed  [x]  Reviewed Prior HEP; activity modifications   [x]  Patient verbalized and/or demonstrated understanding of education provided. []  Patient unable to verbalize and/or demonstrate understanding of education provided. Will continue education. [x]  Barriers to learning: none    PLAN:  Treatment Recommendations: Strengthening, Range of Motion, Manual Therapy - Soft Tissue Mobilization, Home Exercise Program, and Self-Care Education and Training per treatment protocol    []  Plan of care initiated. Plan to see patient 2 times per week for 12 weeks to address the treatment planned outlined above.   []  Continue with current plan of care  [x]  Modify plan of care as follows:  Plan to see patient 2 times per week for 6 weeks to address the treatment plan outlined above.   []  Hold pending physician visit  []  Discharge    Time In 1630   Time Out 1605   Timed Code Minutes: 45 min   Total Treatment Time: 45 min       JAMES GRIER/CA #[id number]

## 2022-10-25 ENCOUNTER — HOSPITAL ENCOUNTER (OUTPATIENT)
Dept: OCCUPATIONAL THERAPY | Age: 47
Setting detail: THERAPIES SERIES
End: 2022-10-25
Payer: COMMERCIAL

## 2022-11-01 ENCOUNTER — HOSPITAL ENCOUNTER (OUTPATIENT)
Dept: OCCUPATIONAL THERAPY | Age: 47
Setting detail: THERAPIES SERIES
Discharge: HOME | End: 2022-11-01
Payer: COMMERCIAL

## 2022-11-01 PROCEDURE — 97110 THERAPEUTIC EXERCISES: CPT

## 2022-11-01 PROCEDURE — 97140 MANUAL THERAPY 1/> REGIONS: CPT

## 2022-11-01 NOTE — PROGRESS NOTES
[address]  [] EVALUATION  [x] DAILY NOTE (LAND) [] DAILY NOTE (AQUATIC )   [] PROGRESS NOTE [] DISCHARGE NOTE    [x] OUTPATIENT REHABILITATION [facility]   [] [place] [address]    [] [facility]   [] Joyce Ramirez    Date: 2022  Patient Name:  Irving Quinn  : 1975  MRN: [id number]  CSN: [id number]    Referring Practitioner Stephanie He MD   Diagnosis Left Rotator cuff tear   Treatment Diagnosis Decreased ROM, strength, and pain Left UE s/p RCR   Date of Evaluation 22      Functional Outcome Measure Used UEFS   Functional Outcome Score 22 (22); 38/80 (10/19/22)      Insurance: Primary: Payor: R /  /  / ,   Secondary:    Authorization Information: No precert needed, 60 vs. Combined of OT/PT/ST   Visit # 18, 1/10 (PN COMPLETED 10/19/22; 22)   Visits Allowed: 24    (36/60 visits used in  PT/OT combined? ? )   Recertification Date: 2022   Physician Follow-Up: 2022   Physician Orders: Use type I RCR protocol, no bicep resistance x6 weeks   Pertinent History: Pt. Was here for OT for left shoulder pain earlier this year but did not have a significant improvement. Pt. Then had further testing which showed a rotator cuff tear. Pt. Underwent surgery 7/15/22 for RCR and Bicep tenodesis. SUBJECTIVE: Patient reports continued soreness in left anterior shoulder around to axilla. States arms hurts worse when weather is bad outside     OBJECTIVE:  TREATMENT   Precautions:  Follow Dr. Cristobal Villaseñor Type 1 RCR protocol with     Pain: 4/10 pointing upper trap and anterior shoulder      X in shaded column indicates Activity Completed Today   Modalities Parameters/  Location  Notes/Comments                     Manual Therapy Time/  Technique  Notes/Comments   PROM left shoulder all planes to tolerance  xx With MHP this date   Sidelying IASTM to L upper trap, levator scap, medial scap border, subscapularis, pec minor,VERY gently cross friction on bicep      GH mobilizations; manual subscap stretch  xx    Exercises   Sets/  Sec Reps  Notes/Comments   AAROM left elbow; AROM forearm, wrist and hand 1 10 ea     Seated backward shoulder rolls 1 10  xx    Table slides 1 10     Pulleys for shoulder flexion to tolerance 1 10 xx Cool down   Supine dowel julius ER 1 10 ea xx    Supine active shoulder flexion from 90 to tolerance no weight,  ceiling circles fwd/bwd with 1# 1 10 xx    Supine SA punch 1# 1 10 xx           Leaning over rows LUE, and leaning over abduction - no weight 1 10 ea     Sidelying ER with elbow at side 1#, abduction no weight  1 10 ea xx    Biodex 120 speed x3 min. Backward only   xx    Bicep curl, tricep extension supine peach band  1 10 ea  Fair tolerance, grimace demonstrated    Seated edge of mat- B ER no resistance  1 10     Initiated sleeper stretch  15 sec 3     Activities Time    Notes/Comments   kinesiotape 2 \"I\" strips anterior to posterior for mechanical correction                     Specific Interventions Next Treatment: Per Dr. Angeline Duran type 1 RCR protocol    Activity/Treatment Tolerance:  [x]  Patient tolerated treatment well  []  Patient limited by fatigue  []  Patient limited by pain   []  Patient limited by other medical complications  []  Other:     Assessment: Patient progressing slowly toward goals. Continues to report anterior shoulder pain. Added mild resistive exercises today  Areas for Improvement: impaired ROM, impaired strength, and pain  Prognosis: good    GOALS:  Patient Goal: to decrease pain in left shoulder for return to normal activities    Short Term Goals:  Time Frame: 4 weeks  Pt. Will demo independence with HEP for LUE for improved motion, decreased pain and eventual strength for return to normal routines. GOAL MET Patient is completing HEP for stretching and ROM. No strengthening yet with theraband. CONTINUE GOAL     Pt.  Will demo improved PROM left shoulder to flexion= 145, ER = 60 at 45 degrees abd, IR 60 at 45 degrees abd, and ABD= 120 for ease with AROM for bathing and dressing. GOAL NOT MET Flexion= 140, ER= 50 at 45 degrees abd, IR= 68 at 45 degrees abd, abduction= 120 degrees at progress note. Bathing and dressing is easier with modifications continuing. CONTINUE GOAL     Pt. Will report decreased pain left shoulder to no greater than 5/10 with PROM for ease with HEP and sleeping. GOAL NOT MET Patient reports she is waking up less but it is positional. When she does wake up in pain it is approximately 7/10. CONTINUE GOAL     4. NEW GOAL: Patient will demo improved AROM left shoulder to flexion= 125, ER= 40, abduction= 95, IR thumb tip= to waistband behind the back for ease with reaching into overhead cupboard. Long Term Goals:  Time Frame: 6 weeks  Pt. Will demo functional AROM left shoulder to be able to reach into first shelf of upper cupboard to obtain lightweight items for cooking. GOAL NOT MET Patient is able to reach to eye level, not overhead. CONTINUE GOAL   Pt. Will be able to drive using LUE to hold and turn steering wheel. GOAL NOT MET Patient is able to assist minimally with left arm at the bottom of the steering wheel. CONTINUE GOAL   Pt. Will be able to use LUE for gardening tasks with minimal to no pain or difficulty. GOAL NOT MET, CONTINUE GOAL     Patient Education:   [x]  HEP/Education Completed: Discussed progress towards goals and POC to continue OT. Medbridge for HEP: pendulums all directions, scap pinches, table slides, AROM elbow, forearm, wrist and hand - handouts given  8/11/22 added leaning over rows, supine dowel for chest press, ER to HEP    [x]  No new Education completed  []  Reviewed Prior HEP; activity modifications   []  Patient verbalized and/or demonstrated understanding of education provided. []  Patient unable to verbalize and/or demonstrate understanding of education provided. Will continue education.   [x]  Barriers to learning: none    PLAN:  Treatment Recommendations: Strengthening, Range of Motion, Manual Therapy - Soft Tissue Mobilization, Home Exercise Program, and Self-Care Education and Training per treatment protocol    []  Plan of care initiated. Plan to see patient 2 times per week for 12 weeks to address the treatment planned outlined above.   []  Continue with current plan of care  [x]  Modify plan of care as follows:  Plan to see patient 2 times per week for 6 weeks to address the treatment plan outlined above.   []  Hold pending physician visit  []  Discharge    Time In 1600   Time Out 1640   Timed Code Minutes: 40 min   Total Treatment Time: 40 min       Mirian Salter, OTR/L [phone]

## 2022-11-08 ENCOUNTER — HOSPITAL ENCOUNTER (OUTPATIENT)
Dept: OCCUPATIONAL THERAPY | Age: 47
Setting detail: THERAPIES SERIES
Discharge: HOME | End: 2022-11-08
Payer: COMMERCIAL

## 2022-11-08 PROCEDURE — 97140 MANUAL THERAPY 1/> REGIONS: CPT

## 2022-11-08 PROCEDURE — 97110 THERAPEUTIC EXERCISES: CPT

## 2022-11-08 NOTE — PROGRESS NOTES
[address]  [] EVALUATION  [x] DAILY NOTE (LAND) [] DAILY NOTE (AQUATIC )   [] PROGRESS NOTE [] DISCHARGE NOTE    [x] OUTPATIENT REHABILITATION [facility]   [] [place] [address]    [] [facility]   [] [facility]    Date: 2022  Patient Name:  Cynthia Moon  : 1975  MRN: [id number]  CSN: [id number]    Referring Practitioner Pratibha Beaulieu MD   Diagnosis Left Rotator cuff tear   Treatment Diagnosis Decreased ROM, strength, and pain Left UE s/p RCR   Date of Evaluation 22      Functional Outcome Measure Used UEFS   Functional Outcome Score 22 (22); 38/ (10/19/22)      Insurance: Primary: Payor: UMR /  /  / ,   Secondary:    Authorization Information: No precert needed, 60 vs. Combined of OT/PT/ST   Visit # 19, 2/10 (PN COMPLETED 10/19/22; 22)   Visits Allowed: 24    (36/60 visits used in  PT/OT combined? ? )   Recertification Date: 2022   Physician Follow-Up: 2022   Physician Orders: Use type I RCR protocol, no bicep resistance x6 weeks   Pertinent History: Pt. Was here for OT for left shoulder pain earlier this year but did not have a significant improvement. Pt. Then had further testing which showed a rotator cuff tear. Pt. Underwent surgery 7/15/22 for RCR and Bicep tenodesis. SUBJECTIVE: Patient reports minimal discomfort at arrival to therapy but last night had an episode of pain     OBJECTIVE:  TREATMENT   Precautions:  Follow Dr. Pernell Vences Type 1 RCR protocol with     Pain: 4/10 pointing upper trap and anterior shoulder      X in shaded column indicates Activity Completed Today   Modalities Parameters/  Location  Notes/Comments                     Manual Therapy Time/  Technique  Notes/Comments   PROM left shoulder all planes to tolerance  x With MHP this date   Sidelying IASTM to L upper trap, levator scap, medial scap border, subscapularis, pec minor,VERY gently cross friction on bicep      GH mobilizations  x    Exercises   Sets/  Sec Reps  Notes/Comments   AAROM left elbow; AROM forearm, wrist and hand 1 10 ea     Seated backward shoulder rolls 1 10  x    Table slides 1 10     Pulleys for shoulder flexion to tolerance 1 10 x Cool down   Supine dowel julius ER 1 10 ea     Supine active shoulder flexion from 90 to tolerance no weight,  ceiling circles fwd/bwd with 1# 1 10 x    Supine SA punch 1# 1 10 x           Leaning over rows LUE, and leaning over abduction - no weight 1 10 ea     Sidelying ER with elbow at side 1#, abduction no weight  1 10 ea x    Biodex 120 speed x3 min. Backward only   x    Bicep curl, tricep extension supine peach band  1 10 ea  Fair tolerance, grimace demonstrated    Seated edge of mat- B ER no resistance  1 10     Initiated sleeper stretch  15 sec 3     Activities Time    Notes/Comments   kinesiotape 2 \"I\" strips anterior to posterior for mechanical correction                     Specific Interventions Next Treatment: Per Dr. Terrell Sparks type 1 RCR protocol    Activity/Treatment Tolerance:  [x]  Patient tolerated treatment well  []  Patient limited by fatigue  []  Patient limited by pain   []  Patient limited by other medical complications  []  Other:     Assessment: Patient progressing slowly toward goals. Tolerating biodex well and1# weights. Discussed the fact that she only has 5 visits left for the year for insurance and to think about if she would like to spread them out more. Areas for Improvement: impaired ROM, impaired strength, and pain  Prognosis: good    GOALS:  Patient Goal: to decrease pain in left shoulder for return to normal activities    Short Term Goals:  Time Frame: 4 weeks  Pt. Will demo independence with HEP for LUE for improved motion, decreased pain and eventual strength for return to normal routines. GOAL MET Patient is completing HEP for stretching and ROM. No strengthening yet with theraband. CONTINUE GOAL     Pt.  Will demo improved education. [x]  Barriers to learning: none    PLAN:  Treatment Recommendations: Strengthening, Range of Motion, Manual Therapy - Soft Tissue Mobilization, Home Exercise Program, and Self-Care Education and Training per treatment protocol    []  Plan of care initiated. Plan to see patient 2 times per week for 12 weeks to address the treatment planned outlined above.   []  Continue with current plan of care  [x]  Modify plan of care as follows:  Plan to see patient 1 times per week for 5 weeks to address the treatment plan outlined above.   []  Hold pending physician visit  []  Discharge    Time In 1600   Time Out 1645   Timed Code Minutes: 45 min   Total Treatment Time: 45 min       Alycia 70, OTR/L [id number]

## 2022-11-11 ENCOUNTER — APPOINTMENT (OUTPATIENT)
Dept: OCCUPATIONAL THERAPY | Age: 47
End: 2022-11-11
Payer: COMMERCIAL

## 2022-11-15 ENCOUNTER — HOSPITAL ENCOUNTER (OUTPATIENT)
Dept: OCCUPATIONAL THERAPY | Age: 47
Setting detail: THERAPIES SERIES
Discharge: HOME | End: 2022-11-15
Payer: COMMERCIAL

## 2022-11-15 PROCEDURE — 97110 THERAPEUTIC EXERCISES: CPT

## 2022-11-15 PROCEDURE — 97140 MANUAL THERAPY 1/> REGIONS: CPT

## 2022-11-15 NOTE — PROGRESS NOTES
[address]  [] EVALUATION  [x] DAILY NOTE (LAND) [] DAILY NOTE (AQUATIC )   [] PROGRESS NOTE [] DISCHARGE NOTE    [x] OUTPATIENT REHABILITATION [facility]   [] [place] [address]    [] [facility]   [] [place]    Date: 11/15/2022  Patient Name:  Trisha Hargrove  : 1975  MRN: [id number]  CSN: [id number]    Referring Practitioner Rancho Alberto MD   Diagnosis Left Rotator cuff tear   Treatment Diagnosis Decreased ROM, strength, and pain Left UE s/p RCR   Date of Evaluation 22      Functional Outcome Measure Used UEFS   Functional Outcome Score 22 (22); 38/80 (10/19/22)      Insurance: Primary: Payor: R /  /  / ,   Secondary:    Authorization Information: No precert needed, 60 vs. Combined of OT/PT/ST   Visit # 20, 3/10 (PN COMPLETED 10/19/22; 22)   Visits Allowed: 24    (36/60 visits used in  PT/OT combined? ? )   Recertification Date: 2022   Physician Follow-Up: 2022   Physician Orders: Use type I RCR protocol, no bicep resistance x6 weeks   Pertinent History: Pt. Was here for OT for left shoulder pain earlier this year but did not have a significant improvement. Pt. Then had further testing which showed a rotator cuff tear. Pt. Underwent surgery 7/15/22 for RCR and Bicep tenodesis. SUBJECTIVE: Patient reports her shoulder is tender right now- pointing to anterior shoulder and axillary muscles. Patient reports overhead reaching is improving, but reports she is not lifting a lot of household objects secondary to pain. OBJECTIVE:  TREATMENT   Precautions:  Follow Dr. Juli Herr Type 1 RCR protocol with     Pain: 3-4/10 pointing upper trap and anterior shoulder      X in shaded column indicates Activity Completed Today   Modalities Parameters/  Location  Notes/Comments                     Manual Therapy Time/  Technique  Notes/Comments   PROM left shoulder all planes to tolerance  x With MHP this date  Patient felt one pop this date throughout abduction, therapist did not feel or hear. No pain present. Fair tolerance throughout all end range progressive stretching this date    Sidelying IASTM to L upper trap, levator scap, medial scap border, subscapularis, pec minor,VERY gently cross friction on bicep      Cross body posterior capsule stretch by therapist   x    GH mobilizations  x    Exercises   Sets/  Sec Reps  Notes/Comments   AAROM left elbow; AROM forearm, wrist and hand 1 10 ea     Seated backward shoulder rolls, scapular retraction 1 10  ea x Warm up this date    Table slides 1 10     Pulleys for shoulder flexion to tolerance 1 10 x Warm up this date    Supine dowel julius ER 1 10 ea     Supine active shoulder flexion from 90 to tolerance no weight,  ceiling circles fwd/bwd with 1# 1 10 x    Supine SA punch 1# 1 10 x Denies pain, patient feels fatigue           Leaning over rows LUE, and leaning over abduction - no weight 1 10 ea     Bent over Row to neutral only 1# 1 10 x Initiated this date. Good tolerance to neutral    Standing bicep curl 1# hand in neutral  1 10 x Initiated this date. Good tolerance, no pain    Sidelying ER with elbow at side 1#, abduction no weight  1 10 ea x Abduction only to 90 degrees this date; gentle distraction helped improved tolerance of patient with grimace noted    Biodex 120 speed x3 min.  Backward only   x Cool down this date   Bicep curl, tricep extension supine peach band  1 10 ea  Fair tolerance, grimace demonstrated    Seated edge of mat- B ER  1 10 x With 1# B hands this date- good tolerance, fatigue noted, no pain    Initiated sleeper stretch  15 sec 3     Activities Time    Notes/Comments   kinesiotape 2 \"I\" strips anterior to posterior for mechanical correction                     Specific Interventions Next Treatment: Per Dr. Terrell Sparks type 1 RCR protocol    Activity/Treatment Tolerance:  [x]  Patient tolerated treatment well  []  Patient limited by fatigue  []  Patient limited by pain   []  Patient limited by other medical complications  []  Other:     Assessment: Patient progressing slowly toward goals. Good tolerance throughout all planes of PROM this date, but fair tolerance with gentle strengthening. Fatigue and popping noted throughout gentle strengthening with 1# or weight of arm. Patient was asked multiple times and denies pain throughout popping and fatigue . Areas for Improvement: impaired ROM, impaired strength, and pain  Prognosis: good    GOALS:  Patient Goal: to decrease pain in left shoulder for return to normal activities    Short Term Goals:  Time Frame: 4 weeks  Pt. Will demo independence with HEP for LUE for improved motion, decreased pain and eventual strength for return to normal routines. GOAL MET Patient is completing HEP for stretching and ROM. No strengthening yet with theraband. CONTINUE GOAL     Pt. Will demo improved PROM left shoulder to flexion= 145, ER = 60 at 45 degrees abd, IR 60 at 45 degrees abd, and ABD= 120 for ease with AROM for bathing and dressing. GOAL NOT MET Flexion= 140, ER= 50 at 45 degrees abd, IR= 68 at 45 degrees abd, abduction= 120 degrees at progress note. Bathing and dressing is easier with modifications continuing. CONTINUE GOAL     Pt. Will report decreased pain left shoulder to no greater than 5/10 with PROM for ease with HEP and sleeping. GOAL NOT MET Patient reports she is waking up less but it is positional. When she does wake up in pain it is approximately 7/10. CONTINUE GOAL     4. NEW GOAL: Patient will demo improved AROM left shoulder to flexion= 125, ER= 40, abduction= 95, IR thumb tip= to waistband behind the back for ease with reaching into overhead cupboard. Long Term Goals:  Time Frame: 6 weeks  Pt. Will demo functional AROM left shoulder to be able to reach into first shelf of upper cupboard to obtain lightweight items for cooking.  GOAL NOT MET Patient is able to reach to eye level, not overhead. CONTINUE GOAL   Pt. Will be able to drive using LUE to hold and turn steering wheel. GOAL NOT MET Patient is able to assist minimally with left arm at the bottom of the steering wheel. CONTINUE GOAL   Pt. Will be able to use LUE for gardening tasks with minimal to no pain or difficulty. GOAL NOT MET, CONTINUE GOAL     Patient Education:   []  HEP/Education Completed: Discussed progress towards goals and POC to continue OT. [employer]bridge for HEP: pendulums all directions, scap pinches, table slides, AROM elbow, forearm, wrist and hand - handouts given  8/11/22 added leaning over rows, supine dowel for chest press, ER to HEP    []  No new Education completed  [x]  Reviewed Prior HEP; activity modifications with driving to [place] tomorrow. Reviewed overall technique with distraction to help with pain   []  Patient verbalized and/or demonstrated understanding of education provided. []  Patient unable to verbalize and/or demonstrate understanding of education provided. Will continue education. [x]  Barriers to learning: none    PLAN:  Treatment Recommendations: Strengthening, Range of Motion, Manual Therapy - Soft Tissue Mobilization, Home Exercise Program, and Self-Care Education and Training per treatment protocol    []  Plan of care initiated. Plan to see patient 2 times per week for 12 weeks to address the treatment planned outlined above.   []  Continue with current plan of care  [x]  Modify plan of care as follows:  Plan to see patient 1 times per week for 5 weeks to address the treatment plan outlined above.   []  Hold pending physician visit  []  Discharge    Time In 1614   Time Out 1654   Timed Code Minutes: 40 min   Total Treatment Time: 40 min       Sergo MARTINS #[id number]

## 2022-11-18 ENCOUNTER — APPOINTMENT (OUTPATIENT)
Dept: OCCUPATIONAL THERAPY | Age: 47
End: 2022-11-18
Payer: COMMERCIAL

## 2022-11-22 ENCOUNTER — HOSPITAL ENCOUNTER (OUTPATIENT)
Dept: OCCUPATIONAL THERAPY | Age: 47
Setting detail: THERAPIES SERIES
End: 2022-11-22
Payer: COMMERCIAL

## 2022-11-29 ENCOUNTER — HOSPITAL ENCOUNTER (OUTPATIENT)
Dept: OCCUPATIONAL THERAPY | Age: 47
Setting detail: THERAPIES SERIES
Discharge: HOME | End: 2022-11-29
Payer: COMMERCIAL

## 2022-11-29 PROCEDURE — 97110 THERAPEUTIC EXERCISES: CPT

## 2022-11-29 PROCEDURE — 97140 MANUAL THERAPY 1/> REGIONS: CPT

## 2022-11-29 NOTE — PROGRESS NOTES
[address]  [] EVALUATION  [x] DAILY NOTE (LAND) [] DAILY NOTE (AQUATIC )   [] PROGRESS NOTE [] DISCHARGE NOTE    [x] OUTPATIENT REHABILITATION CENTER [facility]   [] [place] [address]    [] [facility]   [] [facility]    Date: 2022  Patient Name:  Mildred Garber  : 1975  MRN: [id number]  CSN: [id number]    Referring Practitioner Juliet Baldwin MD   Diagnosis Left Rotator cuff tear   Treatment Diagnosis Decreased ROM, strength, and pain Left UE s/p RCR   Date of Evaluation 22      Functional Outcome Measure Used UEFS   Functional Outcome Score 22 (22); 38/80 (10/19/22)      Insurance: Primary: Payor: R /  /  / ,   Secondary:    Authorization Information: No precert needed, 60 vs. Combined of OT/PT/ST   Visit # 21, 4/10 (PN COMPLETED 10/19/22; 22)   Visits Allowed: 24    (36/60 visits used in  PT/OT combined? ? )   Recertification Date: 2022   Physician Follow-Up: 2022   Physician Orders: Use type I RCR protocol, no bicep resistance x6 weeks   Pertinent History: Pt. Was here for OT for left shoulder pain earlier this year but did not have a significant improvement. Pt. Then had further testing which showed a rotator cuff tear. Pt. Underwent surgery 7/15/22 for RCR and Bicep tenodesis. SUBJECTIVE: Patient reports her left shoulder is stiff and achy today - has been gone last week for thanksgiving. OBJECTIVE:  TREATMENT   Precautions:  Follow Dr. Norma Perez Type 1 RCR protocol with     Pain: 4/10 pointing upper trap and anterior shoulder      X in shaded column indicates Activity Completed Today   Modalities Parameters/  Location  Notes/Comments                     Manual Therapy Time/  Technique  Notes/Comments   PROM left shoulder all planes to tolerance  xx    Sidelying IASTM to L upper trap, levator scap, medial scap border, subscapularis, pec minor,VERY gently cross friction on bicep      Cross body posterior capsule stretch by therapist       [address] mobilizations  xx    Exercises   Sets/  Sec Reps  Notes/Comments   AAROM left elbow; AROM forearm, wrist and hand 1 10 ea     Seated backward shoulder rolls, scapular retraction 1 10  ea  Warm up this date    Table slides 1 10     Pulleys for shoulder flexion to tolerance 1 10 xx Warm up this date    Supine dowel julius ER 1 10 ea     Supine active shoulder flexion from side to tolerance no weight,  ceiling circles fwd/bwd with 1# 1 10 xx    Supine SA punch 1# 1 10 xx    Supine cross body stretch by pt. 15 sec 5 xx    Bent over Row to neutral only 2#, abd 1# 1 10 xx    Standing bicep curl 2# hand in neutral  1 10 xx    Sidelying ER with elbow at side 1#, abduction 1#  1 10 ea xx Abduction only to 90     Biodex 100 speed x3 min. Backward only   xx Cool down this date - increased resistance from 120 RPM to 100 RPM today with good tolerance   Bicep curl, tricep extension supine peach band  1 10 ea  Fair tolerance, grimace demonstrated    Seated edge of mat- B ER  1 10  With 1# B hands this date- good tolerance, fatigue noted, no pain    Initiated sleeper stretch  15 sec 3     Activities Time    Notes/Comments   kinesiotape 2 \"I\" strips anterior to posterior for mechanical correction                     Specific Interventions Next Treatment: Per Dr. Polly Kramer type 1 RCR protocol    Activity/Treatment Tolerance:  [x]  Patient tolerated treatment well  []  Patient limited by fatigue  []  Patient limited by pain   []  Patient limited by other medical complications  []  Other:     Assessment: Patient progressing slowly toward goals. Good tolerance for activities today with no reports of popping in shoulder    Areas for Improvement: impaired ROM, impaired strength, and pain  Prognosis: good    GOALS:  Patient Goal: to decrease pain in left shoulder for return to normal activities    Short Term Goals:  Time Frame: 4 weeks  Pt.  Will demo independence with HEP for LUE for improved motion, decreased pain and eventual strength for return to normal routines. GOAL MET Patient is completing HEP for stretching and ROM. No strengthening yet with theraband. CONTINUE GOAL     Pt. Will demo improved PROM left shoulder to flexion= 145, ER = 60 at 45 degrees abd, IR 60 at 45 degrees abd, and ABD= 120 for ease with AROM for bathing and dressing. GOAL NOT MET Flexion= 140, ER= 50 at 45 degrees abd, IR= 68 at 45 degrees abd, abduction= 120 degrees at progress note. Bathing and dressing is easier with modifications continuing. CONTINUE GOAL     Pt. Will report decreased pain left shoulder to no greater than 5/10 with PROM for ease with HEP and sleeping. GOAL NOT MET Patient reports she is waking up less but it is positional. When she does wake up in pain it is approximately 7/10. CONTINUE GOAL     4. NEW GOAL: Patient will demo improved AROM left shoulder to flexion= 125, ER= 40, abduction= 95, IR thumb tip= to waistband behind the back for ease with reaching into overhead cupboard. Long Term Goals:  Time Frame: 6 weeks  Pt. Will demo functional AROM left shoulder to be able to reach into first shelf of upper cupboard to obtain lightweight items for cooking. GOAL NOT MET Patient is able to reach to eye level, not overhead. CONTINUE GOAL   Pt. Will be able to drive using LUE to hold and turn steering wheel. GOAL NOT MET Patient is able to assist minimally with left arm at the bottom of the steering wheel. CONTINUE GOAL   Pt. Will be able to use LUE for gardening tasks with minimal to no pain or difficulty. GOAL NOT MET, CONTINUE GOAL     Patient Education:   []  HEP/Education Completed: Discussed progress towards goals and POC to continue OT.      [employer]bridge for HEP: pendulums all directions, scap pinches, table slides, AROM elbow, forearm, wrist and hand - handouts given  8/11/22 added leaning over rows, supine dowel for chest press, ER to HEP    [x]  No new Education completed  [] Reviewed Prior HEP; activity modifications with driving to [place] tomorrow. Reviewed overall technique with distraction to help with pain   []  Patient verbalized and/or demonstrated understanding of education provided. []  Patient unable to verbalize and/or demonstrate understanding of education provided. Will continue education. [x]  Barriers to learning: none    PLAN:  Treatment Recommendations: Strengthening, Range of Motion, Manual Therapy - Soft Tissue Mobilization, Home Exercise Program, and Self-Care Education and Training per treatment protocol    []  Plan of care initiated. Plan to see patient 2 times per week for 12 weeks to address the treatment planned outlined above.   []  Continue with current plan of care  [x]  Modify plan of care as follows:  Plan to see patient 1 times per week for 5 weeks to address the treatment plan outlined above.   []  Hold pending physician visit  []  Discharge    Time In 1630   Time Out 1708   Timed Code Minutes: 38 min   Total Treatment Time: 38 min       Hari Méndez OTR/L [phone]

## 2022-12-06 ENCOUNTER — HOSPITAL ENCOUNTER (OUTPATIENT)
Dept: OCCUPATIONAL THERAPY | Age: 47
Setting detail: THERAPIES SERIES
Discharge: HOME | End: 2022-12-06
Payer: COMMERCIAL

## 2022-12-06 PROCEDURE — 97110 THERAPEUTIC EXERCISES: CPT

## 2022-12-06 PROCEDURE — 97140 MANUAL THERAPY 1/> REGIONS: CPT

## 2022-12-06 NOTE — PROGRESS NOTES
[address]  [] EVALUATION  [x] DAILY NOTE (LAND) [] DAILY NOTE (AQUATIC )   [] PROGRESS NOTE [] DISCHARGE NOTE    [x] OUTPATIENT REHABILITATION [facility]   [] [place] [address]    [] [facility]   [] [facility]    Date: 2022  Patient Name:  Craig Duran  : 1975  MRN: [id number]  CSN: [id number]    Referring Practitioner Jared Roth MD   Diagnosis Left Rotator cuff tear   Treatment Diagnosis Decreased ROM, strength, and pain Left UE s/p RCR   Date of Evaluation 22      Functional Outcome Measure Used UEFS   Functional Outcome Score 22 (22); 38/80 (10/19/22)      Insurance: Primary: Payor: R /  /  / ,   Secondary:    Authorization Information: No precert needed, 60 vs. Combined of OT/PT/ST   Visit # 22, 10 (PN COMPLETED 10/19/22; 22)   Visits Allowed: 24    (36/60 visits used in  PT/OT combined? ? )   Recertification Date: 2022   Physician Follow-Up: 2022   Physician Orders: Use type I RCR protocol, no bicep resistance x6 weeks   Pertinent History: Pt. Was here for OT for left shoulder pain earlier this year but did not have a significant improvement. Pt. Then had further testing which showed a rotator cuff tear. Pt. Underwent surgery 7/15/22 for RCR and Bicep tenodesis. SUBJECTIVE: Patient reports her shoulder is sore and tenderness. Patient saw MD yesterday, reports her shoulder is still healing so soreness is to be expected. Continue therapy, lift 2# as tolerated, will no return to MD unless needed. OBJECTIVE:  TREATMENT   Precautions:  Follow Dr. Nba Zheng Type 1 RCR protocol with     Pain: 4-5/10 pointing upper trap and anterior shoulder      X in shaded column indicates Activity Completed Today   Modalities Parameters/  Location  Notes/Comments                     Manual Therapy Time/  Technique  Notes/Comments   PROM left shoulder all planes to tolerance  x Good motions all planes noted, no audible popping, overall fair tolerance   Sidelying IASTM to L upper trap, levator scap, medial scap border, subscapularis, pec minor,VERY gently cross friction on bicep      Cross body posterior capsule stretch by therapist       [facility] mobilizations  x    Exercises   Sets/  Sec Reps  Notes/Comments   AAROM left elbow; AROM forearm, wrist and hand 1 10 ea     Seated backward shoulder rolls, scapular retraction 1 10  ea  Warm up this date    Table slides 1 10     Pulleys for shoulder flexion to tolerance 1 10 x Warm up this date  with scapular retraction    Supine dowel julius ER 1 10 ea     Supine active shoulder flexion from side to tolerance no weight,  ceiling circles fwd/bwd with 1# 1 12 x Increased reps    Supine SA punch 1# 1 12 x Increased reps    Supine cross body stretch by pt. 15 sec 5 x    Bent over Row to neutral only 2#, abd 1# 1 10 x    Standing bicep curl 2# hand in neutral  1 12 x Increased reps    Sidelying ER with elbow at side 1#, abduction 1#  1 10 ea x Pt. Requested to trial 2# with ER, challenge and fatigue noted, discomfort. Decreased ROM for ER with increased weight      Orange band extension to neutral  1 10 x Initiated this date. Good tolerance, min cues technique. Added to HEP    Supine AROM horizontal abd/add 1 10 x Initiated this date. Overall fair tolerance, limited ROM but no pain noted, few popping occasions noted    Biodex 100 speed x3 min.  Backward only   x Cool down this date   Bicep curl, tricep extension supine peach band  1 10 ea  Fair tolerance, grimace demonstrated    Seated edge of mat- B ER  1 10  With 1# B hands this date- good tolerance, fatigue noted, no pain    Initiated sleeper stretch  15 sec 3     Activities Time    Notes/Comments   kinesiotape 2 \"I\" strips anterior to posterior for mechanical correction                     Specific Interventions Next Treatment: Per Dr. Steve Flores type 1 RCR protocol    Activity/Treatment Tolerance:  [x]  Patient tolerated treatment well  []  Patient limited by fatigue  []  Patient limited by pain   []  Patient limited by other medical complications  []  Other:     Assessment: Patient progressing slowly toward goals. Continued weakness noted posterior shoulder but overall fair tolerance throughout gentle strengthening, with 1-2 popping noted from patient. 1 remaining session, with plan to discharge and transition to HEP. Areas for Improvement: impaired ROM, impaired strength, and pain  Prognosis: good    GOALS:  Patient Goal: to decrease pain in left shoulder for return to normal activities    Short Term Goals:  Time Frame: 4 weeks  Pt. Will demo independence with HEP for LUE for improved motion, decreased pain and eventual strength for return to normal routines. GOAL MET Patient is completing HEP for stretching and ROM. No strengthening yet with theraband. CONTINUE GOAL     Pt. Will demo improved PROM left shoulder to flexion= 145, ER = 60 at 45 degrees abd, IR 60 at 45 degrees abd, and ABD= 120 for ease with AROM for bathing and dressing. GOAL NOT MET Flexion= 140, ER= 50 at 45 degrees abd, IR= 68 at 45 degrees abd, abduction= 120 degrees at progress note. Bathing and dressing is easier with modifications continuing. CONTINUE GOAL     Pt. Will report decreased pain left shoulder to no greater than 5/10 with PROM for ease with HEP and sleeping. GOAL NOT MET Patient reports she is waking up less but it is positional. When she does wake up in pain it is approximately 7/10. CONTINUE GOAL     4. NEW GOAL: Patient will demo improved AROM left shoulder to flexion= 125, ER= 40, abduction= 95, IR thumb tip= to waistband behind the back for ease with reaching into overhead cupboard. Long Term Goals:  Time Frame: 6 weeks  Pt. Will demo functional AROM left shoulder to be able to reach into first shelf of upper cupboard to obtain lightweight items for cooking. GOAL NOT MET Patient is able to reach to eye level, not overhead. CONTINUE GOAL   Pt. Will be able to drive using LUE to hold and turn steering wheel. GOAL NOT MET Patient is able to assist minimally with left arm at the bottom of the steering wheel. CONTINUE GOAL   Pt. Will be able to use LUE for gardening tasks with minimal to no pain or difficulty. GOAL NOT MET, CONTINUE GOAL     Patient Education:   [x]  HEP/Education Completed: Discussed progress towards goals and POC to continue OT. [address] for HEP: pendulums all directions, scap pinches, table slides, AROM elbow, forearm, wrist and hand - handouts given  8/11/22 added leaning over rows, supine dowel for chest press, ER to HEP  12/6/22- orange band extension to neutral   []  No new Education completed  [x]  Reviewed Prior HEP; activity modifications with household tasks, plans to transition to HEP with postural awareness and awareness on appropriate strengthening progressions   []  Patient verbalized and/or demonstrated understanding of education provided. []  Patient unable to verbalize and/or demonstrate understanding of education provided. Will continue education. [x]  Barriers to learning: none    PLAN:  Treatment Recommendations: Strengthening, Range of Motion, Manual Therapy - Soft Tissue Mobilization, Home Exercise Program, and Self-Care Education and Training per treatment protocol    []  Plan of care initiated. Plan to see patient 2 times per week for 12 weeks to address the treatment planned outlined above.   []  Continue with current plan of care  [x]  Modify plan of care as follows:  Plan to see patient 1 times per week for 5 weeks to address the treatment plan outlined above.   []  Hold pending physician visit  []  Discharge    Time In 1616   Time Out 1656   Timed Code Minutes: 40 min   Total Treatment Time: 40 min       Silvia GRIER/CA #[id number]

## 2022-12-13 ENCOUNTER — HOSPITAL ENCOUNTER (OUTPATIENT)
Dept: OCCUPATIONAL THERAPY | Age: 47
Setting detail: THERAPIES SERIES
Discharge: HOME | End: 2022-12-13
Payer: COMMERCIAL

## 2022-12-13 PROCEDURE — 97110 THERAPEUTIC EXERCISES: CPT

## 2022-12-13 NOTE — DISCHARGE SUMMARY
[address]  [] EVALUATION  [] DAILY NOTE (LAND) [] DAILY NOTE (AQUATIC )   [] PROGRESS NOTE [x] DISCHARGE NOTE    [x] OUTPATIENT REHABILITATION [facility]   [] [place] [address]    [] [facility]   [] [facility]    Date: 2022  Patient Name:  Yasmin Sales  : 1975  MRN: [id number]  CSN: [id number]    Referring Practitioner Boston Cameron MD   Diagnosis Left Rotator cuff tear   Treatment Diagnosis Decreased ROM, strength, and pain Left UE s/p RCR   Date of Evaluation 22      Functional Outcome Measure Used UEFS   Functional Outcome Score 22/80 (22); 38/80 (10/19/22), 45/80(22)      Insurance: Primary: Payor: UMR /  /  / ,   Secondary:    Authorization Information: No precert needed, 60 vs. Combined of OT/PT/ST   Visit # 24   Visits Allowed: 24    (36/60 visits used in  PT/OT combined? ? )   Recertification Date: 2022   Physician Follow-Up: 2022   Physician Orders: Use type I RCR protocol, no bicep resistance x6 weeks   Pertinent History: Pt. Was here for OT for left shoulder pain earlier this year but did not have a significant improvement. Pt. Then had further testing which showed a rotator cuff tear. Pt. Underwent surgery 7/15/22 for RCR and Bicep tenodesis. SUBJECTIVE: Patient presents to therapy reporting she is ready to be discharged at this time, feels she is still weak and has limitations and may need further therapy in the future  OBJECTIVE:  TREATMENT   Precautions:  Follow Dr. Khris Gates Type 1 RCR protocol with     Pain: 0/10 at rest, 4-5/10 pointing upper trap and anterior shoulder      X in shaded column indicates Activity Completed Today   Modalities Parameters/  Location  Notes/Comments                     Manual Therapy Time/  Technique  Notes/Comments   PROM left shoulder all planes to tolerance   Good motions all planes noted, no audible popping, overall fair tolerance   Sidelying IASTM to L upper trap, levator scap, medial scap border, subscapularis, pec minor,VERY gently cross friction on bicep      Cross body posterior capsule stretch by therapist       [facility] mobilizations      Exercises   Sets/  Sec Reps  Notes/Comments   AAROM left elbow; AROM forearm, wrist and hand 1 10 ea     Seated backward shoulder rolls, scapular retraction 1 10  ea  Warm up this date    Table slides 1 10     Pulleys for shoulder flexion to tolerance 1 10  Warm up this date  with scapular retraction    Supine dowel julius ER 1 10 ea     Supine active shoulder flexion from side to tolerance no weight,  ceiling circles fwd/bwd with 1# 1 12  1 lb for shoulder flexion this date   Supine SA punch 1# 1 12  Increased reps    Supine cross body stretch by pt. 15 sec 5     Bent over Row to neutral only 2#, abd 1# 1 10     Standing bicep curl 2# hand in neutral  1 12  Increased reps    Sidelying ER with elbow at side 1#, abduction 1#  1 10 ea  Pt. Requested to trial 2# with ER, challenge and fatigue noted, discomfort. Decreased ROM for ER with increased weight      Orange band extension to neutral  1 10  Initiated this date. Good tolerance, min cues technique. Added to HEP    Supine AROM horizontal abd/add 1 10  Initiated this date. Overall fair tolerance, limited ROM but no pain noted, few popping occasions noted    Biodex 100 speed x3 min.  Backward only    Cool down this date   Bicep curl, tricep extension supine peach band  1 10 ea  Fair tolerance, grimace demonstrated    Seated edge of mat- B ER  1 10  With 1# B hands this date- good tolerance, fatigue noted, no pain    Initiated sleeper stretch  15 sec 3     HEP Review/consolidation-see HEP section below for all exercises performed this date and to be completed for HEP at home 3 days per week x10 reps each 5 reps each exercise to ensure appropriate technique x    Activities Time    Notes/Comments   kinesiotape 2 \"I\" strips anterior to posterior for mechanical correction                     Specific Interventions Next Treatment: Per Dr. Nicci Joshua type 1 RCR protocol    Activity/Treatment Tolerance:  [x]  Patient tolerated treatment well  []  Patient limited by fatigue  []  Patient limited by pain   []  Patient limited by other medical complications  []  Other:     Assessment: Patient referred to occupational therapy following L RCR and has participated in 24 sessions over the last 5 months addressing pain, ROM, and strength deficits in addition to limitations in ADL, iADL, and work performance compared to evaluation. Pt has met several of her goals and demos significant improvements in ROM and strength compared to evaluation. Pt also reports it is easier to complete ADL, IADL, and work tasks and improved her UEFI score by to 45/80 compared to 22/80 at evaluation. Patient has utilized all authorized therapy visits and is agreeable to discharge to [facility] this date. Pt has deficits remaining, is still on a 2 lb lifting restriction per her physician, and notes pain with sleeping and reaching, especially in abduction. Pt reports she will likely need continued therapy to further progress in these impairment areas for increased ease of ADL, IADL and work tasks and reduced pain but will wait until her insurance allows for more next year after she discusses with her physician. Areas for Improvement: impaired ROM, impaired strength, and pain  Prognosis: good    GOALS:  Patient Goal: to decrease pain in left shoulder for return to normal activities    Short Term Goals:  Time Frame: 4 weeks  Pt. Will demo independence with HEP for LUE for improved motion, decreased pain and eventual strength for return to normal routines. GOAL MET reports compliance and reviewed/consolidated this date DISCHARGE GOAL    Pt. Will demo improved PROM left shoulder to flexion= 145, ER = 60 at 45 degrees abd, IR 60 at 45 degrees abd, and ABD= 120 for ease with AROM for bathing and dressing.  GOAL MET Flexion=160, ER=65, IR=70, ABD=123 DISCHARGE GOAL    Pt. Will report decreased pain left shoulder to no greater than 5/10 with PROM for ease with HEP and sleeping. GOAL PARTIALLY MET, minimal pain with flexion, ER, IR PROM, high levels of pain with abduction PROM, minimal pain reported with HEP, pt reports she is having trouble sleeping due to pain in her shoulder. DISCHARGE GOAL    4. NEW GOAL: Patient will demo improved AROM left shoulder to flexion= 125, ER= 40, abduction= 95, IR thumb tip= to waistband behind the back for ease with reaching into overhead cupboard. GOAL MET Skuhrbx=656, ER=41, Tkajkpjnb=346, IR=Tip of thumb to waistline, pt able to reach overhead for cupboard items this date DISCHARGE GOAL    Long Term Goals:  Time Frame: 6 weeks  Pt. Will demo functional AROM left shoulder to be able to reach into first shelf of upper cupboard to obtain lightweight items for cooking. GOAL MET, able to reach all shelves this date, discomfort with tall shelf DISCHARGE GOAL  Pt. Will be able to drive using LUE to hold and turn steering wheel. GOAL PARTIALLY MET is able to drive with L UE but reports discomfort if she turns the wheel to or holds wheel in 12 o'clock position, DISCHARGE GOAL  Pt. Will be able to use LUE for gardening tasks with minimal to no pain or difficulty. GOAL NOT MET, pt is not gardening as it is winter time, reports her mother completed these tasks in the fall due to L shoulder pain DISCHARGE GOAL    Patient Education:   [x]  HEP/Education Completed: Discussed progress towards goals and POC to continue OT.      [address] for HEP: pendulums all directions, scap pinches, table slides, AROM elbow, forearm, wrist and hand - handouts given  8/11/22 added leaning over rows, supine dowel for chest press, ER to HEP  12/6/22- orange band extension to neutral   Access Code: [email or web address]  Supine Shoulder Flexion with Free Weight - 1 x daily -3 x weekly - 3 sets - 10 reps  Sidelying Shoulder ER with Towel and Dumbbell - 1 x daily - 3 x weekly - 3 sets - 10 reps  Sidelying Shoulder Abduction Palm Forward - 1 x daily - 3 x weekly - 3 sets - 10 reps  Shoulder Extension with Resistance - 1 x daily - 3 x weekly - 3 sets - 10 reps  Bent Over Single Arm Shoulder Row with Dumbbell - 1 x daily - 3 x weekly - 3 sets - 10 reps  Single Arm Bent Over Shoulder Horizontal Abduction with Dumbbell - Palm Down - 1 x daily - 3 x weekly - 3 sets - 10 reps  Standing Bicep Curls Supinated with Dumbbells - 1 x daily - 3 x weekly - 3 sets - 10 reps  Supine Scapular Protraction in Flexion with Dumbbells - 1 x daily - 7 x weekly - 3 sets - 10 reps  Supine Shoulder Circles with Weight - 1 x daily - 3 x weekly - 3 sets - 10 reps  Standing Shoulder Horizontal Abduction with Resistance - 1 x daily - 3 x weekly - 3 sets - 10 reps  Theraputty grasp, pinch, pull 1 min each  []  No new Education completed  []  Reviewed Prior HEP; activity modifications with household tasks, plans to transition to HEP with postural awareness and awareness on appropriate strengthening progressions   []  Patient verbalized and/or demonstrated understanding of education provided. []  Patient unable to verbalize and/or demonstrate understanding of education provided. Will continue education. [x]  Barriers to learning: none    PLAN:  Treatment Recommendations: Strengthening, Range of Motion, Manual Therapy - Soft Tissue Mobilization, Home Exercise Program, and Self-Care Education and Training per treatment protocol    []  Plan of care initiated. Plan to see patient 2 times per week for 12 weeks to address the treatment planned outlined above.   []  Continue with current plan of care  []  Modify plan of care as follows:  Plan to see patient 1 times per week for 5 weeks to address the treatment plan outlined above.   []  Hold pending physician visit  [x]  Discharge    Time In 1615   Time Out 1655   Timed Code Minutes: 40 min   Total Treatment Time: 40 min       Yaa Kirk [place] [facility], [address] [facility], OTR/L [id number]

## 2023-01-29 NOTE — TELEPHONE ENCOUNTER
Received call from Matteo Shaffer  at [facility] with [facility]. Subjective: Caller states \" I had injections yesterday and I am having pain. The office is closed today. \"    Left Si injection #2 (Left )        Current Symptoms:   +\" I can barely put weight on this leg. I have had injections before and it has never felt like this. \"   +left leg \"left lower back\" pain   +\"I called the number and the office is closed. I left a message- they said they might not get it until Monday\"   +no medications to take -per pt   -denies numbness or weakness   -denies abdominal pain   -denies redness around the injection site   -denies chest pain or shortness of breath     Onset: 1 day ago; worsening    Associated Symptoms: NA    Pain Severity: 9/10; sharp; waxing and waning    Temperature: Denies     What has been tried: nothing     LMP: NA Pregnant: NA    Recommended disposition: See HCP within 4 Hours (or PCP triage)     Advised pt to follow up in [facility] or ED if unable to see PCP today   No current PCP     Care advice provided, patient verbalizes understanding; denies any other questions or concerns; instructed to call back for any new or worsening symptoms. Patient/Caller agrees with recommended disposition; writer provided warm transfer to [facility]  at [facility] for appointment scheduling     Attention Provider: Thank you for allowing me to participate in the care of your patient. The patient was connected to triage in response to information provided to the ECC/PSC. Please do not respond through this encounter as the response is not directed to a shared pool.       Reason for Disposition   [1] SEVERE back pain (e.g., excruciating, unable to do any normal activities) AND [2] not improved 2 hours after pain medicine    Protocols used: BACK PAIN-ADULT-AH H/o left foot AVM w/ chronic foot ulcer w/ multiple prior embolizations and a prior OR debridement 10/2022, s/p IV Meropenem/Vanco from 11/07/2022 to 12/29/2022  - s/p additiona direct stick embolization 01/24/2023 w/ Dr. Teran   - ID consulted post-procedure for co-management and recommended IV Meropenem/Vancomycin  - previously on Vancomycin 1500 mg IV q8 hours and Meropenem 2 grams IV q8 hours   - Vanc Trough on 01/28/2023 at 7 PM 18.2 (goal 11-16)  - currently continued on Vancomycin 1250 mg IV q8 hours and Meropenem 2 grams IV q8 hours  - WBC 12.33 on 01/29/2023 (improving, 13.6 on 01/28/2023)  - ID continuing to follow and evaluating wound daily to determine antibiotic durations, appreciate recs   - continue Hydromorphone 2 mg q4 hours PRN (severe pain)  - ordered for Naloxone IV PRN

## 2023-08-13 ENCOUNTER — HOSPITAL ENCOUNTER (EMERGENCY)
Age: 48
Discharge: HOME | End: 2023-08-13
Payer: COMMERCIAL

## 2023-08-13 VITALS
SYSTOLIC BLOOD PRESSURE: 152 MMHG | DIASTOLIC BLOOD PRESSURE: 99 MMHG | HEART RATE: 98 BPM | OXYGEN SATURATION: 98 % | TEMPERATURE: 98.3 F | RESPIRATION RATE: 16 BRPM

## 2023-08-13 DIAGNOSIS — N39.0 URINARY TRACT INFECTION IN FEMALE: Primary | ICD-10-CM

## 2023-08-13 LAB
BILIRUB UR STRIP.AUTO-MCNC: NEGATIVE MG/DL
CHARACTER UR: CLEAR
COLOR: YELLOW
GLUCOSE UR QL STRIP.AUTO: NEGATIVE MG/DL
KETONES UR QL STRIP.AUTO: NEGATIVE
NITRITE UR QL STRIP.AUTO: NEGATIVE
PH UR STRIP.AUTO: 6.5 [PH] (ref 5–9)
PROT UR STRIP.AUTO-MCNC: NEGATIVE MG/DL
RBC #/AREA URNS HPF: NEGATIVE /[HPF]
SP GR UR STRIP.AUTO: 1.01 (ref 1–1.03)
UROBILINOGEN, URINE: 0.2 EU/DL (ref 0.2–1)
WBC #/AREA URNS HPF: ABNORMAL /[HPF]

## 2023-08-13 PROCEDURE — 99202 OFFICE O/P NEW SF 15 MIN: CPT

## 2023-08-13 PROCEDURE — 99213 OFFICE O/P EST LOW 20 MIN: CPT | Performed by: NURSE PRACTITIONER

## 2023-08-13 PROCEDURE — 87086 URINE CULTURE/COLONY COUNT: CPT

## 2023-08-13 PROCEDURE — 81003 URINALYSIS AUTO W/O SCOPE: CPT

## 2023-08-13 RX ORDER — PHENAZOPYRIDINE HYDROCHLORIDE 100 MG/1
100 TABLET, FILM COATED ORAL 3 TIMES DAILY PRN
Qty: 9 TABLET | Refills: 0 | Status: SHIPPED | OUTPATIENT
Start: 2023-08-13 | End: 2023-08-16

## 2023-08-13 RX ORDER — NITROFURANTOIN 25; 75 MG/1; MG/1
100 CAPSULE ORAL 2 TIMES DAILY
Qty: 10 CAPSULE | Refills: 0 | Status: SHIPPED | OUTPATIENT
Start: 2023-08-13 | End: 2023-08-18

## 2023-08-13 ASSESSMENT — ENCOUNTER SYMPTOMS
SHORTNESS OF BREATH: 0
VOMITING: 0
BACK PAIN: 1
TROUBLE SWALLOWING: 0
ABDOMINAL DISTENTION: 0
NAUSEA: 0
ABDOMINAL PAIN: 1

## 2023-08-13 NOTE — ED PROVIDER NOTES
[address]  Urgent Care Encounter      CHIEF COMPLAINT       Chief Complaint   Patient presents with    Abdominal Pain    Back Pain    Chills    Fever       Nurses Notes reviewed and I agree except as noted in the HPI. HISTORY OF PRESENT ILLNESS   Tiny Hernandez is a 46 y.o. female who presents for evaluation of possible UTI. Onset of symptoms over the past few days, unchanged. Patient complains of lower abdominal pain/back pain. Associated fever, subjective. Complains of chills. No hematuria. No changes in bowel habits. History of renal stone, states feels possibly similar. No improvement with current treatment. REVIEW OF SYSTEMS     Review of Systems   Constitutional:  Negative for fever. HENT:  Negative for trouble swallowing. Respiratory:  Negative for shortness of breath. Cardiovascular:  Negative for chest pain. Gastrointestinal:  Positive for abdominal pain. Negative for abdominal distention, nausea and vomiting. Genitourinary:  Positive for frequency. Negative for decreased urine volume, difficulty urinating, dysuria, flank pain, genital sores, hematuria, menstrual problem, pelvic pain, urgency, vaginal bleeding, vaginal discharge and vaginal pain. Musculoskeletal:  Positive for back pain. Negative for neck pain and neck stiffness. Skin:  Negative for rash. Neurological:  Negative for headaches. Hematological:  Negative for adenopathy. Psychiatric/Behavioral:  Negative for sleep disturbance.       PAST MEDICAL HISTORY         Diagnosis Date    Allergic rhinitis     Allergy     Anxiety     Arthritis     Asthma     Bipolar disorder ([address] [facility])     Bronchitis 2006    Cancer [facility]) 1992    cervical    Depression     Diabetes mellitus ([address] [facility])     History of bronchitis     History of kidney stones     Hyperlipidemia     Hypertension     Ligament tear     left foot    Osteoarthritis     Osteoporosis     PONV (postoperative nausea and vomiting)     Psoriasis

## 2023-08-13 NOTE — ED NOTES
To [facility] with complaints of abd pain, back pain, nausea, chills, fever. States she thinks she has a stone again. Last past a stone about 2 years ago.  States she can not pee very much     Christian Jasmine RN  08/13/23 [id number]

## 2023-08-15 LAB
BACTERIA UR CULT: ABNORMAL
ORGANISM: ABNORMAL

## 2023-08-16 ENCOUNTER — APPOINTMENT (OUTPATIENT)
Dept: CT IMAGING | Age: 48
End: 2023-08-16
Payer: COMMERCIAL

## 2023-08-16 ENCOUNTER — HOSPITAL ENCOUNTER (EMERGENCY)
Age: 48
Discharge: HOME | End: 2023-08-16
Attending: STUDENT IN AN ORGANIZED HEALTH CARE EDUCATION/TRAINING PROGRAM
Payer: COMMERCIAL

## 2023-08-16 VITALS
BODY MASS INDEX: 28.93 KG/M2 | SYSTOLIC BLOOD PRESSURE: 121 MMHG | RESPIRATION RATE: 18 BRPM | DIASTOLIC BLOOD PRESSURE: 83 MMHG | OXYGEN SATURATION: 96 % | HEIGHT: 66 IN | WEIGHT: 180 LBS | HEART RATE: 79 BPM | TEMPERATURE: 98.3 F

## 2023-08-16 DIAGNOSIS — R10.32 LEFT LOWER QUADRANT ABDOMINAL PAIN: ICD-10-CM

## 2023-08-16 DIAGNOSIS — R10.9 FLANK PAIN: Primary | ICD-10-CM

## 2023-08-16 LAB
ALBUMIN SERPL BCG-MCNC: 4.5 G/DL (ref 3.5–5.1)
ALP SERPL-CCNC: 82 U/L (ref 38–126)
ALT SERPL W/O P-5'-P-CCNC: 58 U/L (ref 11–66)
ANION GAP SERPL CALC-SCNC: 12 MEQ/L (ref 8–16)
AST SERPL-CCNC: 39 U/L (ref 5–40)
BASOPHILS ABSOLUTE: 0 THOU/MM3 (ref 0–0.1)
BASOPHILS NFR BLD AUTO: 0.4 %
BILIRUB SERPL-MCNC: 0.4 MG/DL (ref 0.3–1.2)
BILIRUB UR QL STRIP.AUTO: NEGATIVE
BUN SERPL-MCNC: 8 MG/DL (ref 7–22)
CALCIUM SERPL-MCNC: 9.7 MG/DL (ref 8.5–10.5)
CHARACTER UR: CLEAR
CHLORIDE SERPL-SCNC: 102 MEQ/L (ref 98–111)
CO2 SERPL-SCNC: 28 MEQ/L (ref 23–33)
COLOR: ABNORMAL
CREAT SERPL-MCNC: 0.9 MG/DL (ref 0.4–1.2)
DEPRECATED RDW RBC AUTO: 39.8 FL (ref 35–45)
EOSINOPHIL NFR BLD AUTO: 3.6 %
EOSINOPHILS ABSOLUTE: 0.2 THOU/MM3 (ref 0–0.4)
ERYTHROCYTE [DISTWIDTH] IN BLOOD BY AUTOMATED COUNT: 13.1 % (ref 11.5–14.5)
GFR SERPL CREATININE-BSD FRML MDRD: > 60 ML/MIN/1.73M2
GLUCOSE SERPL-MCNC: 145 MG/DL (ref 70–108)
GLUCOSE UR QL STRIP.AUTO: NEGATIVE MG/DL
HCT VFR BLD AUTO: 44 % (ref 37–47)
HGB BLD-MCNC: 13.6 GM/DL (ref 12–16)
HGB UR QL STRIP.AUTO: NEGATIVE
IMM GRANULOCYTES # BLD AUTO: 0.01 THOU/MM3 (ref 0–0.07)
IMM GRANULOCYTES NFR BLD AUTO: 0.2 %
KETONES UR QL STRIP.AUTO: NEGATIVE
LYMPHOCYTES ABSOLUTE: 2 THOU/MM3 (ref 1–4.8)
LYMPHOCYTES NFR BLD AUTO: 42.1 %
MCH RBC QN AUTO: 26.2 PG (ref 26–33)
MCHC RBC AUTO-ENTMCNC: 30.9 GM/DL (ref 32.2–35.5)
MCV RBC AUTO: 84.6 FL (ref 81–99)
MONOCYTES ABSOLUTE: 0.5 THOU/MM3 (ref 0.4–1.3)
MONOCYTES NFR BLD AUTO: 9.9 %
NEUTROPHILS NFR BLD AUTO: 43.8 %
NITRITE UR QL STRIP: NEGATIVE
NRBC BLD AUTO-RTO: 0 /100 WBC
OSMOLALITY SERPL CALC.SUM OF ELEC: 284 MOSMOL/KG (ref 275–300)
PH UR STRIP.AUTO: 6 [PH] (ref 5–9)
PLATELET # BLD AUTO: 177 THOU/MM3 (ref 130–400)
PMV BLD AUTO: 9.7 FL (ref 9.4–12.4)
POTASSIUM SERPL-SCNC: 3.7 MEQ/L (ref 3.5–5.2)
PROT SERPL-MCNC: 7.8 G/DL (ref 6.1–8)
PROT UR STRIP.AUTO-MCNC: NEGATIVE MG/DL
RBC # BLD AUTO: 5.2 MILL/MM3 (ref 4.2–5.4)
SEGMENTED NEUTROPHILS ABSOLUTE COUNT: 2.1 THOU/MM3 (ref 1.8–7.7)
SODIUM SERPL-SCNC: 142 MEQ/L (ref 135–145)
SP GR UR REFRACT.AUTO: 1.01 (ref 1–1.03)
UROBILINOGEN, URINE: 0.2 EU/DL (ref 0–1)
WBC # BLD AUTO: 4.7 THOU/MM3 (ref 4.8–10.8)
WBC #/AREA URNS HPF: NEGATIVE /[HPF]

## 2023-08-16 PROCEDURE — 6360000002 HC RX W HCPCS: Performed by: STUDENT IN AN ORGANIZED HEALTH CARE EDUCATION/TRAINING PROGRAM

## 2023-08-16 PROCEDURE — 36415 COLL VENOUS BLD VENIPUNCTURE: CPT

## 2023-08-16 PROCEDURE — 99284 EMERGENCY DEPT VISIT MOD MDM: CPT

## 2023-08-16 PROCEDURE — 80053 COMPREHEN METABOLIC PANEL: CPT

## 2023-08-16 PROCEDURE — 74176 CT ABD & PELVIS W/O CONTRAST: CPT

## 2023-08-16 PROCEDURE — 81003 URINALYSIS AUTO W/O SCOPE: CPT

## 2023-08-16 PROCEDURE — 96374 THER/PROPH/DIAG INJ IV PUSH: CPT

## 2023-08-16 PROCEDURE — 85025 COMPLETE CBC W/AUTO DIFF WBC: CPT

## 2023-08-16 RX ORDER — ORPHENADRINE CITRATE 100 MG/1
100 TABLET, EXTENDED RELEASE ORAL 2 TIMES DAILY
Qty: 20 TABLET | Refills: 0 | Status: SHIPPED | OUTPATIENT
Start: 2023-08-16 | End: 2023-08-26

## 2023-08-16 RX ORDER — DICYCLOMINE HYDROCHLORIDE 10 MG/1
10 CAPSULE ORAL 4 TIMES DAILY
Qty: 120 CAPSULE | Refills: 0 | Status: SHIPPED | OUTPATIENT
Start: 2023-08-16

## 2023-08-16 RX ORDER — FENTANYL CITRATE 50 UG/ML
50 INJECTION, SOLUTION INTRAMUSCULAR; INTRAVENOUS ONCE
Status: COMPLETED | OUTPATIENT
Start: 2023-08-16 | End: 2023-08-16

## 2023-08-16 RX ADMIN — FENTANYL CITRATE 50 MCG: 50 INJECTION, SOLUTION INTRAMUSCULAR; INTRAVENOUS at 17:41

## 2023-08-16 ASSESSMENT — PAIN SCALES - GENERAL
PAINLEVEL_OUTOF10: 4
PAINLEVEL_OUTOF10: 7
PAINLEVEL_OUTOF10: 6

## 2023-08-16 ASSESSMENT — PAIN DESCRIPTION - LOCATION: LOCATION: ABDOMEN;FLANK

## 2023-08-16 ASSESSMENT — PAIN DESCRIPTION - ORIENTATION: ORIENTATION: RIGHT

## 2023-08-16 ASSESSMENT — PAIN - FUNCTIONAL ASSESSMENT
PAIN_FUNCTIONAL_ASSESSMENT: 0-10
PAIN_FUNCTIONAL_ASSESSMENT: 0-10

## 2023-08-16 NOTE — ED NOTES
Pt back from CT and vs reassessed. RR easy and unlabored.  Pt resting in bed alert and medicated per [facility]     Danny Nunn RN  08/16/23 [id number]

## 2023-08-16 NOTE — ED TRIAGE NOTES
Pt presents to the ED through lobby with c/o flank and abdominal pain. Pt states she was seen at urgent care yesterday and told she has a UTI. Pt states she was called yesterday and told to stop taking her antibiotic for possible UTI after taking it for two days. Pt states pain has worsened since. Pt started in right flank and is now going around to her RLQ. Pt states she has a history of kidney stones- last being 2 years ago. Denies dysuria or hematuria. Denies vomiting but states she has been nauseous.

## 2023-08-16 NOTE — ED NOTES
Pt and vs reassessed. RR easy and unlabored. Pt resting in bed alert and states pain has decreased.  Pt stable at this time     Juan Macedo RN  08/16/23 5117
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
## 2024-03-07 ENCOUNTER — HOSPITAL ENCOUNTER (OUTPATIENT)
Age: 49
Discharge: HOME | End: 2024-03-07
Payer: COMMERCIAL

## 2024-03-07 LAB
ALBUMIN SERPL BCG-MCNC: 4.6 G/DL (ref 3.5–5.1)
ALP SERPL-CCNC: 63 U/L (ref 38–126)
ALT SERPL W/O P-5'-P-CCNC: 18 U/L (ref 11–66)
ANION GAP SERPL CALC-SCNC: 13 MEQ/L (ref 8–16)
AST SERPL-CCNC: 18 U/L (ref 5–40)
BILIRUB CONJ SERPL-MCNC: < 0.2 MG/DL (ref 0–0.3)
BILIRUB SERPL-MCNC: 0.4 MG/DL (ref 0.3–1.2)
BUN SERPL-MCNC: 9 MG/DL (ref 7–22)
CALCIUM SERPL-MCNC: 9.4 MG/DL (ref 8.5–10.5)
CHLORIDE SERPL-SCNC: 102 MEQ/L (ref 98–111)
CHOLEST SERPL-MCNC: 212 MG/DL (ref 100–199)
CO2 SERPL-SCNC: 25 MEQ/L (ref 23–33)
CREAT SERPL-MCNC: 0.6 MG/DL (ref 0.4–1.2)
GFR SERPL CREATININE-BSD FRML MDRD: > 60 ML/MIN/1.73M2
GLUCOSE SERPL-MCNC: 179 MG/DL (ref 70–108)
HDLC SERPL-MCNC: 44 MG/DL
LDLC SERPL CALC-MCNC: 136 MG/DL
POTASSIUM SERPL-SCNC: 3.6 MEQ/L (ref 3.5–5.2)
PROT SERPL-MCNC: 8.2 G/DL (ref 6.1–8)
SODIUM SERPL-SCNC: 140 MEQ/L (ref 135–145)
TRIGL SERPL-MCNC: 161 MG/DL (ref 0–199)

## 2024-03-07 PROCEDURE — 80053 COMPREHEN METABOLIC PANEL: CPT

## 2024-03-07 PROCEDURE — 82248 BILIRUBIN DIRECT: CPT

## 2024-03-07 PROCEDURE — 36415 COLL VENOUS BLD VENIPUNCTURE: CPT

## 2024-03-07 PROCEDURE — 80061 LIPID PANEL: CPT

## 2024-06-11 NOTE — PROGRESS NOTES
[facility] [place] [facility]  [facility] EAR, NOSE AND THROAT  [address]  [address]  [place] [id number]  Dept: [phone]  Dept Fax: [phone]  Loc: [phone]    Carlota Zhou is a 49 y.o. female who was referred by Sonja Diaz APRN * for:  Chief Complaint   Patient presents with    Sinus Problem     New patient.  Chronic sinusitis.   She said she has had sinus surgery twice.  She said the right side gets chronically affected and she thinks it is affecting her lymph nodes.   She had had biopsies done of lymph nodes on both sides.   She experiencing headaches, gets a dry throat, and some sinus drainage.   She said she feels like there is something in the right cheek area.  She is seeing eye doctor tomorrow to see if she can get eye allergies in her right eye maintained. Referred by Sonja Diaz CNP   .    HPI:     Current visit documentation 06/12/2024:   Carlota Zhou presents today for nasal/sinus concerns.  Patient was referred by Sonja Diaz APRN *; notes reviewed.  Patient endorses history of nasosinus surgery approximately ten years ago with Dr. Santos ENT:  S/p bilateral ESS, MT -CB resection and septo and turbinoplasties on 03/16/2011 and endoscopic debridement under [facility] on 03/30/2011   She had established care with Dr. Nixon five years ago and no surgery was undergone at that time. Patient reports she is concerned for recurrent right maxillary sinusitis that is very impactful to her and concerns for untreated sinus disease resulting in chronic inflammatory response throughout body as she has recently undergone several lymph node biopsies: 2 in axilla and 3 to breast which have had reportedly benign results but evidence of inflammation. Patient reports that she gets recurrent pressure to right cheek, surrounding right eye, and frequent sore throat with post nasal drainage. She describes history of tonsil stones that she picks out. Patient describes history of

## 2024-06-12 ENCOUNTER — OFFICE VISIT (OUTPATIENT)
Dept: ENT CLINIC | Age: 49
End: 2024-06-12
Payer: COMMERCIAL

## 2024-06-12 VITALS
HEIGHT: 66 IN | OXYGEN SATURATION: 99 % | RESPIRATION RATE: 16 BRPM | HEART RATE: 86 BPM | BODY MASS INDEX: 31.13 KG/M2 | DIASTOLIC BLOOD PRESSURE: 70 MMHG | WEIGHT: 193.7 LBS | TEMPERATURE: 97.3 F | SYSTOLIC BLOOD PRESSURE: 122 MMHG

## 2024-06-12 DIAGNOSIS — B37.9 ANTIBIOTIC-INDUCED YEAST INFECTION: ICD-10-CM

## 2024-06-12 DIAGNOSIS — T36.95XA ANTIBIOTIC-INDUCED YEAST INFECTION: ICD-10-CM

## 2024-06-12 DIAGNOSIS — J35.8 TONSIL STONE: ICD-10-CM

## 2024-06-12 DIAGNOSIS — Z98.890 HX OF SINUS SURGERY: ICD-10-CM

## 2024-06-12 DIAGNOSIS — R06.83 LOUD SNORING: ICD-10-CM

## 2024-06-12 DIAGNOSIS — Z72.820 POOR SLEEP: ICD-10-CM

## 2024-06-12 DIAGNOSIS — R09.81 NASAL CONGESTION: ICD-10-CM

## 2024-06-12 DIAGNOSIS — J34.3 HYPERTROPHY OF BOTH INFERIOR NASAL TURBINATES: ICD-10-CM

## 2024-06-12 DIAGNOSIS — Z98.890 HX OF NASAL SEPTOPLASTY: ICD-10-CM

## 2024-06-12 DIAGNOSIS — J01.01 RECURRENT MAXILLARY SINUSITIS: Primary | ICD-10-CM

## 2024-06-12 PROCEDURE — 3078F DIAST BP <80 MM HG: CPT | Performed by: REGISTERED NURSE

## 2024-06-12 PROCEDURE — 3074F SYST BP LT 130 MM HG: CPT | Performed by: REGISTERED NURSE

## 2024-06-12 PROCEDURE — 99204 OFFICE O/P NEW MOD 45 MIN: CPT | Performed by: REGISTERED NURSE

## 2024-06-12 RX ORDER — TRIAMCINOLONE ACETONIDE 0.25 MG/G
CREAM TOPICAL EVERY 4 HOURS PRN
COMMUNITY

## 2024-06-12 RX ORDER — AZELASTINE HYDROCHLORIDE 0.5 MG/ML
SOLUTION/ DROPS OPHTHALMIC
COMMUNITY
Start: 2024-04-30

## 2024-06-12 RX ORDER — HYDROXYZINE HYDROCHLORIDE 25 MG/1
25 TABLET, FILM COATED ORAL 3 TIMES DAILY PRN
COMMUNITY

## 2024-06-12 RX ORDER — OMEGA-3S/DHA/EPA/FISH OIL/D3 300MG-1000
400 CAPSULE ORAL DAILY
COMMUNITY

## 2024-06-12 RX ORDER — FLUCONAZOLE 150 MG/1
150 TABLET ORAL ONCE
Qty: 1 TABLET | Refills: 0 | Status: SHIPPED | OUTPATIENT
Start: 2024-06-12 | End: 2024-06-12

## 2024-06-12 RX ORDER — AMOXICILLIN AND CLAVULANATE POTASSIUM 875; 125 MG/1; MG/1
1 TABLET, FILM COATED ORAL 2 TIMES DAILY
Qty: 42 TABLET | Refills: 0 | Status: SHIPPED | OUTPATIENT
Start: 2024-06-12 | End: 2024-07-03

## 2024-06-12 NOTE — PATIENT INSTRUCTIONS
Buffered normal saline nasal irrigation   The benefits   Saline (saltwater) washes the mucus and irritants from your nose.  The sinus passages are moisturized.  Studies have also shown that a nasal irrigation improves the function of cilia (tiny hair-like structures on cells that move the mucus).   The recipe   Use a one-quart glass jar that is thoroughly cleansed.  You may use a large medical syringe (30 cc), water pick with an irrigation tip, squeeze bottle, or Neti pot. Do not use a baby bulb syringe. The syringe or pick should be sterilized frequently or replaced every 2 to 3 weeks to avoid contamination and infection.  Fill with water that has been distilled, previously boiled, or otherwise sterilized. Plain tap water is not recommended, because it is not necessarily sterile.  Add 1 to 1½ heaping teaspoons of pickling/aubrey salt. Do not use table salt, because it contains a large number of additives.  You may also make up a solution from premixed packets that are commercially prepared specifically for nasal irrigation.   The instructions   Irrigate your nose with saline 1 to 2 times per day.  If you have been told to use nasal medication, you should always use your saline solution first. The nasal medication is much more effective when sprayed onto clean nasal membranes, and the spray will reach deeper into the nose.  Pour the amount of fluid you plan to use into a clean bowl. Do not put your used syringe back into the storage container, because it contaminates your solution.  You may warm the solution slightly in the microwave but be sure that the solution is not hot.  Bend over the sink (some people do this in the shower) and squirt the solution into each side of your nose, aiming the stream toward the back of your head, not the top of your head. The solution should flow into one nostril and out of the other, but it will not harm you if you swallow a little. Avoid blowing your nose for about 15 minutes

## 2024-07-15 ENCOUNTER — HOSPITAL ENCOUNTER (OUTPATIENT)
Dept: CT IMAGING | Age: 49
Discharge: HOME | End: 2024-07-15
Attending: REGISTERED NURSE
Payer: COMMERCIAL

## 2024-07-15 DIAGNOSIS — Z98.890 HX OF SINUS SURGERY: ICD-10-CM

## 2024-07-15 DIAGNOSIS — Z72.820 POOR SLEEP: ICD-10-CM

## 2024-07-15 DIAGNOSIS — J34.3 HYPERTROPHY OF BOTH INFERIOR NASAL TURBINATES: ICD-10-CM

## 2024-07-15 DIAGNOSIS — J01.01 RECURRENT MAXILLARY SINUSITIS: ICD-10-CM

## 2024-07-15 DIAGNOSIS — R09.81 NASAL CONGESTION: ICD-10-CM

## 2024-07-15 DIAGNOSIS — R06.83 LOUD SNORING: ICD-10-CM

## 2024-07-15 DIAGNOSIS — Z98.890 HX OF NASAL SEPTOPLASTY: ICD-10-CM

## 2024-07-15 PROCEDURE — 70486 CT MAXILLOFACIAL W/O DYE: CPT

## 2024-07-17 NOTE — PROGRESS NOTES
SD NASAL/SINUS NDSC W/TOTAL ETHOIDECTOMY     SD SUBMUCOUS RESCJ INFERIOR TURBINATE PRTL/COMPL      7. Chronic ethmoidal sinusitis  J32.2 SD NSL/SINUS NDSC MAX ANTROST W/RMVL TISS MAX SINUS     SD NASAL/SINUS ENDOSCOPY W/SPHENOIDOTOMY     IGS Endo Sinus Sx     SD NASAL/SINUS NDSC W/TOTAL ETHOIDECTOMY     SD SUBMUCOUS RESCJ INFERIOR TURBINATE PRTL/COMPL      8. Chronic maxillary sinusitis  J32.0 SD NSL/SINUS NDSC MAX ANTROST W/RMVL TISS MAX SINUS     SD NASAL/SINUS ENDOSCOPY W/SPHENOIDOTOMY     IGS Endo Sinus Sx     SD NASAL/SINUS NDSC W/TOTAL ETHOIDECTOMY     SD SUBMUCOUS RESCJ INFERIOR TURBINATE PRTL/COMPL      9. Chronic nonintractable headache, unspecified headache type  R51.9 SD NSL/SINUS NDSC MAX ANTROST W/RMVL TISS MAX SINUS    G89.29 SD NASAL/SINUS ENDOSCOPY W/SPHENOIDOTOMY     IGS Endo Sinus Sx     SD NASAL/SINUS NDSC W/TOTAL ETHOIDECTOMY     SD SUBMUCOUS RESCJ INFERIOR TURBINATE PRTL/COMPL      10. Mouth breathing  R06.5            CT imaging reviewed in detail with Dr. Nixon who recommends surgical plan:  Bilateral maxillary antrostomy with removal of tissue (removal of retained ostia)   Leela bullosa to left middle nasal turbinate  Completion ethmoidectomy bilaterally  Right inferior turbinate reduction; possibly left  Left sphenoidotomy  IGS utilization  Imaging reviewed in detail with patient and she requests to proceed with surgical plan as noted above. Patient brought to surgery scheduler to arrange a date and will plan to see Dr. Nixon for pre-op exam to confirm surgical plan. Patient voices history of tonsillar concerns including tonsil stones; she wishes to defer tonsillectomy until later time.     Endoscopic sinus surgery, septoplasty and turbinate surgery were recommended. Using the CT scan, the planned procedures were explained in detail. The risks and benefits of these sinus procedures, including but not limited to risk of anesthesia, bleeding, infection, vision loss and /or eye muscle

## 2024-07-18 ENCOUNTER — OFFICE VISIT (OUTPATIENT)
Dept: ENT CLINIC | Age: 49
End: 2024-07-18
Payer: COMMERCIAL

## 2024-07-18 VITALS
DIASTOLIC BLOOD PRESSURE: 92 MMHG | OXYGEN SATURATION: 98 % | SYSTOLIC BLOOD PRESSURE: 144 MMHG | WEIGHT: 192.8 LBS | HEIGHT: 66 IN | BODY MASS INDEX: 30.98 KG/M2 | HEART RATE: 69 BPM | TEMPERATURE: 97.7 F

## 2024-07-18 DIAGNOSIS — R51.9 CHRONIC NONINTRACTABLE HEADACHE, UNSPECIFIED HEADACHE TYPE: ICD-10-CM

## 2024-07-18 DIAGNOSIS — J32.3 CHRONIC SPHENOIDAL SINUSITIS: ICD-10-CM

## 2024-07-18 DIAGNOSIS — J32.0 CHRONIC MAXILLARY SINUSITIS: ICD-10-CM

## 2024-07-18 DIAGNOSIS — J34.89 NASAL OBSTRUCTION: Primary | ICD-10-CM

## 2024-07-18 DIAGNOSIS — R06.5 MOUTH BREATHING: ICD-10-CM

## 2024-07-18 DIAGNOSIS — R06.83 LOUD SNORING: ICD-10-CM

## 2024-07-18 DIAGNOSIS — J34.89 OSTIOMEATAL COMPLEX OBSTRUCTION OF PARANASAL SINUS: ICD-10-CM

## 2024-07-18 DIAGNOSIS — J34.3 HYPERTROPHY OF BOTH INFERIOR NASAL TURBINATES: ICD-10-CM

## 2024-07-18 DIAGNOSIS — J34.89 CONCHA BULLOSA: ICD-10-CM

## 2024-07-18 DIAGNOSIS — J32.2 CHRONIC ETHMOIDAL SINUSITIS: ICD-10-CM

## 2024-07-18 DIAGNOSIS — G89.29 CHRONIC NONINTRACTABLE HEADACHE, UNSPECIFIED HEADACHE TYPE: ICD-10-CM

## 2024-07-18 PROCEDURE — 3077F SYST BP >= 140 MM HG: CPT | Performed by: REGISTERED NURSE

## 2024-07-18 PROCEDURE — 99214 OFFICE O/P EST MOD 30 MIN: CPT | Performed by: REGISTERED NURSE

## 2024-07-18 PROCEDURE — 3080F DIAST BP >= 90 MM HG: CPT | Performed by: REGISTERED NURSE

## 2024-07-18 RX ORDER — GLIPIZIDE 5 MG/1
5 TABLET, FILM COATED, EXTENDED RELEASE ORAL DAILY
COMMUNITY
Start: 2024-06-21

## 2024-07-18 RX ORDER — LEVOCETIRIZINE DIHYDROCHLORIDE 5 MG/1
TABLET, FILM COATED ORAL
COMMUNITY

## 2024-07-18 RX ORDER — MONTELUKAST SODIUM 10 MG/1
1 TABLET ORAL
COMMUNITY

## 2024-10-30 DIAGNOSIS — Z01.818 PREOP TESTING: Primary | ICD-10-CM

## 2024-12-19 ENCOUNTER — OFFICE VISIT (OUTPATIENT)
Dept: ENT CLINIC | Age: 49
End: 2024-12-19
Payer: COMMERCIAL

## 2024-12-19 ENCOUNTER — HOSPITAL ENCOUNTER (OUTPATIENT)
Age: 49
Discharge: HOME | End: 2024-12-19
Payer: COMMERCIAL

## 2024-12-19 VITALS
WEIGHT: 193.2 LBS | BODY MASS INDEX: 31.05 KG/M2 | HEIGHT: 66 IN | DIASTOLIC BLOOD PRESSURE: 76 MMHG | OXYGEN SATURATION: 99 % | TEMPERATURE: 98.2 F | SYSTOLIC BLOOD PRESSURE: 134 MMHG | HEART RATE: 86 BPM

## 2024-12-19 DIAGNOSIS — J34.3 HYPERTROPHY OF BOTH INFERIOR NASAL TURBINATES: ICD-10-CM

## 2024-12-19 DIAGNOSIS — J34.89 CONCHA BULLOSA: ICD-10-CM

## 2024-12-19 DIAGNOSIS — J34.89 OSTIOMEATAL COMPLEX OBSTRUCTION OF PARANASAL SINUS: ICD-10-CM

## 2024-12-19 DIAGNOSIS — J34.89 NASAL OBSTRUCTION: Primary | ICD-10-CM

## 2024-12-19 DIAGNOSIS — Z01.818 PREOP TESTING: ICD-10-CM

## 2024-12-19 LAB
ALBUMIN SERPL BCG-MCNC: 4.7 G/DL (ref 3.5–5.1)
ALP SERPL-CCNC: 76 U/L (ref 38–126)
ALT SERPL W/O P-5'-P-CCNC: 19 U/L (ref 11–66)
ANION GAP SERPL CALC-SCNC: 13 MEQ/L (ref 8–16)
AST SERPL-CCNC: 18 U/L (ref 5–40)
BASOPHILS ABSOLUTE: 0 THOU/MM3 (ref 0–0.1)
BASOPHILS NFR BLD AUTO: 0.4 %
BILIRUB SERPL-MCNC: 0.4 MG/DL (ref 0.3–1.2)
BUN SERPL-MCNC: 7 MG/DL (ref 7–22)
CALCIUM SERPL-MCNC: 9.7 MG/DL (ref 8.5–10.5)
CHLORIDE SERPL-SCNC: 97 MEQ/L (ref 98–111)
CO2 SERPL-SCNC: 25 MEQ/L (ref 23–33)
CREAT SERPL-MCNC: 0.7 MG/DL (ref 0.4–1.2)
DEPRECATED RDW RBC AUTO: 39.4 FL (ref 35–45)
EOSINOPHIL NFR BLD AUTO: 2 %
EOSINOPHILS ABSOLUTE: 0.1 THOU/MM3 (ref 0–0.4)
ERYTHROCYTE [DISTWIDTH] IN BLOOD BY AUTOMATED COUNT: 13 % (ref 11.5–14.5)
GFR SERPL CREATININE-BSD FRML MDRD: > 90 ML/MIN/1.73M2
GLUCOSE SERPL-MCNC: 298 MG/DL (ref 70–108)
HCT VFR BLD AUTO: 41.2 % (ref 37–47)
HGB BLD-MCNC: 13.1 GM/DL (ref 12–16)
IMM GRANULOCYTES # BLD AUTO: 0.02 THOU/MM3 (ref 0–0.07)
IMM GRANULOCYTES NFR BLD AUTO: 0.4 %
LYMPHOCYTES ABSOLUTE: 2.2 THOU/MM3 (ref 1–4.8)
LYMPHOCYTES NFR BLD AUTO: 40.8 %
MCH RBC QN AUTO: 26.6 PG (ref 26–33)
MCHC RBC AUTO-ENTMCNC: 31.8 GM/DL (ref 32.2–35.5)
MCV RBC AUTO: 83.6 FL (ref 81–99)
MONOCYTES ABSOLUTE: 0.4 THOU/MM3 (ref 0.4–1.3)
MONOCYTES NFR BLD AUTO: 6.8 %
NEUTROPHILS ABSOLUTE: 2.7 THOU/MM3 (ref 1.8–7.7)
NEUTROPHILS NFR BLD AUTO: 49.6 %
NRBC BLD AUTO-RTO: 0 /100 WBC
PLATELET # BLD AUTO: 219 THOU/MM3 (ref 130–400)
PMV BLD AUTO: 10.9 FL (ref 9.4–12.4)
POTASSIUM SERPL-SCNC: 3.5 MEQ/L (ref 3.5–5.2)
PROT SERPL-MCNC: 7.8 G/DL (ref 6.1–8)
RBC # BLD AUTO: 4.93 MILL/MM3 (ref 4.2–5.4)
SODIUM SERPL-SCNC: 135 MEQ/L (ref 135–145)
WBC # BLD AUTO: 5.5 THOU/MM3 (ref 4.8–10.8)

## 2024-12-19 PROCEDURE — 80053 COMPREHEN METABOLIC PANEL: CPT

## 2024-12-19 PROCEDURE — 85025 COMPLETE CBC W/AUTO DIFF WBC: CPT

## 2024-12-19 PROCEDURE — 36415 COLL VENOUS BLD VENIPUNCTURE: CPT

## 2024-12-19 PROCEDURE — 93005 ELECTROCARDIOGRAM TRACING: CPT

## 2024-12-19 PROCEDURE — 99214 OFFICE O/P EST MOD 30 MIN: CPT | Performed by: OTOLARYNGOLOGY

## 2024-12-19 PROCEDURE — 3078F DIAST BP <80 MM HG: CPT | Performed by: OTOLARYNGOLOGY

## 2024-12-19 PROCEDURE — 3075F SYST BP GE 130 - 139MM HG: CPT | Performed by: OTOLARYNGOLOGY

## 2024-12-19 NOTE — PROGRESS NOTES
[facility] [place] [facility]  [facility] EAR, NOSE AND THROAT  [address]  [address]  [facility] [address]  Dept: [phone]  Dept Fax: [phone]  Loc: [phone]    Carlota Zhou is a 49 y.o. female who was referred by No ref. provider found for:  Chief Complaint   Patient presents with    Pre-op Exam     Patient here for a pre op smr, sowmya, ethmoid, max, sphenoid.    .    HPI:     Carlota Zhou is a 49 y.o. female who presents today for preop evaluation for the procedures as well as informed consent regarding her upcoming sinus surgery.  She has had extensive sinus surgery in the past and has an empty nose syndrome.  She has areas of trapped infection as noted below..  CT was performed after several weeks of vigorous therapy for her sinusitis.    My review of the facial bone CT shows bilateral chronic chronic maxillary sinusitis because of missed ostium bilaterally.  Right inferior turbinate is hypertrophic left middle turbinate has a sowmya bullosa that has not been adequately opened and drained and has air-fluid level within it.  There is inspissated secretions in the left sphenoid consistent with old infection.  There are several residual ethmoid cells that are not involved with any disease.  Septum is midline.  Left middle turbinate does impact the septum and could cause rhinogenic headache.                        History:     Allergies   Allergen Reactions    Cinnamon Other (See Comments)     Migraine. Lip Swelling. Throat Itching.    Other Reaction(s): Unknown    Peach [Prunus Persica] Itching and Swelling    Codeine Hives and Itching     Other Reaction(s): Unknown, Unknown by Patient    Peach Flavoring Agent (Non-Screening) Itching and Swelling    Acetaminophen      Other Reaction(s): Unknown by Patient    Griseofulvin Ultramicrosize [Griseofulvin] Itching    Lamotrigine Itching and Other (See Comments)     Other Reaction(s): Lamictal, sleepy, sleepy, sleepy, Unknown by

## 2024-12-20 LAB
EKG ATRIAL RATE: 68 BPM
EKG P AXIS: 34 DEGREES
EKG P-R INTERVAL: 136 MS
EKG Q-T INTERVAL: 380 MS
EKG QRS DURATION: 84 MS
EKG QTC CALCULATION (BAZETT): 404 MS
EKG R AXIS: 23 DEGREES
EKG T AXIS: 39 DEGREES
EKG VENTRICULAR RATE: 68 BPM

## 2024-12-20 RX ORDER — TRIAMCINOLONE ACETONIDE 5 MG/G
CREAM TOPICAL PRN
COMMUNITY
Start: 2024-09-26

## 2024-12-20 RX ORDER — GLUCOSAM/CHON-MSM1/C/MANG/BOSW 500-416.6
TABLET ORAL
COMMUNITY
Start: 2024-09-25

## 2024-12-20 RX ORDER — CALCIUM CITRATE/VITAMIN D3 200MG-6.25
TABLET ORAL
COMMUNITY
Start: 2024-09-25

## 2024-12-20 NOTE — PROGRESS NOTES
Follow all instructions given by your physician  Do not eat or drink anything after midnight prior to surgery(includes water, chewing gum, mints and ice chips)  Sips of water am of surgery with allowed medications  May brush teeth   Do not smoke or chew tobacco, drink alcoholic beverages or use any illicit drugs for 24 hours prior to surgery  Bring insurance info and photo ID  Bring pertinent paperwork with you from Doctor or surgeons's office  Wear clean comfortable, loose-fitting clothing  No make-up, nail polish, jewelry, piercings, or contact lenses to be worn day of surgery  No glue on dentures morning of surgery; you will be asked to remove them for surgery. Case for glasses.  Shower the night before and the morning of surgery with cleansing soap provided or a liquid antibacterial soap, dry with new fresh clean towel after each shower, no lotions, creams or powder.  Clean sheets and pillowcase on bed night before surgery  Bring medications in original bottles, Bring rescue inhalers with you  Bring CPAP/BIPAP machine if you have one ( you may be charged if one is needed in recovery room)    Do you have a DNR?   Please Bring Healthcare Directive or Healthcare Power of  in so we can scan it into your chart.    Our pharmacy has a Meds to Beds program where they will deliver any new prescriptions you may have to your room before you leave. Our Pharmacy will clear it through your insurance; for example (same co pay). This enables you to take your new RX as soon as you need when you get home and avoids stop/wait delays on the way home.  Please have a form of payment with you and have someone designated as your Pharmacy contact with their phone # as you may not feel well or still be under the influence of anesthesia.    Please refer to the SSI-Surgical Site Infection Flyer you hopefully received in the mail-together we can prevent infections; signs and symptoms reviewed.  When discharged be sure you understand

## 2024-12-30 ENCOUNTER — PREP FOR PROCEDURE (OUTPATIENT)
Dept: ENT CLINIC | Age: 49
End: 2024-12-30

## 2024-12-30 DIAGNOSIS — J34.3 HYPERTROPHY OF BOTH INFERIOR NASAL TURBINATES: ICD-10-CM

## 2024-12-30 DIAGNOSIS — J32.0 CHRONIC MAXILLARY SINUSITIS: ICD-10-CM

## 2024-12-30 DIAGNOSIS — J34.89 CONCHA BULLOSA: ICD-10-CM

## 2024-12-30 DIAGNOSIS — J34.89 OSTIOMEATAL COMPLEX OBSTRUCTION OF NASAL SINUS: ICD-10-CM

## 2024-12-30 DIAGNOSIS — J32.2 CHRONIC ETHMOIDAL SINUSITIS: Primary | ICD-10-CM

## 2024-12-30 DIAGNOSIS — J34.89 NASAL OBSTRUCTION: ICD-10-CM

## 2025-01-03 ENCOUNTER — TELEPHONE (OUTPATIENT)
Dept: ENT CLINIC | Age: 50
End: 2025-01-03

## 2025-01-03 NOTE — TELEPHONE ENCOUNTER
I called [facility] because I knew patient had pre op today at 1130. I asked if they could at least provide verbal clearance if form wasn't done. Patient was still in the office so Fariha from there office was going to check. Fariha called me right back and stated that she spoke with nurse, patient is cleared, they are faxing over form.

## 2025-01-04 PROBLEM — J34.89 NASAL OBSTRUCTION: Status: ACTIVE | Noted: 2025-01-04

## 2025-01-04 PROBLEM — J34.3 HYPERTROPHY OF BOTH INFERIOR NASAL TURBINATES: Status: ACTIVE | Noted: 2025-01-04

## 2025-01-04 PROBLEM — J34.89 CONCHA BULLOSA: Status: ACTIVE | Noted: 2025-01-04

## 2025-01-05 PROBLEM — J32.2 CHRONIC ETHMOIDAL SINUSITIS: Status: ACTIVE | Noted: 2025-01-05

## 2025-01-05 PROBLEM — J32.0 CHRONIC MAXILLARY SINUSITIS: Status: ACTIVE | Noted: 2025-01-05

## 2025-01-05 PROBLEM — J32.3 CHRONIC SPHENOIDAL SINUSITIS: Status: ACTIVE | Noted: 2025-01-05

## 2025-01-05 PROBLEM — J34.89 OSTIOMEATAL COMPLEX OBSTRUCTION OF NASAL SINUS: Status: ACTIVE | Noted: 2025-01-05

## 2025-01-05 RX ORDER — SODIUM CHLORIDE 9 MG/ML
INJECTION, SOLUTION INTRAVENOUS PRN
Status: CANCELLED | OUTPATIENT
Start: 2025-01-05

## 2025-01-05 RX ORDER — SODIUM CHLORIDE 0.9 % (FLUSH) 0.9 %
5-40 SYRINGE (ML) INJECTION EVERY 12 HOURS SCHEDULED
Status: CANCELLED | OUTPATIENT
Start: 2025-01-05

## 2025-01-05 RX ORDER — SODIUM CHLORIDE 0.9 % (FLUSH) 0.9 %
5-40 SYRINGE (ML) INJECTION PRN
Status: CANCELLED | OUTPATIENT
Start: 2025-01-05

## 2025-01-06 ENCOUNTER — ANESTHESIA EVENT (OUTPATIENT)
Dept: OPERATING ROOM | Age: 50
End: 2025-01-06
Payer: COMMERCIAL

## 2025-01-06 ENCOUNTER — ANESTHESIA (OUTPATIENT)
Dept: OPERATING ROOM | Age: 50
End: 2025-01-06
Payer: COMMERCIAL

## 2025-01-06 ENCOUNTER — HOSPITAL ENCOUNTER (OUTPATIENT)
Age: 50
Setting detail: OUTPATIENT SURGERY
Discharge: HOME | End: 2025-01-06
Attending: OTOLARYNGOLOGY | Admitting: OTOLARYNGOLOGY
Payer: COMMERCIAL

## 2025-01-06 VITALS
HEIGHT: 66 IN | BODY MASS INDEX: 30.57 KG/M2 | TEMPERATURE: 97.6 F | SYSTOLIC BLOOD PRESSURE: 150 MMHG | DIASTOLIC BLOOD PRESSURE: 55 MMHG | WEIGHT: 190.2 LBS | HEART RATE: 75 BPM | RESPIRATION RATE: 16 BRPM | OXYGEN SATURATION: 97 %

## 2025-01-06 DIAGNOSIS — J34.89 CONCHA BULLOSA: ICD-10-CM

## 2025-01-06 DIAGNOSIS — J34.89 OSTIOMEATAL COMPLEX OBSTRUCTION OF PARANASAL SINUS: ICD-10-CM

## 2025-01-06 DIAGNOSIS — J32.3 CHRONIC SPHENOIDAL SINUSITIS: Primary | ICD-10-CM

## 2025-01-06 DIAGNOSIS — R51.9 CHRONIC NONINTRACTABLE HEADACHE, UNSPECIFIED HEADACHE TYPE: ICD-10-CM

## 2025-01-06 DIAGNOSIS — G89.29 CHRONIC NONINTRACTABLE HEADACHE, UNSPECIFIED HEADACHE TYPE: ICD-10-CM

## 2025-01-06 DIAGNOSIS — J34.89 NASAL OBSTRUCTION: ICD-10-CM

## 2025-01-06 DIAGNOSIS — J32.0 CHRONIC MAXILLARY SINUSITIS: ICD-10-CM

## 2025-01-06 DIAGNOSIS — R06.83 LOUD SNORING: ICD-10-CM

## 2025-01-06 DIAGNOSIS — J34.3 HYPERTROPHY OF BOTH INFERIOR NASAL TURBINATES: ICD-10-CM

## 2025-01-06 DIAGNOSIS — J32.2 CHRONIC ETHMOIDAL SINUSITIS: ICD-10-CM

## 2025-01-06 LAB
GLUCOSE BLD STRIP.AUTO-MCNC: 282 MG/DL (ref 70–108)
POTASSIUM SERPL-SCNC: 3.7 MEQ/L (ref 3.5–5.2)

## 2025-01-06 PROCEDURE — 6370000000 HC RX 637 (ALT 250 FOR IP): Performed by: OTOLARYNGOLOGY

## 2025-01-06 PROCEDURE — 7100000001 HC PACU RECOVERY - ADDTL 15 MIN: Performed by: OTOLARYNGOLOGY

## 2025-01-06 PROCEDURE — 2580000003 HC RX 258: Performed by: NURSE ANESTHETIST, CERTIFIED REGISTERED

## 2025-01-06 PROCEDURE — 87070 CULTURE OTHR SPECIMN AEROBIC: CPT

## 2025-01-06 PROCEDURE — 87147 CULTURE TYPE IMMUNOLOGIC: CPT

## 2025-01-06 PROCEDURE — 2720000010 HC SURG SUPPLY STERILE: Performed by: OTOLARYNGOLOGY

## 2025-01-06 PROCEDURE — 7100000011 HC PHASE II RECOVERY - ADDTL 15 MIN: Performed by: OTOLARYNGOLOGY

## 2025-01-06 PROCEDURE — 3700000001 HC ADD 15 MINUTES (ANESTHESIA): Performed by: OTOLARYNGOLOGY

## 2025-01-06 PROCEDURE — 3600000014 HC SURGERY LEVEL 4 ADDTL 15MIN: Performed by: OTOLARYNGOLOGY

## 2025-01-06 PROCEDURE — 87186 SC STD MICRODIL/AGAR DIL: CPT

## 2025-01-06 PROCEDURE — 7100000010 HC PHASE II RECOVERY - FIRST 15 MIN: Performed by: OTOLARYNGOLOGY

## 2025-01-06 PROCEDURE — 6360000002 HC RX W HCPCS: Performed by: OTOLARYNGOLOGY

## 2025-01-06 PROCEDURE — 3700000000 HC ANESTHESIA ATTENDED CARE: Performed by: OTOLARYNGOLOGY

## 2025-01-06 PROCEDURE — 84132 ASSAY OF SERUM POTASSIUM: CPT

## 2025-01-06 PROCEDURE — 2709999900 HC NON-CHARGEABLE SUPPLY: Performed by: OTOLARYNGOLOGY

## 2025-01-06 PROCEDURE — 7100000000 HC PACU RECOVERY - FIRST 15 MIN: Performed by: OTOLARYNGOLOGY

## 2025-01-06 PROCEDURE — 2500000003 HC RX 250 WO HCPCS: Performed by: NURSE ANESTHETIST, CERTIFIED REGISTERED

## 2025-01-06 PROCEDURE — 6360000002 HC RX W HCPCS: Performed by: NURSE ANESTHETIST, CERTIFIED REGISTERED

## 2025-01-06 PROCEDURE — 82948 REAGENT STRIP/BLOOD GLUCOSE: CPT

## 2025-01-06 PROCEDURE — 2580000003 HC RX 258: Performed by: OTOLARYNGOLOGY

## 2025-01-06 PROCEDURE — 2500000003 HC RX 250 WO HCPCS: Performed by: OTOLARYNGOLOGY

## 2025-01-06 PROCEDURE — 36415 COLL VENOUS BLD VENIPUNCTURE: CPT

## 2025-01-06 PROCEDURE — 87077 CULTURE AEROBIC IDENTIFY: CPT

## 2025-01-06 PROCEDURE — 87205 SMEAR GRAM STAIN: CPT

## 2025-01-06 PROCEDURE — 3600000004 HC SURGERY LEVEL 4 BASE: Performed by: OTOLARYNGOLOGY

## 2025-01-06 RX ORDER — FENTANYL CITRATE 50 UG/ML
INJECTION, SOLUTION INTRAMUSCULAR; INTRAVENOUS
Status: DISCONTINUED | OUTPATIENT
Start: 2025-01-06 | End: 2025-01-06 | Stop reason: SDUPTHER

## 2025-01-06 RX ORDER — LIDOCAINE HYDROCHLORIDE AND EPINEPHRINE 10; 10 MG/ML; UG/ML
INJECTION, SOLUTION INFILTRATION; PERINEURAL PRN
Status: DISCONTINUED | OUTPATIENT
Start: 2025-01-06 | End: 2025-01-06 | Stop reason: ALTCHOICE

## 2025-01-06 RX ORDER — LIDOCAINE HCL/PF 100 MG/5ML
SYRINGE (ML) INJECTION
Status: DISCONTINUED | OUTPATIENT
Start: 2025-01-06 | End: 2025-01-06 | Stop reason: SDUPTHER

## 2025-01-06 RX ORDER — SODIUM CHLORIDE 0.9 % (FLUSH) 0.9 %
5-40 SYRINGE (ML) INJECTION PRN
Status: DISCONTINUED | OUTPATIENT
Start: 2025-01-06 | End: 2025-01-06 | Stop reason: HOSPADM

## 2025-01-06 RX ORDER — DEXAMETHASONE SODIUM PHOSPHATE 4 MG/ML
INJECTION, SOLUTION INTRA-ARTICULAR; INTRALESIONAL; INTRAMUSCULAR; INTRAVENOUS; SOFT TISSUE PRN
Status: DISCONTINUED | OUTPATIENT
Start: 2025-01-06 | End: 2025-01-06 | Stop reason: ALTCHOICE

## 2025-01-06 RX ORDER — PROPOFOL 10 MG/ML
INJECTION, EMULSION INTRAVENOUS
Status: DISCONTINUED | OUTPATIENT
Start: 2025-01-06 | End: 2025-01-06 | Stop reason: SDUPTHER

## 2025-01-06 RX ORDER — CEFDINIR 300 MG/1
300 CAPSULE ORAL 2 TIMES DAILY
Qty: 20 CAPSULE | Refills: 0 | Status: SHIPPED | OUTPATIENT
Start: 2025-01-06 | End: 2025-01-16

## 2025-01-06 RX ORDER — OXYCODONE HYDROCHLORIDE 5 MG/1
5 TABLET ORAL EVERY 6 HOURS PRN
Qty: 20 TABLET | Refills: 0 | Status: SHIPPED | OUTPATIENT
Start: 2025-01-06 | End: 2025-01-07

## 2025-01-06 RX ORDER — DEXAMETHASONE SODIUM PHOSPHATE 4 MG/ML
10 INJECTION, SOLUTION INTRA-ARTICULAR; INTRALESIONAL; INTRAMUSCULAR; INTRAVENOUS; SOFT TISSUE
Status: COMPLETED | OUTPATIENT
Start: 2025-01-06 | End: 2025-01-06

## 2025-01-06 RX ORDER — ONDANSETRON 2 MG/ML
INJECTION INTRAMUSCULAR; INTRAVENOUS
Status: DISCONTINUED | OUTPATIENT
Start: 2025-01-06 | End: 2025-01-06 | Stop reason: SDUPTHER

## 2025-01-06 RX ORDER — OXYMETAZOLINE HYDROCHLORIDE 0.05 G/100ML
SPRAY NASAL PRN
Status: DISCONTINUED | OUTPATIENT
Start: 2025-01-06 | End: 2025-01-06 | Stop reason: ALTCHOICE

## 2025-01-06 RX ORDER — GINSENG 100 MG
CAPSULE ORAL PRN
Status: DISCONTINUED | OUTPATIENT
Start: 2025-01-06 | End: 2025-01-06 | Stop reason: ALTCHOICE

## 2025-01-06 RX ORDER — FAMOTIDINE 20 MG/1
20 TABLET, FILM COATED ORAL 2 TIMES DAILY
Qty: 60 TABLET | Refills: 0 | Status: SHIPPED | OUTPATIENT
Start: 2025-01-06 | End: 2025-02-05

## 2025-01-06 RX ORDER — OXYCODONE HYDROCHLORIDE 5 MG/1
5 TABLET ORAL ONCE
Status: COMPLETED | OUTPATIENT
Start: 2025-01-06 | End: 2025-01-06

## 2025-01-06 RX ORDER — PREDNISONE 20 MG/1
TABLET ORAL
Qty: 4 TABLET | Refills: 0 | Status: SHIPPED | OUTPATIENT
Start: 2025-01-06

## 2025-01-06 RX ORDER — ROCURONIUM BROMIDE 10 MG/ML
INJECTION, SOLUTION INTRAVENOUS
Status: DISCONTINUED | OUTPATIENT
Start: 2025-01-06 | End: 2025-01-06 | Stop reason: SDUPTHER

## 2025-01-06 RX ORDER — SODIUM CHLORIDE 9 MG/ML
INJECTION, SOLUTION INTRAVENOUS PRN
Status: DISCONTINUED | OUTPATIENT
Start: 2025-01-06 | End: 2025-01-06 | Stop reason: HOSPADM

## 2025-01-06 RX ORDER — MIDAZOLAM HYDROCHLORIDE 1 MG/ML
INJECTION, SOLUTION INTRAMUSCULAR; INTRAVENOUS
Status: DISCONTINUED | OUTPATIENT
Start: 2025-01-06 | End: 2025-01-06 | Stop reason: SDUPTHER

## 2025-01-06 RX ORDER — SODIUM CHLORIDE 9 MG/ML
INJECTION, SOLUTION INTRAVENOUS
Status: DISCONTINUED | OUTPATIENT
Start: 2025-01-06 | End: 2025-01-06 | Stop reason: SDUPTHER

## 2025-01-06 RX ORDER — SODIUM CHLORIDE 0.9 % (FLUSH) 0.9 %
5-40 SYRINGE (ML) INJECTION EVERY 12 HOURS SCHEDULED
Status: DISCONTINUED | OUTPATIENT
Start: 2025-01-06 | End: 2025-01-06 | Stop reason: HOSPADM

## 2025-01-06 RX ADMIN — Medication 100 MG: at 07:44

## 2025-01-06 RX ADMIN — FENTANYL CITRATE 50 MCG: 50 INJECTION, SOLUTION INTRAMUSCULAR; INTRAVENOUS at 08:01

## 2025-01-06 RX ADMIN — WATER 1000 MG: 1 INJECTION INTRAMUSCULAR; INTRAVENOUS; SUBCUTANEOUS at 07:03

## 2025-01-06 RX ADMIN — SODIUM CHLORIDE: 9 INJECTION, SOLUTION INTRAVENOUS at 06:32

## 2025-01-06 RX ADMIN — PROPOFOL 150 MG: 10 INJECTION, EMULSION INTRAVENOUS at 07:44

## 2025-01-06 RX ADMIN — DEXAMETHASONE SODIUM PHOSPHATE 10 MG: 4 INJECTION, SOLUTION INTRAMUSCULAR; INTRAVENOUS at 06:59

## 2025-01-06 RX ADMIN — FAMOTIDINE 20 MG: 10 INJECTION, SOLUTION INTRAVENOUS at 06:32

## 2025-01-06 RX ADMIN — ROCURONIUM BROMIDE 50 MG: 10 INJECTION INTRAVENOUS at 07:44

## 2025-01-06 RX ADMIN — ONDANSETRON 4 MG: 2 INJECTION INTRAMUSCULAR; INTRAVENOUS at 07:51

## 2025-01-06 RX ADMIN — PROPOFOL 50 MG: 10 INJECTION, EMULSION INTRAVENOUS at 10:19

## 2025-01-06 RX ADMIN — SUGAMMADEX 200 MG: 100 INJECTION, SOLUTION INTRAVENOUS at 10:33

## 2025-01-06 RX ADMIN — MIDAZOLAM 2 MG: 1 INJECTION INTRAMUSCULAR; INTRAVENOUS at 07:37

## 2025-01-06 RX ADMIN — OXYCODONE 5 MG: 5 TABLET ORAL at 12:19

## 2025-01-06 RX ADMIN — FENTANYL CITRATE 50 MCG: 50 INJECTION, SOLUTION INTRAMUSCULAR; INTRAVENOUS at 07:44

## 2025-01-06 RX ADMIN — SODIUM CHLORIDE: 9 INJECTION, SOLUTION INTRAVENOUS at 07:37

## 2025-01-06 ASSESSMENT — PAIN - FUNCTIONAL ASSESSMENT
PAIN_FUNCTIONAL_ASSESSMENT: 0-10
PAIN_FUNCTIONAL_ASSESSMENT: NONE - DENIES PAIN

## 2025-01-06 ASSESSMENT — PAIN DESCRIPTION - LOCATION
LOCATION: NOSE
LOCATION: NOSE

## 2025-01-06 ASSESSMENT — PAIN SCALES - GENERAL
PAINLEVEL_OUTOF10: 8
PAINLEVEL_OUTOF10: 5
PAINLEVEL_OUTOF10: 4

## 2025-01-06 ASSESSMENT — PAIN DESCRIPTION - DESCRIPTORS: DESCRIPTORS: CRAMPING;ACHING

## 2025-01-06 ASSESSMENT — PAIN DESCRIPTION - PAIN TYPE: TYPE: SURGICAL PAIN

## 2025-01-06 NOTE — ADDENDUM NOTE
Addendum  created 01/06/25 1157 by Farhana Adair APRN - CRNA    Flowsheet accepted, Intraprocedure Flowsheets edited

## 2025-01-06 NOTE — PROGRESS NOTES
Pt admitted to [facility] room 6 and oriented to unit. SCD sleeves applied. Pt verbalized permission for first name, last initial and physicians name on white board. SDS board and discharge criteria explained, pt and family verbalized understanding. Pt denies thoughts of harming self or others. Call light in reach. Family at the bedside.  Anatoly [phone]

## 2025-01-06 NOTE — PROGRESS NOTES
1049 Arouses to name on arrival to PACU , HOB elevated and simple mask replaced with humidified O2   1056 Awake and oriented denies any pain or nausea and drifts back to sleep   1105 resting resp easy   1115 awakens to name continues to deny pain or nausea , drifts back to sleep easily   1120 Meets criteria for discharge , [facility] to [facility]

## 2025-01-06 NOTE — DISCHARGE INSTRUCTIONS
NASAL SURGERY   POST-OP INSTRUCTION SHEET    1. Keep your scheduled post-operative appointment.  2. Do not blow your nose for 3 days after surgery. You may gently sniff   to clear drainage. No snorting.  3. Expect moderate amounts of bloody discharge for a few hours. Change your dressing   as needed if it becomes soiled. Place it on your upper lip and do not block the nostrils. If you are not having discharge you do not have to wear a dressing.   4. Activity should be gradually increased, but you should not lift over   10-15 lbs., or exercise more strenuously than fast walking for 3 days  following surgery. Straining to stabilize your core is the issue, not how much your arm is holding. You may return to work, with these guidelines in mind, as soon as you feel well enough, and are off narcotic pain medicine, unless work is in a codey/dirty environment.      Please follow additional post-op instructions provided by nursing staff upon leaving hospital.  If you have any questions or problems, please contact our office ar [phone]       What symptoms are normal?    Soreness in front of nose and/or upper teeth  Clear/bloody drainage during first 3 days is not unusual  Facial pains/headaches  Sutures inside of nose will either dissolve away or fall off-- May take up to 6 wks after surgery  Nasal stuffiness improves gradually over weeks.    Medicines:    You will get 4 prescriptions sent directly to your pharmacy     Use Afrin nasal spray, 8 drops each nostril on back with head hanging off edge of bed, stay five minutes, at least every 8 hours and at bedtime for five days.. Today at 4 PM, 10 PM, 6 AM, 12 NOON      Brand-name original AFRIN is more comfortable.  You may use that instead of the generic oxymetazoline from the hospital.    Apply Bacitracin ointment with a Q tip, inside about a half inch all around.   Three times a day for 2 weeks..then every night as needed for 4 weeks    Saline Sinus Rinse:   Twice a Day

## 2025-01-06 NOTE — ANESTHESIA PRE PROCEDURE
Department of Anesthesiology  Preprocedure Note       Name:  Carlota Zhou   Age:  49 y.o.  :  1975                                          MRN:  [id number]         Date:  2025      Surgeon: Surgeon(s):  Rodrigo Nixon MD    Procedure: Procedure(s):  Partial SUbmucosal Resection of Right and Possible Left Inferior Nasal Turbinates, Partial Resection of Leela Bullosa of Left Middle Nasal Turbinate, Image Guided Nasal Endoscopy with Total Bilateral Ethmoidectomy (completion) bialteral  maxillary antrostomy with removal of tissue from maxillary sinus ( missed maxillary ostium) Left Sphenoidotomy    Medications prior to admission:   Prior to Admission medications    Medication Sig Start Date End Date Taking? Authorizing Provider   Blood Glucose Monitoring Suppl (TRUE METRIX METER) w/Device KIT check BLOOD SUGAR 2-3 times DAILY AS NEEDED 24  Yes China Romeo MD   TRUEplus Lancets 28G MISC check BLOOD SUGAR ONCE DAILY 24  Yes ProviderChina MD   TRUE METRIX BLOOD GLUCOSE TEST strip check fast BLOOD SUGAR ONCE DAILY 24  Yes Provider, MD China   triamcinolone (ARISTOCORT) 0.5 % cream as needed 24  Yes Provider, MD China   Ascorbic Acid (VITAMIN C PO) Take by mouth daily   Yes Provider, MD China   VITAMIN E PO Take by mouth daily   Yes Provider, MD China   glipiZIDE (GLUCOTROL XL) 5 MG extended release tablet Take 1 tablet by mouth daily 24  Yes Provider, MD [place]   vitamin D3 (CHOLECALCIFEROL) 10 MCG (400 UNIT) TABS tablet Take 1 tablet by mouth daily   Yes Provider, MD China   omeprazole (PRILOSEC) 40 MG delayed release capsule Take 1 capsule by mouth daily   Yes Provider, MD [place]   gabapentin (NEURONTIN) 300 MG capsule Take 1 capsule by mouth nightly for 30 days. 21 Yes Amanda Szymanski APRN - CNP   amLODIPine (NORVASC) 10 MG tablet Take 1 tablet by mouth daily   Yes ProviderChina MD   metFORMIN

## 2025-01-06 NOTE — BRIEF OP NOTE
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]     * Ostiomeatal complex obstruction of paranasal sinus [J34.89]     * Loud snoring [R06.83]     * Hypertrophy of both inferior nasal turbinates [J34.3]     * Chronic ethmoidal sinusitis [J32.2]     * Chronic maxillary sinusitis [J32.0]     * Chronic sphenoidal sinusitis [J32.3]     * Chronic nonintractable headache, unspecified headache type [R51.9, G89.29]    Post-Op Diagnosis: Same and nasaal septal perforation, adhesions of middle turbinates and left superior turbinate to the sseptum       Procedure(s):  Partial Submucosal Resection of Right Inferior Nasal Turbinate, Partial Resection of Leela Bullosa of Left Middle Nasal Turbinate, Image Guided Nasal Endoscopy with bilateral maxillary antrostomy without removal of tissue from maxillary sinus (missed maxillary ostium) Left Sphenoidotomy (missed natural sphenoid ostium), bilateral lysis of adhesions septum to turbinate, anterior ethmoidectomy right side, stereotactic computerized image guidance for sinus surgery.    Surgeon(s):  Rodrigo Nixon MD    Assistant:  * No surgical staff found *    Anesthesia: General    Estimated Blood Loss (mL): less than 100     Complications: None    Specimens:   ID Type Source Tests Collected by Time Destination   1 : Nasal Fossa Swab Nose CULTURE, AEROBIC Rodrigo Nixon MD 1/6/2025 0858        Implants:  * No implants in log *      Drains: * No LDAs found *    Findings: The natural ostia for the maxillary sinuses and the left sphenoid were not included in the prior window was placed.  This caused reentry of secretions and in fact I took a photograph of thick secretions coming out of the left natural ostium and returning into the left maxillary nasal antral window.  Significant resection of the middle turbinates  had been performed but there was a persistent sowmya bullosae left middle turbinate with an air-fluid level on the CT scan.  Both middle turbinates were adherent to the septum medially and there were 2 pre-existing septal perforations noted.  Right inferior turbinate was quite large and obstructing and a partial submucosal resection was performed with a 2 mm microdebrider blade.  An anterior partial ethmoidectomy was performed on the right side to open up opacified cells.  Ethmoidectomies were deferred since they were mainly clear and once I had corrected the other problems, drainage pathways seemed intact.    Electronically signed by SHARI CRANDALL MD on 1/6/2025 at 10:59 AM

## 2025-01-06 NOTE — PROGRESS NOTES
Pt returned to SDS room 6. Vitals and assessment as charted. 0.9 infusing,  to count from PACU. Pt has crackers and water. Family at the bedside. Pt and family verbalized understanding of discharge criteria and call light use. Call light in reach.

## 2025-01-06 NOTE — H&P
heat intolerance, polydipsia and polyuria.   Genitourinary:  Negative for dysuria, enuresis and hematuria.   Musculoskeletal:  Negative for arthralgias, gait problem, neck pain and neck stiffness.   Skin:  Negative for color change and rash.   Allergic/Immunologic: Negative for environmental allergies, food allergies and immunocompromised state.   Neurological:  Negative for dizziness, syncope, facial asymmetry, speech difficulty, light-headedness and headaches.   Hematological:  Negative for adenopathy. Does not bruise/bleed easily.   Psychiatric/Behavioral:  Negative for confusion and sleep disturbance. The patient is not nervous/anxious.        Objective:   /76 (Site: Right Lower Arm, Position: Sitting)   Pulse 86   Temp 98.2 °F (36.8 °C) (Oral)   Ht 1.676 m (5' 5.98\")   Wt 87.6 kg (193 lb 3.2 oz)   SpO2 99%   BMI 31.20 kg/m²     Physical Exam  Vitals and nursing note reviewed.   Constitutional:       Appearance: She is well-developed.   HENT:      Head: Normocephalic and atraumatic. No laceration.      Salivary Glands: Right salivary gland is not diffusely enlarged or tender. Left salivary gland is not diffusely enlarged or tender.      Comments:        Right Ear: Hearing, tympanic membrane, ear canal and external ear normal. No drainage or swelling. No middle ear effusion. Tympanic membrane is not perforated or erythematous.      Left Ear: Hearing, tympanic membrane, ear canal and external ear normal. No drainage or swelling.  No middle ear effusion. Tympanic membrane is not perforated or erythematous.      Nose: Nose normal. No septal deviation, mucosal edema or rhinorrhea.      Right Turbinates: Enlarged.      Comments: Tuckerman patent nasal fossa     Mouth/Throat:      Mouth: Mucous membranes are moist. Mucous membranes are not pale and not dry. No oral lesions.      Tongue: No lesions.      Pharynx: Oropharynx is clear. Uvula midline. No oropharyngeal exudate or posterior oropharyngeal erythema.

## 2025-01-06 NOTE — ANESTHESIA POSTPROCEDURE EVALUATION
Department of Anesthesiology  Postprocedure Note    Patient: Carlota Zhou  MRN: [id number]  YOB: 1975  Date of evaluation: 1/6/2025    Procedure Summary       Date: 01/06/25 Room / Location: [facility] OR  / [facility] OR    Anesthesia Start: 0737 Anesthesia Stop: 1054    Procedure: Partial Submucosal Resection of Right and Left Inferior Nasal Turbinates, Partial Resection of Leela Bullosa of Left Middle Nasal Turbinate, Image Guided Nasal Endoscopy with bialteral maxillary antrostomy with removal of tissue from maxillary sinus ( missed maxillary ostium) Left Sphenoidotomy (Nose) Diagnosis:       Nasal obstruction      Leela bullosa      Ostiomeatal complex obstruction of paranasal sinus      Loud snoring      Hypertrophy of both inferior nasal turbinates      Chronic ethmoidal sinusitis      Chronic maxillary sinusitis      Chronic sphenoidal sinusitis      Chronic nonintractable headache, unspecified headache type      (Nasal obstruction [J34.89])      (Leela bullosa [J34.89])      (Ostiomeatal complex obstruction of paranasal sinus [J34.89])      (Loud snoring [R06.83])      (Hypertrophy of both inferior nasal turbinates [J34.3])      (Chronic ethmoidal sinusitis [J32.2])      (Chronic maxillary sinusitis [J32.0])      (Chronic sphenoidal sinusitis [J32.3])      (Chronic nonintractable headache, unspecified headache type [R51.9, G89.29])    Surgeons: Rodrigo Nixon MD Responsible Provider: Stephan Solis DO    Anesthesia Type: general ASA Status: 3            Anesthesia Type: No value filed.    Jorge A Phase I: Jorge A Score: 9    Jorge A Phase II: Jorge A Score: 10    Anesthesia Post Evaluation    Patient location during evaluation: PACU  Patient participation: complete - patient participated  Level of consciousness: awake  Airway patency: patent  Nausea & Vomiting: no nausea  Cardiovascular status: hemodynamically stable  Respiratory status: acceptable  Hydration status: stable  Pain management:

## 2025-01-06 NOTE — INTERVAL H&P NOTE
Pt Name: Carlota Zhou  MRN: [id number]  YOB: 1975  Date of evaluation: 1/6/2025    I have examined the patient and reviewed the H&P/Consult and there are no changes to the patient or plans.         Electronically signed by SHARI CRANDALL MD on 1/6/2025 at 7:37 AM

## 2025-01-07 ENCOUNTER — TELEPHONE (OUTPATIENT)
Dept: ENT CLINIC | Age: 50
End: 2025-01-07

## 2025-01-07 ENCOUNTER — OFFICE VISIT (OUTPATIENT)
Dept: ENT CLINIC | Age: 50
End: 2025-01-07
Payer: COMMERCIAL

## 2025-01-07 VITALS
BODY MASS INDEX: 30.67 KG/M2 | DIASTOLIC BLOOD PRESSURE: 80 MMHG | SYSTOLIC BLOOD PRESSURE: 148 MMHG | TEMPERATURE: 97.1 F | HEIGHT: 66 IN | HEART RATE: 83 BPM | WEIGHT: 190.8 LBS | OXYGEN SATURATION: 98 %

## 2025-01-07 DIAGNOSIS — J32.0 CHRONIC MAXILLARY SINUSITIS: ICD-10-CM

## 2025-01-07 DIAGNOSIS — J32.3 CHRONIC SPHENOIDAL SINUSITIS: ICD-10-CM

## 2025-01-07 DIAGNOSIS — G89.18 ACUTE POST-OPERATIVE PAIN: Primary | ICD-10-CM

## 2025-01-07 DIAGNOSIS — J34.89 CONCHA BULLOSA: ICD-10-CM

## 2025-01-07 DIAGNOSIS — J32.2 CHRONIC ETHMOIDAL SINUSITIS: ICD-10-CM

## 2025-01-07 DIAGNOSIS — J34.3 HYPERTROPHY OF BOTH INFERIOR NASAL TURBINATES: ICD-10-CM

## 2025-01-07 DIAGNOSIS — J32.2 CHRONIC ETHMOIDAL SINUSITIS: Primary | ICD-10-CM

## 2025-01-07 PROCEDURE — 3079F DIAST BP 80-89 MM HG: CPT | Performed by: OTOLARYNGOLOGY

## 2025-01-07 PROCEDURE — 99212 OFFICE O/P EST SF 10 MIN: CPT | Performed by: OTOLARYNGOLOGY

## 2025-01-07 PROCEDURE — 3077F SYST BP >= 140 MM HG: CPT | Performed by: OTOLARYNGOLOGY

## 2025-01-07 RX ORDER — OXYCODONE HYDROCHLORIDE 5 MG/1
5 TABLET ORAL EVERY 6 HOURS PRN
Qty: 20 TABLET | Refills: 0 | Status: SHIPPED | OUTPATIENT
Start: 2025-01-07 | End: 2025-01-12

## 2025-01-07 RX ORDER — OXYCODONE HYDROCHLORIDE 5 MG/1
5 TABLET ORAL EVERY 6 HOURS PRN
Qty: 20 TABLET | Refills: 0 | Status: CANCELLED | OUTPATIENT
Start: 2025-01-07 | End: 2025-01-12

## 2025-01-07 NOTE — PROGRESS NOTES
[facility] [place] [facility]  [facility] EAR, NOSE AND THROAT  [address]  [address]  [facility] [address]  Dept: [phone]  Dept Fax: [phone]  Loc: [phone]    Carlota Zhou is a 49 y.o. female who was referred by No ref. provider found for:  Chief Complaint   Patient presents with    Post-Op Check     01/06 smr, sowmya, ethmoid, max, sphenoid (0 global)     .    HPI:     Carlota Zhou is a 49 y.o. female who presents today for ***.    History:     Allergies   Allergen Reactions    Cinnamon Other (See Comments)     Migraine. Lip Swelling. Throat Itching.    Other Reaction(s): Unknown    Peach [Prunus Persica] Itching and Swelling    Codeine Hives and Itching     Other Reaction(s): Unknown, Unknown by Patient    Peach Flavoring Agent (Non-Screening) Itching and Swelling    Flavoring Agent      Other Reaction(s): Unknown    Acetaminophen      Other Reaction(s): Unknown by Patient    Griseofulvin Ultramicrosize [Griseofulvin] Itching    Lamotrigine Itching and Other (See Comments)     Other Reaction(s): Lamictal, sleepy, sleepy, sleepy, Unknown by Patient    Promethazine Hcl      Other Reaction(s): Unknown, Unknown by Patient    Tramadol Itching, Other (See Comments), Hallucinations and Rash     Gets aggressive    Other Reaction(s): dizzy, faint, nauea, itch, dizzy, faint, nauea, itch, dizzy, faint, nauea, itch, tramadol, Unknown, Unknown by Patient     Current Outpatient Medications   Medication Sig Dispense Refill    oxyCODONE (ROXICODONE) 5 MG immediate release tablet Take 1 tablet by mouth every 6 hours as needed for Pain for up to 5 days. Intended supply: 5 days. Take lowest dose possible to manage pain Max Daily Amount: 20 mg 20 tablet 0    predniSONE (DELTASONE) 20 MG tablet One tablet every morning for three days, then one-half tablet for two days. Start the day AFTER surgery. 4 tablet 0    cefdinir (OMNICEF) 300 MG capsule Take 1 capsule by mouth 2 times daily for 10

## 2025-01-07 NOTE — TELEPHONE ENCOUNTER
I called [facility] and spoke with Terrance. He said the other 3 were filled, but they do not carry Oxycodone.

## 2025-01-07 NOTE — TELEPHONE ENCOUNTER
I see Oxycodone was sent to [facility] pharmacy by Dr. Nixon yesterday 1/6/25    Can we please call pharmacy and ensure patient was unable to receive medication/was not dispensed prior to sending to new pharmacy.    Can we also ensure patient was able to receive all of her other post operative medications that were ordered by Dr. Nixon including:  Cefdinir  Prednisone  Pepcid

## 2025-01-07 NOTE — TELEPHONE ENCOUNTER
Patient said that she was unable to get the [place] filled. The pharmacy was out she would like to know if you could resend to [facility] on [address] please.

## 2025-01-07 NOTE — TELEPHONE ENCOUNTER
I called patient and let her know that new Rx was sent to the [facility] on [address]. She thanked me.

## 2025-01-08 LAB
BACTERIA SPEC AEROBE CULT: ABNORMAL
GRAM STN SPEC: ABNORMAL
ORGANISM: ABNORMAL

## 2025-01-09 NOTE — OP NOTE
Operative Note      Patient: Carlota Zhou  YOB: 1975  MRN: [id number]    Date of Procedure: 1/6/2025    Pre-Op Diagnosis Codes:      * Nasal obstruction [J34.89]     * Leela bullosa [J34.89]     * Ostiomeatal complex obstruction of paranasal sinus [J34.89]     * Loud snoring [R06.83]     * Hypertrophy of both inferior nasal turbinates [J34.3]     * Chronic ethmoidal sinusitis [J32.2]     * Chronic maxillary sinusitis [J32.0]     * Chronic sphenoidal sinusitis [J32.3]     * Chronic nonintractable headache, unspecified headache type [R51.9, G89.29]     Post-Op Diagnosis: Same and nasaal septal perforation, adhesions of middle turbinates and left superior turbinate to the sseptum       Procedure(s):  Partial Submucosal Resection of Right Inferior Nasal Turbinate, Partial Resection of Leela Bullosa of Left Middle Nasal Turbinate, Image Guided Nasal Endoscopy with bilateral maxillary antrostomy without removal of tissue from maxillary sinus (missed maxillary ostium) Left Sphenoidotomy (missed natural sphenoid ostium), bilateral lysis of adhesions septum to turbinate, anterior ethmoidectomy right side, stereotactic computerized image guidance for sinus surgery.     Surgeon(s):  Rodrigo Nixon MD     Assistant:  * No surgical staff found *     Anesthesia: General     Estimated Blood Loss (mL): less than 100      Complications: None     Specimens:   ID Type Source Tests Collected by Time Destination   1 : Nasal Fossa Swab Nose CULTURE, AEROBIC Rodrigo Nixon MD 1/6/2025 0858           Implants:  * No implants in log *      Drains: * No LDAs found *     Findings:   The natural ostia for the maxillary sinuses and the left sphenoid were not included in the prior window was placed.  This caused reentry of secretions and in fact I took a photograph of thick secretions coming out of the left natural ostium and returning into the left maxillary nasal antral window.  Significant resection of the middle  turbinates had been performed but there was a persistent sowmya bullosae left middle turbinate with an air-fluid level on the CT scan.  Both middle turbinates were adherent to the septum and there were 2 pre-existing septal perforations noted.  Right inferior turbinate was quite large and obstructing and a partial submucosal resection was performed with a 2 mm microdebrider blade.  An anterior partial ethmoidectomy was performed on the right side to open up opacified cells.  Ethmoidectomies were otherwise deferred since they were mainly clear and once I had corrected the other problems, drainage pathways seemed intact.    Detailed Description of Procedure:   After an adequate level of general endotracheal anesthesia had been obtained, the face was prepped and draped in an aseptic fashion in the usual manner for nasal surgery.  Nose was decongested vasoconstricted and anesthetized in the usual manner. Stereotactic registration was then performed.  Accuracy was rated within 1 mm or better.  This remained accurate throughout the surgery.  This was used extensively.     The right inferior turbinate was partially submucosally resected using the Turbinator through an anterior entry point, treating the medial surface of the length of the turbinate.  Turbinate was then fractured laterally.  Cottonoids impregnated with Afrin were placed against it.    Microdebrider was used to open some opacified ethmoid cells on the right side that were very anterior and superior.    Right maxillary antrostomy was then addressed.  Photographs were taken.  A significant portion of the uncinate remained as seen on the CT scan.  This was removed.  Natural ostium was identified just anterior to the large nasal antral window created previously by a different surgeon.    Lateral aspect conchal bullosa of the left middle turbinate was then resected with [employer] microdebrider.  Margins were lightly cauterized with bipolar cautery.  Mucosal

## 2025-01-14 ENCOUNTER — OFFICE VISIT (OUTPATIENT)
Dept: ENT CLINIC | Age: 50
End: 2025-01-14
Payer: COMMERCIAL

## 2025-01-14 VITALS
HEART RATE: 105 BPM | OXYGEN SATURATION: 100 % | TEMPERATURE: 97.9 F | BODY MASS INDEX: 30.13 KG/M2 | HEIGHT: 66 IN | SYSTOLIC BLOOD PRESSURE: 128 MMHG | DIASTOLIC BLOOD PRESSURE: 80 MMHG | WEIGHT: 187.5 LBS

## 2025-01-14 DIAGNOSIS — J32.3 CHRONIC SPHENOIDAL SINUSITIS: Primary | ICD-10-CM

## 2025-01-14 DIAGNOSIS — J34.89 CONCHA BULLOSA: ICD-10-CM

## 2025-01-14 DIAGNOSIS — J32.2 CHRONIC ETHMOIDAL SINUSITIS: ICD-10-CM

## 2025-01-14 DIAGNOSIS — J34.89 NASAL OBSTRUCTION: ICD-10-CM

## 2025-01-14 DIAGNOSIS — J34.3 HYPERTROPHY OF BOTH INFERIOR NASAL TURBINATES: ICD-10-CM

## 2025-01-14 DIAGNOSIS — J32.0 CHRONIC MAXILLARY SINUSITIS: ICD-10-CM

## 2025-01-14 PROCEDURE — 3079F DIAST BP 80-89 MM HG: CPT | Performed by: OTOLARYNGOLOGY

## 2025-01-14 PROCEDURE — 99212 OFFICE O/P EST SF 10 MIN: CPT | Performed by: OTOLARYNGOLOGY

## 2025-01-14 PROCEDURE — 3074F SYST BP LT 130 MM HG: CPT | Performed by: OTOLARYNGOLOGY

## 2025-01-15 NOTE — PROGRESS NOTES
[facility] [place] [facility]  [facility] EAR, NOSE AND THRO[facility]  [address]  [address]  [facility] [address]  Dept: [phone]  Dept Fax: [phone]  Loc: [phone]    Carlota Zhou is a 49 y.o. female who was referred byNo ref. provider found for:  Chief Complaint   Patient presents with    Post-Op Check     01/06 smr, sowmya, ethmoid, max, sphenoid (0 global). Patient states that she is doing fine.     .    HPI:     Carlota Zhou is a 49 y.o. female who presents today for follow-up on surgery yesterday. The patient has some nasal congestion.  The pain medication is adequate.  They were using their Afrin.  She is breathing better and seems to have tolerated the surgery well    History:     Allergies   Allergen Reactions    Cinnamon Other (See Comments)     Migraine. Lip Swelling. Throat Itching.    Other Reaction(s): Unknown    Peach [Prunus Persica] Itching and Swelling    Codeine Hives and Itching     Other Reaction(s): Unknown, Unknown by Patient    Peach Flavoring Agent (Non-Screening) Itching and Swelling    Flavoring Agent      Other Reaction(s): Unknown    Acetaminophen      Other Reaction(s): Unknown by Patient    Griseofulvin Ultramicrosize [Griseofulvin] Itching    Lamotrigine Itching and Other (See Comments)     Other Reaction(s): Lamictal, sleepy, sleepy, sleepy, Unknown by Patient    Promethazine Hcl      Other Reaction(s): Unknown, Unknown by Patient    Tramadol Itching, Other (See Comments), Hallucinations and Rash     Gets aggressive    Other Reaction(s): dizzy, faint, nauea, itch, dizzy, faint, nauea, itch, dizzy, faint, nauea, itch, tramadol, Unknown, Unknown by Patient     Current Outpatient Medications   Medication Sig Dispense Refill    predniSONE (DELTASONE) 20 MG tablet One tablet every morning for three days, then one-half tablet for two days. Start the day AFTER surgery. 4 tablet 0    cefdinir (OMNICEF) 300 MG capsule Take 1 capsule by mouth 2 times daily

## 2025-01-20 ENCOUNTER — HOSPITAL ENCOUNTER (EMERGENCY)
Age: 50
Discharge: HOME | End: 2025-01-20
Attending: FAMILY MEDICINE
Payer: COMMERCIAL

## 2025-01-20 VITALS
HEART RATE: 92 BPM | SYSTOLIC BLOOD PRESSURE: 156 MMHG | OXYGEN SATURATION: 97 % | DIASTOLIC BLOOD PRESSURE: 101 MMHG | WEIGHT: 190 LBS | BODY MASS INDEX: 30.53 KG/M2 | HEIGHT: 66 IN | RESPIRATION RATE: 18 BRPM | TEMPERATURE: 97.6 F

## 2025-01-20 DIAGNOSIS — R04.0 EPISTAXIS: Primary | ICD-10-CM

## 2025-01-20 PROCEDURE — 6370000000 HC RX 637 (ALT 250 FOR IP)

## 2025-01-20 PROCEDURE — 99282 EMERGENCY DEPT VISIT SF MDM: CPT

## 2025-01-20 PROCEDURE — 99283 EMERGENCY DEPT VISIT LOW MDM: CPT

## 2025-01-20 RX ORDER — OXYMETAZOLINE HYDROCHLORIDE 0.05 G/100ML
3 SPRAY NASAL ONCE
Status: COMPLETED | OUTPATIENT
Start: 2025-01-20 | End: 2025-01-20

## 2025-01-20 RX ADMIN — OXYMETHAZOLINE HCL 3 SPRAY: 0.05 SPRAY NASAL at 20:27

## 2025-01-20 ASSESSMENT — PAIN - FUNCTIONAL ASSESSMENT: PAIN_FUNCTIONAL_ASSESSMENT: NONE - DENIES PAIN

## 2025-01-21 ENCOUNTER — OFFICE VISIT (OUTPATIENT)
Dept: ENT CLINIC | Age: 50
End: 2025-01-21
Payer: COMMERCIAL

## 2025-01-21 VITALS
HEART RATE: 81 BPM | HEIGHT: 66 IN | SYSTOLIC BLOOD PRESSURE: 124 MMHG | BODY MASS INDEX: 30.05 KG/M2 | WEIGHT: 187 LBS | RESPIRATION RATE: 18 BRPM | OXYGEN SATURATION: 98 % | DIASTOLIC BLOOD PRESSURE: 80 MMHG | TEMPERATURE: 98.1 F

## 2025-01-21 DIAGNOSIS — J32.3 CHRONIC SPHENOIDAL SINUSITIS: Primary | ICD-10-CM

## 2025-01-21 DIAGNOSIS — J32.0 CHRONIC MAXILLARY SINUSITIS: ICD-10-CM

## 2025-01-21 DIAGNOSIS — J32.2 CHRONIC ETHMOIDAL SINUSITIS: ICD-10-CM

## 2025-01-21 DIAGNOSIS — J34.89 OSTIOMEATAL COMPLEX OBSTRUCTION OF NASAL SINUS: ICD-10-CM

## 2025-01-21 PROCEDURE — 3074F SYST BP LT 130 MM HG: CPT | Performed by: OTOLARYNGOLOGY

## 2025-01-21 PROCEDURE — 99212 OFFICE O/P EST SF 10 MIN: CPT | Performed by: OTOLARYNGOLOGY

## 2025-01-21 PROCEDURE — 31237 NSL/SINS NDSC SURG BX POLYPC: CPT | Performed by: OTOLARYNGOLOGY

## 2025-01-21 PROCEDURE — 3079F DIAST BP 80-89 MM HG: CPT | Performed by: OTOLARYNGOLOGY

## 2025-01-21 NOTE — ED TRIAGE NOTES
Patient presents to the ed with c/o nosebleed that started 30 mins pta. Pt states that she was washing her hands and felt that something was in her throat and then the nosebleed started. Pt states that she had a recent surgery on her nose 3 weeks ago. Pt states that she has not been on her blood thinners since. Pt applying pressure to her nose at this time

## 2025-01-21 NOTE — DISCHARGE INSTRUCTIONS
You were seen and evaluated emergency department today for nosebleed also known as epistaxis.  You were given Afrin spray 3 to on each side with improvement of bleeding.  You may take this bottle at home and do multiple sprays in each side of the nose for any further bleeding that occurs.    Follow-up with Dr. Nixon as scheduled tomorrow morning.    Return to ED at anytime for significant bleeding uncontrolled at home, any other worrisome changes or concerns.

## 2025-01-21 NOTE — ED PROVIDER NOTES
[facility] EMERGENCY DEPARTMENT  EMERGENCY DEPARTMENT ENCOUNTER          Pt Name: Carlota Zhou  MRN: [id number]  Birthdate 1975  Date of evaluation: 1/20/2025  Resident Physician: Braulio Andrade MD EM Resident PGY-3  Attending Physician: Stan Fuller MD      CHIEF COMPLAINT       Chief Complaint   Patient presents with    Epistaxis         HISTORY OF PRESENT ILLNESS    HPI  Carlota Zhou is a 49 y.o. female who presents to the emergency department from home, as a walk in to the ED lobby for evaluation of epistaxis.    Patient states that she underwent nasal turbinate reduction by Dr. Nixon 2 weeks prior.  States that she has been doing her regular postoperative care when today she had a slight cough felt some pressure in her nose and began to have bleeding.  States that she was previously on aspirin daily but has held it for the past 3 weeks.  Denies any fall or injury prior to onset of symptoms.      The patient has no other acute complaints at this time.    ROS negative except as stated above.    PAST MEDICAL AND SURGICAL HISTORY     Past Medical History:   Diagnosis Date    Allergic rhinitis     Allergy     Anxiety     Arthritis     Asthma     Bipolar disorder (HCC)     Bronchitis 2006    Cancer (HCC) 1992    cervical    Depression     Diabetes mellitus (HCC)     History of bronchitis     History of kidney stones     Hyperlipidemia     Hypertension     Ligament tear     left foot    Osteoarthritis     Osteoporosis     Psoriasis     Seizure (HCC)      Past Surgical History:   Procedure Laterality Date    APPENDECTOMY  1983    BACK INJECTION Left 03/15/2022    left Si injection #1 performed by Ricci Kat DO at [facility] [place] OR    BACK INJECTION Left 06/09/2022    Left Si injection #2 performed by Jules Rios MD at [facility] OR    BONY PELVIS SURGERY  2011    Screws removed-OIO    BREAST BIOPSY  08/06/2012    Bilateral Breast biopsies x2    BREAST LUMPECTOMY

## 2025-01-22 ENCOUNTER — HOSPITAL ENCOUNTER (EMERGENCY)
Age: 50
Discharge: HOME | End: 2025-01-22
Payer: COMMERCIAL

## 2025-01-22 ENCOUNTER — TELEPHONE (OUTPATIENT)
Dept: ENT CLINIC | Age: 50
End: 2025-01-22

## 2025-01-22 VITALS
WEIGHT: 183 LBS | SYSTOLIC BLOOD PRESSURE: 103 MMHG | HEIGHT: 66 IN | BODY MASS INDEX: 29.41 KG/M2 | DIASTOLIC BLOOD PRESSURE: 76 MMHG | OXYGEN SATURATION: 97 % | RESPIRATION RATE: 15 BRPM | HEART RATE: 99 BPM

## 2025-01-22 DIAGNOSIS — E11.65 TYPE 2 DIABETES MELLITUS WITH HYPERGLYCEMIA, WITHOUT LONG-TERM CURRENT USE OF INSULIN (HCC): ICD-10-CM

## 2025-01-22 DIAGNOSIS — R04.0 ACUTE ANTERIOR EPISTAXIS: Primary | ICD-10-CM

## 2025-01-22 DIAGNOSIS — D64.9 NORMOCYTIC ANEMIA: ICD-10-CM

## 2025-01-22 LAB
ALBUMIN SERPL BCG-MCNC: 4 G/DL (ref 3.5–5.1)
ALP SERPL-CCNC: 69 U/L (ref 38–126)
ALT SERPL W/O P-5'-P-CCNC: 16 U/L (ref 11–66)
ANION GAP SERPL CALC-SCNC: 13 MEQ/L (ref 8–16)
APTT PPP: 27.6 SECONDS (ref 22–38)
AST SERPL-CCNC: 13 U/L (ref 5–40)
BASOPHILS ABSOLUTE: 0 THOU/MM3 (ref 0–0.1)
BASOPHILS NFR BLD AUTO: 0.5 %
BILIRUB SERPL-MCNC: 0.5 MG/DL (ref 0.3–1.2)
BUN SERPL-MCNC: 9 MG/DL (ref 7–22)
CALCIUM SERPL-MCNC: 8.8 MG/DL (ref 8.5–10.5)
CHLORIDE SERPL-SCNC: 100 MEQ/L (ref 98–111)
CO2 SERPL-SCNC: 23 MEQ/L (ref 23–33)
CREAT SERPL-MCNC: 0.7 MG/DL (ref 0.4–1.2)
DEPRECATED RDW RBC AUTO: 39.6 FL (ref 35–45)
EKG ATRIAL RATE: 118 BPM
EKG P AXIS: 27 DEGREES
EKG P-R INTERVAL: 122 MS
EKG Q-T INTERVAL: 320 MS
EKG QRS DURATION: 74 MS
EKG QTC CALCULATION (BAZETT): 448 MS
EKG R AXIS: -20 DEGREES
EKG T AXIS: 36 DEGREES
EKG VENTRICULAR RATE: 118 BPM
EOSINOPHIL NFR BLD AUTO: 1.9 %
EOSINOPHILS ABSOLUTE: 0.1 THOU/MM3 (ref 0–0.4)
ERYTHROCYTE [DISTWIDTH] IN BLOOD BY AUTOMATED COUNT: 12.9 % (ref 11.5–14.5)
GFR SERPL CREATININE-BSD FRML MDRD: > 90 ML/MIN/1.73M2
GLUCOSE BLD STRIP.AUTO-MCNC: 431 MG/DL (ref 70–108)
GLUCOSE SERPL-MCNC: 437 MG/DL (ref 70–108)
HCT VFR BLD AUTO: 36.2 % (ref 37–47)
HGB BLD-MCNC: 11.4 GM/DL (ref 12–16)
IMM GRANULOCYTES # BLD AUTO: 0.02 THOU/MM3 (ref 0–0.07)
IMM GRANULOCYTES NFR BLD AUTO: 0.3 %
INR PPP: 1.03 (ref 0.85–1.13)
LYMPHOCYTES ABSOLUTE: 3 THOU/MM3 (ref 1–4.8)
LYMPHOCYTES NFR BLD AUTO: 46.2 %
MCH RBC QN AUTO: 26.3 PG (ref 26–33)
MCHC RBC AUTO-ENTMCNC: 31.5 GM/DL (ref 32.2–35.5)
MCV RBC AUTO: 83.6 FL (ref 81–99)
MONOCYTES ABSOLUTE: 0.5 THOU/MM3 (ref 0.4–1.3)
MONOCYTES NFR BLD AUTO: 7.8 %
NEUTROPHILS ABSOLUTE: 2.8 THOU/MM3 (ref 1.8–7.7)
NEUTROPHILS NFR BLD AUTO: 43.3 %
NRBC BLD AUTO-RTO: 0 /100 WBC
OSMOLALITY SERPL CALC.SUM OF ELEC: 289.5 MOSMOL/KG (ref 275–300)
PLATELET # BLD AUTO: 256 THOU/MM3 (ref 130–400)
PMV BLD AUTO: 10.8 FL (ref 9.4–12.4)
POTASSIUM SERPL-SCNC: 4 MEQ/L (ref 3.5–5.2)
PROT SERPL-MCNC: 6.4 G/DL (ref 6.1–8)
RBC # BLD AUTO: 4.33 MILL/MM3 (ref 4.2–5.4)
SODIUM SERPL-SCNC: 136 MEQ/L (ref 135–145)
WBC # BLD AUTO: 6.4 THOU/MM3 (ref 4.8–10.8)

## 2025-01-22 PROCEDURE — 6370000000 HC RX 637 (ALT 250 FOR IP): Performed by: PHYSICIAN ASSISTANT

## 2025-01-22 PROCEDURE — 93010 ELECTROCARDIOGRAM REPORT: CPT | Performed by: INTERNAL MEDICINE

## 2025-01-22 PROCEDURE — 85610 PROTHROMBIN TIME: CPT

## 2025-01-22 PROCEDURE — 36415 COLL VENOUS BLD VENIPUNCTURE: CPT

## 2025-01-22 PROCEDURE — 93005 ELECTROCARDIOGRAM TRACING: CPT | Performed by: EMERGENCY MEDICINE

## 2025-01-22 PROCEDURE — 96361 HYDRATE IV INFUSION ADD-ON: CPT

## 2025-01-22 PROCEDURE — 82948 REAGENT STRIP/BLOOD GLUCOSE: CPT

## 2025-01-22 PROCEDURE — 85025 COMPLETE CBC W/AUTO DIFF WBC: CPT

## 2025-01-22 PROCEDURE — 80053 COMPREHEN METABOLIC PANEL: CPT

## 2025-01-22 PROCEDURE — 99284 EMERGENCY DEPT VISIT MOD MDM: CPT

## 2025-01-22 PROCEDURE — 85730 THROMBOPLASTIN TIME PARTIAL: CPT

## 2025-01-22 PROCEDURE — 30903 CONTROL OF NOSEBLEED: CPT

## 2025-01-22 PROCEDURE — 96360 HYDRATION IV INFUSION INIT: CPT

## 2025-01-22 PROCEDURE — 2580000003 HC RX 258: Performed by: PHYSICIAN ASSISTANT

## 2025-01-22 RX ORDER — AMOXICILLIN 500 MG/1
500 CAPSULE ORAL 3 TIMES DAILY
Qty: 21 CAPSULE | Refills: 0 | Status: SHIPPED | OUTPATIENT
Start: 2025-01-22 | End: 2025-01-29

## 2025-01-22 RX ORDER — 0.9 % SODIUM CHLORIDE 0.9 %
1000 INTRAVENOUS SOLUTION INTRAVENOUS ONCE
Status: COMPLETED | OUTPATIENT
Start: 2025-01-22 | End: 2025-01-22

## 2025-01-22 RX ORDER — INSULIN LISPRO 100 [IU]/ML
8 INJECTION, SOLUTION INTRAVENOUS; SUBCUTANEOUS ONCE
Status: COMPLETED | OUTPATIENT
Start: 2025-01-22 | End: 2025-01-22

## 2025-01-22 RX ORDER — LORAZEPAM 2 MG/ML
1 INJECTION INTRAMUSCULAR ONCE
Status: DISCONTINUED | OUTPATIENT
Start: 2025-01-22 | End: 2025-01-22 | Stop reason: HOSPADM

## 2025-01-22 RX ORDER — OXYMETAZOLINE HYDROCHLORIDE 0.05 G/100ML
2 SPRAY NASAL ONCE
Status: DISCONTINUED | OUTPATIENT
Start: 2025-01-22 | End: 2025-01-22 | Stop reason: HOSPADM

## 2025-01-22 RX ADMIN — INSULIN LISPRO 8 UNITS: 100 INJECTION, SOLUTION INTRAVENOUS; SUBCUTANEOUS at 11:24

## 2025-01-22 RX ADMIN — SODIUM CHLORIDE 1000 ML: 9 INJECTION, SOLUTION INTRAVENOUS at 11:26

## 2025-01-22 ASSESSMENT — PAIN SCALES - GENERAL
PAINLEVEL_OUTOF10: 5
PAINLEVEL_OUTOF10: 4

## 2025-01-22 ASSESSMENT — PAIN - FUNCTIONAL ASSESSMENT
PAIN_FUNCTIONAL_ASSESSMENT: 0-10
PAIN_FUNCTIONAL_ASSESSMENT: NONE - DENIES PAIN
PAIN_FUNCTIONAL_ASSESSMENT: 0-10
PAIN_FUNCTIONAL_ASSESSMENT: NONE - DENIES PAIN

## 2025-01-22 ASSESSMENT — PAIN DESCRIPTION - LOCATION: LOCATION: FACE

## 2025-01-22 NOTE — TELEPHONE ENCOUNTER
Patient needs visit either Thursday or Friday. Would benefit from appt tomorrow for packing removal and post operative assessment with Dr. Nixon.     Otherwise; can schedule with me or Kimberly on Friday.

## 2025-01-22 NOTE — PROGRESS NOTES
[facility] [place] [facility]  [facility] EAR, NOSE AND THROAT  [address]  [address]  [facility] [address]  Dept: [phone]  Dept Fax: [phone]  Loc: [phone]    Carlota Zhou is a 49 y.o. female who was referred byNo ref. provider found for:  Chief Complaint   Patient presents with    Post-Op Check     Patient here for a post op smr, sowmya, ethmoid, max sphenoid. Patient states that her throat is itchy and she is coughing, she also has a dry throat. Patient states that she has small amount of taste but can not smell very much.    .    HPI:     Carlota Zhou is a 49 y.o. female who presents today for follow-up on nasal surgery a week ago. The patient has some nasal congestion.  The pain medication is adequate.  They stopped using their Afrin and they are performing the buffered saline nasal irrigations. Nasal airway is improved.  Facial pressure is improved.    History:     Allergies   Allergen Reactions    Cinnamon Other (See Comments)     Migraine. Lip Swelling. Throat Itching.    Other Reaction(s): Unknown    Peach [Prunus Persica] Itching and Swelling    Codeine Hives and Itching     Other Reaction(s): Unknown, Unknown by Patient    Peach Flavoring Agent (Non-Screening) Itching and Swelling    Flavoring Agent      Other Reaction(s): Unknown    Acetaminophen      Other Reaction(s): Unknown by Patient    Griseofulvin Ultramicrosize [Griseofulvin] Itching    Lamotrigine Itching and Other (See Comments)     Other Reaction(s): Lamictal, sleepy, sleepy, sleepy, Unknown by Patient    Promethazine Hcl      Other Reaction(s): Unknown, Unknown by Patient    Tramadol Itching, Other (See Comments), Hallucinations and Rash     Gets aggressive    Other Reaction(s): dizzy, faint, nauea, itch, dizzy, faint, nauea, itch, dizzy, faint, nauea, itch, tramadol, Unknown, Unknown by Patient     Current Outpatient Medications   Medication Sig Dispense Refill    predniSONE (DELTASONE) 20 MG

## 2025-01-22 NOTE — ED NOTES
Pt resting on cot at this time. This RN attempted IV 2x with no success. Giselle RN at bedside attempting IV. Client provided with warm blankets. Family at bedside. Call light in reach

## 2025-01-22 NOTE — ED PROVIDER NOTES
diagnostic studies and exam findings were discussed. The patient’s provisional diagnosis and plan of care were discussed with the patient and present family who expressed understanding. Any medications were reviewed and indications and risks of medications were discussed with the patient /family present. Strict verbal and written return precautions, instructions and appropriate follow-up provided to  the patient  .     ED Medications administered this visit:  (None if blank)  Medications   oxymetazoline (AFRIN) 0.05 % nasal spray 2 spray (has no administration in time range)   lidocaine-EPINEPHrine-tetracaine-sodium metabisulfite (LETS) topical solution (has no administration in time range)   LORazepam (ATIVAN) injection 1 mg (has no administration in time range)   insulin lispro (HUMALOG,ADMELOG) injection vial 8 Units (8 Units SubCUTAneous Given 1/22/25 1124)   sodium chloride 0.9 % bolus 1,000 mL (1,000 mLs IntraVENous New Bag 1/22/25 1126)         PROCEDURES: (None if blank)  Epistaxis Mgmt    Date/Time: 1/22/2025 11:34 AM    Performed by: Erick Jerry PA  Authorized by: Erick Jerry PA    Consent:     Consent obtained:  Verbal    Consent given by:  Patient    Risks discussed:  Bleeding, infection, nasal injury and pain    Alternatives discussed:  Alternative treatment  Universal protocol:     Procedure explained and questions answered to patient or proxy's satisfaction: yes      Patient identity confirmed:  Verbally with patient  Anesthesia:     Anesthesia method:  Topical application  Procedure details:     Treatment site:  L anterior    Treatment method:  Nasal balloon    Treatment complexity:  Limited    Treatment episode: recurring    Post-procedure details:     Assessment:  Bleeding stopped    Procedure completion:  Tolerated  :     CRITICAL CARE: (None if blank)      DISCHARGE PRESCRIPTIONS: (None if blank)  New Prescriptions    AMOXICILLIN (AMOXIL) 500 MG CAPSULE    Take 1 capsule by mouth 3 times

## 2025-01-22 NOTE — ED NOTES
RN called to room. Pt found sitting upright on the floor in front of the sink. Pt states that she was trying to wash her hands and became very dizzy. Pt states she sat herself on the floor. Pt was able to stand and return to bed with assistance. Pt color appears pale.

## 2025-01-22 NOTE — TELEPHONE ENCOUNTER
Patient called in and sure is currently in the ER department for Epistaxis. She said that they are placing packing in her nose and she said they told her that she needs to return to ENT by Friday. Patient had surgery with Dr. Nixon on 01/06/25 for:   Partial Submucosal Resection of Right and Left Inferior Nasal Turbinates, Partial Resection of Leela Bullosa of Left Middle Nasal Turbinate, Image Guided Nasal Endoscopy with bialteral maxillary antrostomy with removal of tissue from maxillary sinus ( missed maxillary ostium) Left Sphenoidotomy.   Patient was in office on 01/14/25 and was c/o random nosebleeds on that visit. Please advise on how soon patient needs to be rescheduled with us. Patients next appt is on 02/04/25 with Dr. Nixon

## 2025-01-22 NOTE — DISCHARGE INSTRUCTIONS
Follow-up with Dr. Nixon on Friday as planned.  Leave the packing in place until follow-up.  Monitor your blood sugar as your blood sugar was elevated today.  Call your regular physician to set up a follow-up appointment to be rechecked in 1 to 2 days.    Take the antibiotics as directed.    Discharge warning    Please remember that examination and testing performed in the emergency department is not a comprehensive evaluation of all medical conditions and does not replace the need to follow up with your primary care provider.  In the emergency department, we are only able to evaluate your symptoms in the current condition, but symptoms may change or worsen.  Although you are felt safe to be discharged today, if your symptoms persist or change, you need to be re-evaluated by your regular/primary care doctor as soon as possible.  If you are unable to make appointment with your regular doctor, please come back to the ER to be re-evaluated.

## 2025-01-22 NOTE — ED TRIAGE NOTES
Pt presents to ED with c/o of nosebleeds. Pt states she had sinus sx 2 weeks ago. Pt states she was seen in this ED on 1/20/25 for same complaint. She followed up with Dr. Nixon 1/21/25, and was told there were no problems. Pt states bleeding started around 45 min ago. Pt states this is \"the worse it has been.\" Pt using basin for blood, and this RN notes blood clots in basin. Pt reports feeling \"woozy.\" EKG complete. Family at bedside. No needs stated.

## 2025-01-22 NOTE — ED NOTES
Pt on cot applying pressure to nares. Output from nares noted to be brown in color. Pt denies needs at this time. Family at bedside. Call light in reach.

## 2025-01-23 NOTE — PROGRESS NOTES
by mouth daily      azelastine (OPTIVAR) 0.05 % ophthalmic solution as needed      cyclobenzaprine (FLEXERIL) 5 MG tablet Take 1 tablet by mouth as needed for Muscle spasms      omeprazole (PRILOSEC) 40 MG delayed release capsule Take 1 capsule by mouth daily      gabapentin (NEURONTIN) 300 MG capsule Take 1 capsule by mouth nightly for 30 days. 30 capsule 1    amLODIPine (NORVASC) 10 MG tablet Take 1 tablet by mouth daily      metFORMIN (GLUCOPHAGE) 500 MG tablet Take 1 tablet by mouth daily (with breakfast)      hydrochlorothiazide (HYDRODIURIL) 12.5 MG tablet Take 1 tablet by mouth as needed Indications: Dr Oro      metoprolol succinate ER (TOPROL XL) 25 MG XL tablet Take 1 tablet by mouth daily (Patient taking differently: Take 1 tablet by mouth 2 times daily) 30 tablet 3    lisinopril (PRINIVIL;ZESTRIL) 20 MG tablet Take 1 tablet by mouth 2 times daily 180 tablet 0    aspirin 81 MG tablet Take 1 tablet by mouth daily (Patient not taking: Reported on 1/24/2025) 90 tablet 1     No current facility-administered medications for this visit.     Past Medical History:   Diagnosis Date    Allergic rhinitis     Allergy     Anxiety     Arthritis     Asthma     Bipolar disorder (HCC)     Bronchitis 2006    Cancer (HCC) 1992    cervical    Depression     Diabetes mellitus (HCC)     History of bronchitis     History of kidney stones     Hyperlipidemia     Hypertension     Ligament tear     left foot    Osteoarthritis     Osteoporosis     Psoriasis     Seizure (HCC)       Past Surgical History:   Procedure Laterality Date    APPENDECTOMY  1983    BACK INJECTION Left 03/15/2022    left Si injection #1 performed by Ricci Kat DO at [facility] [place] [facility]    BACK INJECTION Left 06/09/2022    Left Si injection #2 performed by Jules Rios MD at [facility]    BONY PELVIS SURGERY  2011    Screws removed-[facility]    BREAST BIOPSY  08/06/2012    Bilateral Breast biopsies x2    BREAST LUMPECTOMY  10/25/2010

## 2025-01-24 ENCOUNTER — OFFICE VISIT (OUTPATIENT)
Dept: ENT CLINIC | Age: 50
End: 2025-01-24

## 2025-01-24 VITALS
WEIGHT: 186.6 LBS | OXYGEN SATURATION: 98 % | BODY MASS INDEX: 29.99 KG/M2 | SYSTOLIC BLOOD PRESSURE: 118 MMHG | HEART RATE: 119 BPM | RESPIRATION RATE: 18 BRPM | HEIGHT: 66 IN | TEMPERATURE: 98.4 F | DIASTOLIC BLOOD PRESSURE: 84 MMHG

## 2025-01-24 DIAGNOSIS — Z98.890 HX OF SINUS SURGERY: ICD-10-CM

## 2025-01-24 DIAGNOSIS — R04.0 RECURRENT EPISTAXIS: ICD-10-CM

## 2025-01-24 DIAGNOSIS — Z98.890 HX OF NASAL SEPTOPLASTY: Primary | ICD-10-CM

## 2025-01-24 NOTE — PATIENT INSTRUCTIONS
- Use Afrin 2 sprays twice a day for the next 3 days, After this transition to nasal saline mist twice a day. The packing in your nose will dissolve on its own.   - If you have a nose bleed please use Afrin 2-3 sprays in the bleeding nose and use the nose clamp for 15-20 minutes. You can repeat this up to 3 times. If your nose bleed persists you should be seen in the ER.  - Avoid blowing and picking nose, sneezing with the mouth open     Management of epistaxis as described below:  -Afrin 2-3 sprays to bleeding nostril TID for 3 days. Afterwards use nasal saline mist spray 2-3 times a day. Discussed risks of rebound congestion/rhinitis medicamentosa from prolonged use  Apply nasal saline/ointment in the nose at least twice daily to keep mucosa moist. If using ointment, apply to lateral wall of the nose and lightly pinch the nostrils together to spread the ointment to avoid recurrent trauma to the septum  Reviewed management of acute epistaxis at home with Afrin and direct pressure (fingers or clamp)  -Humidifier use nightly    -Follow up PRN for recurrent epistaxis or other ENT concerns

## 2025-01-29 NOTE — PROGRESS NOTES
[facility] [place] [facility]  [facility], NOSE AND TH[facility]  [address]  [address]  [facility] [place] [address]  Dept: [phone]  Dept Fax: [phone]  Loc: [phone]    Carlota Zhou is a 49 y.o. female who was referred byNo ref. provider found for:  Chief Complaint   Patient presents with    Post-Op Check     Patient is here for post op 01/06 smr, sowmya, ethmoid, max, sphenoid. Patient states that she was good until yesterday. She said that blood started to pour out of her nose for a long time. She went to ER yesterday and afterwards she had a little blood. Patient is doing saline rinses once a day. Patient c/o pain/pressure in L ear   .    HPI:     Carlota Zhou is a 49 y.o. female who presents today for follow-up on surgery a couple of weeks ago.  The patient has some nasal congestion.  The pain medication is adequate.  They stopped using their Afrin and they are performing the buffered saline nasal irrigations.    Nasal airway is improved.  Facial pressure is essentially gone especially after today's debridement.    History:     Allergies   Allergen Reactions    Cinnamon Other (See Comments)     Migraine. Lip Swelling. Throat Itching.    Other Reaction(s): Unknown    Peach [Prunus Persica] Itching and Swelling    Codeine Hives and Itching     Other Reaction(s): Unknown, Unknown by Patient    Peach Flavoring Agent (Non-Screening) Itching and Swelling    Flavoring Agent      Other Reaction(s): Unknown    Acetaminophen      Other Reaction(s): Unknown by Patient    Griseofulvin Ultramicrosize [Griseofulvin] Itching    Lamotrigine Itching and Other (See Comments)     Other Reaction(s): Lamictal, sleepy, sleepy, sleepy, Unknown by Patient    Promethazine Hcl      Other Reaction(s): Unknown, Unknown by Patient    Tramadol Itching, Other (See Comments), Hallucinations and Rash     Gets aggressive    Other Reaction(s): dizzy, faint, nauea, itch, dizzy, faint, nauea, itch, dizzy, faint, 
FASCIOTOMY WITH TOPAZ, GASTROCNEMIS LENGTHING  ALL LEFT FOOT performed by Juan Ewing DPM at [facility]    OTHER SURGICAL HISTORY  07/2011    had rods and screws removed from leg and hip-[facility]    ROTATOR CUFF REPAIR Left 05/2022    SINUS ENDOSCOPY N/A 1/6/2025    Partial Submucosal Resection of Right and Left Inferior Nasal Turbinates, Partial Resection of Leela Bullosa of Left Middle Nasal Turbinate, Image Guided Nasal Endoscopy with bialteral maxillary antrostomy with removal of tissue from maxillary sinus ( missed maxillary ostium) Left Sphenoidotomy performed by Rodrigo Nixon MD at [facility] OR    SINUS SURGERY  03/16/2011    septoplasty    TUBAL LIGATION  2000    UPPER GASTROINTESTINAL ENDOSCOPY      2013     Family History   Problem Relation Age of Onset    Arthritis Mother     Diabetes Mother     Allergies Mother     Hypertension Mother     Asthma Father     Allergies Father     Diabetes Maternal Grandmother     Hypertension Maternal Grandmother     Diabetes Paternal Grandmother     Bone Cancer Maternal Aunt 40    Colon Cancer Neg Hx     Colon Polyps Neg Hx     Inflam Bowel Dis Neg Hx      Social History     Tobacco Use    Smoking status: Never     Passive exposure: Never    Smokeless tobacco: Never   Substance Use Topics    Alcohol use: No       Subjective:      Review of Systems   Constitutional:  Negative for activity change, appetite change, chills, diaphoresis, fatigue, fever and unexpected weight change.   HENT:  Positive for ear pain, sinus pressure and sinus pain. Negative for congestion, dental problem, ear discharge, facial swelling, hearing loss, mouth sores, nosebleeds, postnasal drip, rhinorrhea, sneezing, sore throat, tinnitus, trouble swallowing and voice change.    Eyes:  Negative for visual disturbance.   Respiratory:  Negative for apnea, cough, choking, chest tightness, shortness of breath, wheezing and stridor.    Cardiovascular:  Negative for chest pain, palpitations and leg

## 2025-02-04 ENCOUNTER — TELEPHONE (OUTPATIENT)
Dept: ENT CLINIC | Age: 50
End: 2025-02-04

## 2025-02-04 NOTE — TELEPHONE ENCOUNTER
Dark old blood is normal especially as nasopore dissolves and if patient is doing saline rinses.    Please ask patient if she is doing post operative saline rinses and when the nose bleeds how long does it last?  She had nasopore placed a few weeks ago with Kimberly to the left side which should have assisted with healing this area.    Originally scheduled for next post op appt today.     If persistent bleeding I can see at 2:40 today. Otherwise needs rescheduled with Dr. Nixon.

## 2025-02-04 NOTE — TELEPHONE ENCOUNTER
Anticipated to have dark red coagulum with saline rinses and after laying supine with sleeping.    Noted.

## 2025-02-04 NOTE — TELEPHONE ENCOUNTER
Patient called in stating that she is still having a lot of bleeding including clots. Mostly in the morning she spits out a dark bloody color. Has pressure on top of head right above eyes. Patient had surgery on 01/06/25. I let patient know I would be sending this to the provider on call and would call her back as soon as we can. Patient verbalized understanding and thanked me.

## 2025-02-04 NOTE — TELEPHONE ENCOUNTER
Called patient and she states hat the dark red blood is usually in the morning and  after her saline rinses. She usually bleeds for about 5 minutes. Also let her know that she could still be seen today, she said she doesn't feel she needs an appointment at the moment but if it starts to worsen with in the next hour or two she would call us to get in for the 2:40 appointment that was offered. Patient verbalized understanding and thanked me.

## 2025-02-13 ENCOUNTER — OFFICE VISIT (OUTPATIENT)
Dept: ENT CLINIC | Age: 50
End: 2025-02-13

## 2025-02-13 VITALS
HEIGHT: 66 IN | WEIGHT: 187 LBS | SYSTOLIC BLOOD PRESSURE: 118 MMHG | HEART RATE: 89 BPM | BODY MASS INDEX: 30.05 KG/M2 | DIASTOLIC BLOOD PRESSURE: 70 MMHG | TEMPERATURE: 98.3 F | OXYGEN SATURATION: 99 %

## 2025-02-13 DIAGNOSIS — Z98.890 HX OF NASAL SEPTOPLASTY: Primary | ICD-10-CM

## 2025-02-13 DIAGNOSIS — Z98.890 HX OF SINUS SURGERY: ICD-10-CM

## 2025-02-13 PROCEDURE — 99024 POSTOP FOLLOW-UP VISIT: CPT | Performed by: OTOLARYNGOLOGY

## 2025-02-19 NOTE — PROGRESS NOTES
[facility] [place] [facility]  [facility]SE [facility] TH[facility]  [address]  [address]  [facility] [address]  Dept: [phone]  Dept Fax: [phone]  Loc: [phone]    Carlota Zhou is a 49 y.o. female who was referred byNo ref. provider found for:  Chief Complaint   Patient presents with    Post-Op Check     Patient here for a post op. Patient states she has had small amounts of bleeding.    .    HPI:     Carlota Zhou is a 49 y.o. female who presents today for follow-up on nasal surgery a week ago. The patient has some nasal congestion.  The pain medication is adequate.  They stopped using their Afrin and they are performing the buffered saline nasal irrigations. Nasal airway is improved.  Facial pressure is improving.    History:     Allergies   Allergen Reactions    Cinnamon Other (See Comments)     Migraine. Lip Swelling. Throat Itching.    Other Reaction(s): Unknown    Peach [Prunus Persica] Itching and Swelling    Codeine Hives and Itching     Other Reaction(s): Unknown, Unknown by Patient    Peach Flavoring Agent (Non-Screening) Itching and Swelling    Flavoring Agent      Other Reaction(s): Unknown    Acetaminophen      Other Reaction(s): Unknown by Patient    Griseofulvin Ultramicrosize [Griseofulvin] Itching    Lamotrigine Itching and Other (See Comments)     Other Reaction(s): Lamictal, sleepy, sleepy, sleepy, Unknown by Patient    Promethazine Hcl      Other Reaction(s): Unknown, Unknown by Patient    Tramadol Itching, Other (See Comments), Hallucinations and Rash     Gets aggressive    Other Reaction(s): dizzy, faint, nauea, itch, dizzy, faint, nauea, itch, dizzy, faint, nauea, itch, tramadol, Unknown, Unknown by Patient     Current Outpatient Medications   Medication Sig Dispense Refill    predniSONE (DELTASONE) 20 MG tablet One tablet every morning for three days, then one-half tablet for two days. Start the day AFTER surgery. 4 tablet 0    famotidine (PEPCID) 20 MG

## (undated) DEVICE — BLADE 1882040HR 5PK M4 INF TURB 2MM ROT: Brand: STRAIGHTSHOT

## (undated) DEVICE — PACK PROCEDURE SURG POD SC SRHP LF

## (undated) DEVICE — 3 ML SYRINGE LUER-LOCK TIP: Brand: MONOJECT

## (undated) DEVICE — TOWEL,OR,DSP,ST,BLUE,STD,4/PK,20PK/CS: Brand: MEDLINE

## (undated) DEVICE — TAPE CAST W3INXL4YD WHT FBRGLS POLYUR RESIN LO TACK RIG

## (undated) DEVICE — SOLUTION IV 1000ML 0.9% SOD CHL PH 5 INJ USP VIAFLX PLAS

## (undated) DEVICE — TUBING, SUCTION, 1/4" X 12', STRAIGHT: Brand: MEDLINE

## (undated) DEVICE — ENT: Brand: MEDLINE INDUSTRIES, INC.

## (undated) DEVICE — GLOVE SURG SZ 65 THK91MIL LTX FREE SYN POLYISOPRENE

## (undated) DEVICE — SUTURE PLN GUT SZ 4-0 L18IN ABSRB YELLOWISH TAN L13MM SC-1 1828H

## (undated) DEVICE — IMPREGNATED GAUZE DRESSING: Brand: CUTICERIN 7.5X7.5CM CTN 50

## (undated) DEVICE — GAUZE SPONGES,USP TYPE VII GAUZE, 12 PLY: Brand: CURITY

## (undated) DEVICE — LOOP VES W25MM THK1MM MAXI RED SIL FLD REPELLENT 100 PER

## (undated) DEVICE — GLOVE ORANGE PI 7 1/2   MSG9075

## (undated) DEVICE — INSTRUMENT TRACKER 9733533XOM ENT 1PK

## (undated) DEVICE — SPONGE,NEURO,0.5"X3",XR,STRL,LF,10/PK: Brand: MEDLINE

## (undated) DEVICE — NEEDLE SPNL L3.5IN DIA25GA QNCKE TYP BVL SPINOCAN

## (undated) DEVICE — PADDING UNDERCAST W4INXL12FT RAYON POLY SYN NONADHESIVE

## (undated) DEVICE — SOLUTION IV 500ML 0.45% SOD CHL PH 5 INJ USP VIAFLX PLAS

## (undated) DEVICE — BLADE 1884080EM TRICUT 4MMX13CM M4 ROHS: Brand: FUSION®

## (undated) DEVICE — GLOVE ORANGE PI 7   MSG9070

## (undated) DEVICE — TOPAZ MICRODEBRIDER ICW: Brand: COBLATION, TOPAZ

## (undated) DEVICE — NEEDLE SPNL 22GA L3.5IN BLK HUB S STL REG WALL FIT STYL W/

## (undated) DEVICE — DRESSING TRNSPAR W5XL4.5IN FLM SHT SEMIPERMEABLE WIND

## (undated) DEVICE — GLOVE ORANGE PI 8   MSG9080

## (undated) DEVICE — GLOVE SURG SZ 8 L11.77IN FNGR THK9.8MIL STRW LTX POLYMER

## (undated) DEVICE — CHLORAPREP 26ML CLEAR

## (undated) DEVICE — DRESSING TRNSPAR W2XL2.75IN FLM SHT SEMIPERMEABLE WIND

## (undated) DEVICE — NEEDLE SYR 18GA L1.5IN RED PLAS HUB S STL BLNT FILL W/O

## (undated) DEVICE — 6 ML SYRINGE LUER-LOCK TIP: Brand: MONOJECT

## (undated) DEVICE — TAPE CAST W3INXL4YD DP RED FBRGLS RESIN LO TACK RIG SUPP

## (undated) DEVICE — HYPODERMIC SAFETY NEEDLE: Brand: MAGELLAN

## (undated) DEVICE — YANKAUER,BULB TIP,W/O VENT,RIGID,STERILE: Brand: MEDLINE

## (undated) DEVICE — PACK-MAJOR

## (undated) DEVICE — TRAY PREP DRY W/ PREM GLV 2 APPL 6 SPNG 2 UNDPD 1 OVERWRAP

## (undated) DEVICE — TUBING 1895522 5PK STRAIGHTSHOT TO XPS: Brand: STRAIGHTSHOT®

## (undated) DEVICE — GOWN,SIRUS,NON REINFRCD,LARGE,SET IN SL: Brand: MEDLINE

## (undated) DEVICE — SHEET,DRAPE,3/4,53X77,STERILE: Brand: MEDLINE

## (undated) DEVICE — Device

## (undated) DEVICE — THREADED GUIDE WIRE

## (undated) DEVICE — SYRINGE MED 10ML LUERLOCK TIP W/O SFTY DISP

## (undated) DEVICE — PATIENT TRACKER 9734887XOM NON-INVASIVE

## (undated) DEVICE — TAPE CAST W4INXL4YD WHT FBRGLS POLYUR RESIN LO TACK RIG

## (undated) DEVICE — NEEDLE HYPO 27GA L15IN REG BVL W O SFTY FOR SYR DISPOSABLE